# Patient Record
Sex: FEMALE | Race: WHITE | NOT HISPANIC OR LATINO | Employment: OTHER | URBAN - METROPOLITAN AREA
[De-identification: names, ages, dates, MRNs, and addresses within clinical notes are randomized per-mention and may not be internally consistent; named-entity substitution may affect disease eponyms.]

---

## 2017-03-04 ENCOUNTER — HOSPITAL ENCOUNTER (EMERGENCY)
Facility: HOSPITAL | Age: 72
Discharge: HOME | End: 2017-03-04
Attending: EMERGENCY MEDICINE | Admitting: EMERGENCY MEDICINE

## 2017-03-04 VITALS
OXYGEN SATURATION: 100 % | HEART RATE: 69 BPM | BODY MASS INDEX: 27.97 KG/M2 | RESPIRATION RATE: 18 BRPM | WEIGHT: 152 LBS | TEMPERATURE: 97.8 F | SYSTOLIC BLOOD PRESSURE: 152 MMHG | DIASTOLIC BLOOD PRESSURE: 105 MMHG | HEIGHT: 62 IN

## 2017-03-04 DIAGNOSIS — R04.0 EPISTAXIS: Primary | ICD-10-CM

## 2017-03-04 PROCEDURE — 30901 CONTROL OF NOSEBLEED: CPT

## 2017-03-04 PROCEDURE — 99283 EMERGENCY DEPT VISIT LOW MDM: CPT

## 2017-03-04 RX ADMIN — SILVER NITRATE APPLICATORS 1 APPLICATION: 25; 75 STICK TOPICAL at 17:42

## 2017-03-04 NOTE — ED PROVIDER NOTES
I supervised care provided by the midlevel provider.    We have discussed this patient's history, physical exam, and treatment plan.   I have reviewed the note and personally saw and examined the patient and agree with the plan of care.    Pt presents to the ED with left-sided epistaxis onset at about 15:00 today. Pt is not on any antiplatelets or anticoagulants. On physical exam, pt has an area on the left nare that has been cauterized. Pt has no active bleeding from the left nare at this time. Pt advised to apply pressure with clamp provided in the ER if epistaxis recurs. Pt will be provided f/u referral for ENT.        Documentation assistance provided by Malcom Flores. Information recorded by the scribe was done at my direction and has been verified and validated by me.     Entered by Malcom Flores, acting as scribe for Dr. Guzman MD.        Malcom Flores  03/04/17 1800       Ted Hinds MD  03/04/17 6947
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
## 2017-03-07 ENCOUNTER — TELEPHONE (OUTPATIENT)
Dept: SOCIAL WORK | Facility: HOSPITAL | Age: 72
End: 2017-03-07

## 2017-09-10 ENCOUNTER — HOSPITAL ENCOUNTER (EMERGENCY)
Facility: HOSPITAL | Age: 72
Discharge: HOME | End: 2017-09-10
Attending: EMERGENCY MEDICINE | Admitting: EMERGENCY MEDICINE

## 2017-09-10 VITALS
RESPIRATION RATE: 16 BRPM | HEIGHT: 62 IN | TEMPERATURE: 98 F | HEART RATE: 55 BPM | WEIGHT: 149 LBS | SYSTOLIC BLOOD PRESSURE: 137 MMHG | BODY MASS INDEX: 27.42 KG/M2 | DIASTOLIC BLOOD PRESSURE: 92 MMHG | OXYGEN SATURATION: 97 %

## 2017-09-10 DIAGNOSIS — S61.219A LACERATION OF FINGER, INITIAL ENCOUNTER: Primary | ICD-10-CM

## 2017-09-10 PROCEDURE — 90471 IMMUNIZATION ADMIN: CPT

## 2017-09-10 PROCEDURE — 25010000002 TETANUS-DIPHTHERIA TOXOIDS (ADULT) PER 0.5 ML

## 2017-09-10 PROCEDURE — 90714 TD VACC NO PRESV 7 YRS+ IM: CPT

## 2017-09-10 PROCEDURE — 99282 EMERGENCY DEPT VISIT SF MDM: CPT

## 2017-09-10 RX ORDER — CIPROFLOXACIN 250 MG/1
250 TABLET, FILM COATED ORAL 2 TIMES DAILY
Qty: 10 TABLET | Refills: 0 | Status: SHIPPED | OUTPATIENT
Start: 2017-09-10 | End: 2017-09-30

## 2017-09-10 RX ORDER — BUPIVACAINE HYDROCHLORIDE 5 MG/ML
10 INJECTION, SOLUTION EPIDURAL; INTRACAUDAL ONCE
Status: COMPLETED | OUTPATIENT
Start: 2017-09-10 | End: 2017-09-10

## 2017-09-10 RX ADMIN — BUPIVACAINE HYDROCHLORIDE 10 ML: 5 INJECTION, SOLUTION EPIDURAL; INTRACAUDAL at 09:38

## 2017-09-10 RX ADMIN — CLOSTRIDIUM TETANI TOXOID ANTIGEN (FORMALDEHYDE INACTIVATED) AND CORYNEBACTERIUM DIPHTHERIAE TOXOID ANTIGEN (FORMALDEHYDE INACTIVATED) 0.5 ML: 5; 2 INJECTION, SUSPENSION INTRAMUSCULAR at 11:33

## 2017-09-10 NOTE — ED NOTES
Applied tube antibiotic ointment, adaptic and tube gauze to left pointer finger.     Yumiko Alberto  09/10/17 2029

## 2017-09-10 NOTE — ED PROVIDER NOTES
EMERGENCY DEPARTMENT ENCOUNTER    CHIEF COMPLAINT  Chief Complaint: finger laceration  History given by: patient  History limited by: N/A  Room Number: 35/35  PMD: Raymundo Cardona MD      HPI:  Pt states that yesterday, at about 1800, she accidentally lacerated left 2nd finger on knife while she was slicing bread. She denies sensory loss, motor loss, fevers, and sustaining any other injury. After she applied pressure to the laceration site, the bleeding improved. She is not on any blood thinners. Tetanus status is unknown. Pt has no other complaints at this time.     Location: left 2nd finger  Radiation: N/A  Quality: none  Intensity/Severity: mild  Duration: occurred yesterday at about 1800  Onset quality: abrupt  Timing: constant  Progression: unchanged  Aggravating Factors: none  Alleviating Factors: applying pressure to control the bleeding  Previous Episodes: none mentioned  Treatment before arrival: After pt applied pressure to the laceration site, the bleeding improved.   Associated Symptoms: none       PAST MEDICAL HISTORY  Active Ambulatory Problems     Diagnosis Date Noted   • Acute bronchitis 03/18/2016   • Nonfamilial nocturnal leg cramps 04/18/2016   • Atypical migraine 09/26/2013   • Vitamin D deficiency 11/25/2014   • Cervicogenic headache 08/05/2014   • Essential hypertension 11/25/2014   • Gastroesophageal reflux disease 01/03/2014   • Cervico-occipital neuralgia 08/05/2014   • Congenital pes planus 01/15/2015     Resolved Ambulatory Problems     Diagnosis Date Noted   • Lumbar spinal stenosis 12/16/2016     Past Medical History:   Diagnosis Date   • Altered bowel elimination due to intestinal ostomy    • Anemia    • Asthma    • Extremity pain    • Heart murmur    • Herpes simplex    • History of transfusion    • Hypertension    • Low back pain    • Lumbosacral disc disease    • Migraine    • Migraine    • Spinal stenosis, lumbar    • Ulcerative colitis          PAST SURGICAL HISTORY  Past  Surgical History:   Procedure Laterality Date   • BUNIONECTOMY Right    • COLONOSCOPY     • CONTINENT CECOSTOMY  1980    LARGE INTESTINE REMOVED   • LUMBAR DISCECTOMY FUSION INSTRUMENTATION N/A 12/16/2016    Procedure: MICROLAMINECTOMY L3 4  L4 5 W/ METRIX;  Surgeon: Valdez Greene DO;  Location: [facility] MAIN OR;  Service:    • VASCULAR SURGERY Right     REPAIRED VEIN RIGHT LOWER EXTREMITY REPAIRED VALVE GROIN AREA   • WISDOM TOOTH EXTRACTION      FOUR         FAMILY HISTORY  Family History   Problem Relation Age of Onset   • Thyroid disease Mother    • Cancer Mother    • Hyperlipidemia Mother    • Alcohol abuse Father    • Cancer Sister    • Diabetes Sister    • Hypertension Brother    • Diabetes Brother    • Diabetes Brother    • Heart disease Paternal Grandmother    • Aortic aneurysm Other          SOCIAL HISTORY  Social History     Social History   • Marital status:      Spouse name: N/A   • Number of children: N/A   • Years of education: N/A     Occupational History   • Not on file.     Social History Main Topics   • Smoking status: Never Smoker   • Smokeless tobacco: Not on file   • Alcohol use 1.2 oz/week     2 Glasses of wine per week   • Drug use: No   • Sexual activity: Defer     Other Topics Concern   • Not on file     Social History Narrative   • No narrative on file         ALLERGIES  Codeine; Other; Latex; Nickel; and Penicillins        REVIEW OF SYSTEMS  Review of Systems   Constitutional: Negative for fever.   Respiratory: Negative for shortness of breath.    Cardiovascular: Negative for chest pain.   Skin: Positive for wound (left 2nd finger laceration).   Neurological: Negative for weakness and numbness.   All other systems reviewed and are negative.           PHYSICAL EXAM  ED Triage Vitals   Temp Heart Rate Resp BP SpO2   09/10/17 0904 09/10/17 0904 09/10/17 0904 09/10/17 0904 09/10/17 0904   98 °F (36.7 °C) 79 16 146/92 97 % WNL      Temp src Heart Rate Source Patient Position BP Location  FiO2 (%)   09/10/17 0904 09/10/17 0904 09/10/17 0904 09/10/17 0904 --   Tympanic Monitor Standing Left arm        Physical Exam   Constitutional: She is oriented to person, place, and time. No distress.   HENT:   Head: Normocephalic.   Neck: Normal range of motion. Neck supple.   Cardiovascular: Normal rate, regular rhythm and normal heart sounds.    Pulmonary/Chest: Effort normal and breath sounds normal. No respiratory distress.   Musculoskeletal:   flexion and extension intact to left 2nd finger, NV intact distally to LUE   Neurological: She is alert and oriented to person, place, and time. She has normal motor skills and normal sensation.   Sensation and motor function intact to left 2nd finger   Skin: Skin is warm and dry. Laceration (bevel laceration to the radial aspect of the tip of the left 2nd finger (no nail involvement)) noted.   Nursing note and vitals reviewed.          PROCEDURES  Laceration Repair  Date/Time: 9/10/2017 10:30 AM  Performed by: ESTHELA YOO  Authorized by: BREN MURPHY   Consent: Verbal consent obtained.  Risks and benefits: risks, benefits and alternatives were discussed  Consent given by: patient  Patient understanding: patient states understanding of the procedure being performed  Patient consent: the patient's understanding of the procedure matches consent given  Required items: required blood products, implants, devices, and special equipment available  Patient identity confirmed: verbally with patient and arm band  Body area: upper extremity  Location details: left index finger  Laceration length: 1 cm  Contaminated: none.  Foreign bodies: no foreign bodies  Tendon involvement: none  Nerve involvement: none  Vascular damage: no  Anesthesia: local infiltration    Anesthesia:  Local Anesthetic: bupivacaine 0.5% without epinephrine  Anesthetic total: 4 mL    Sedation:  Patient sedated: no  Preparation: Patient was prepped and draped in the usual sterile fashion.  Irrigation  solution: saline  Irrigation method: syringe  Amount of cleaning: extensive  Debridement: minimal  Degree of undermining: none  Skin closure: 6-0 nylon  Number of sutures: 5  Technique: simple (interrupted)  Approximation: close  Approximation difficulty: simple  Dressing: antibiotic ointment and tube gauze  Patient tolerance: Patient tolerated the procedure well with no immediate complications          PROGRESS AND CONSULTS  ED Course     9:38 AM- Although pt sustained laceration yesterday at about 1800, we will clean the wound extensively, perform delayed laceration repair, and prescribe abx to cover for potential infection.     10:30 AM- Performed laceration repair. See procedure note for further details.     10:46 AM- Discussed with pt about dx and plan for discharge. Informed pt of plan to update tetanus status and to prescribe abx to cover for infection. Instructed pt to keep laceration dry for 24 hours, then to clean site daily with soap and water, to apply antibiotic ointment, to monitor for signs of infection (including redness, drainage, swelling, and fevers), and to have sutures removed in 10 days. Instructed to f/u with PMD for recheck and for suture removal. RTER warnings given. Pt understands and agrees with plan. Addressed all questions.    10:48 AM- Tetanus status is unknown. Ordered tetanus diphtheria toxoids.    11:03 AM- Discussed case with Dr Vazquez who agrees with the plan of care.       MEDICAL DECISION MAKING    MDM  Number of Diagnoses or Management Options  Patient Progress  Patient progress: stable       DIAGNOSIS  Final diagnoses:   Laceration of finger, initial encounter         DISPOSITION  Discharged    DISCHARGE    Patient discharged in stable condition.    Reviewed implications of diagnosis, meds, responsibility to follow up, warning signs and symptoms of possible worsening, potential complications and reasons to return to ER.    Patient voiced understanding of above  instructions.    Discussed plan for discharge, as there is no emergent indication for admission.  Pt is agreeable and understands need for follow up and repeat testing.  Pt is aware that discharge does not mean that nothing is wrong but it indicates no emergency is present and they must continue care with follow-up as given below or physician of their choice.     FOLLOW-UP  Raymundo Cardona MD  [address] [facility] [address]  [phone]            DISCHARGE MEDICATIONS     Medication List      New Prescriptions          ciprofloxacin 250 MG tablet   Commonly known as:  CIPRO   Take 1 tablet by mouth 2 (Two) Times a Day.         Stop          sulfamethoxazole-trimethoprim 800-160 MG per tablet   Commonly known as:  BACTRIM DS,SEPTRA DS             Latest Documented Vital Signs:  As of 10:53 AM  BP- 146/92 HR- 79 Temp- 98 °F (36.7 °C) (Tympanic) O2 sat- 97%        --  Documentation assistance provided by edi Flores for BRANDON Johnson.  Information recorded by the scribe was done at my direction and has been verified and validated by me.     Leta Flores  09/10/17 1100       FAHEEM Cadet  09/10/17 1206

## 2017-09-10 NOTE — DISCHARGE INSTRUCTIONS
Leave tube gauze on 24 hours  Then clean with mild soap/water or 1/2 strength peroxide  Sutures out 10 days PMD

## 2017-09-10 NOTE — ED PROVIDER NOTES
Pt is a 72 y.o. female who presents c/o a laceration to her L index finger sustained yesterday while she was slicing bread. Pt denies sensory or motor changes and has no other complaints at this time.     PE:  Resting comfortably, no distress  Vitals stable  Sutured laceration to the distal L index finger    Plan to discharge.     I supervised care provided by the midlevel provider FAHEEM Chirinos.    We have discussed this patient's history, physical exam, and treatment plan.   I have reviewed the note and personally saw and examined the patient and agree with the plan of care.    Documentation assistance provided by edi Gray for Dr. Vazquez.  Information recorded by the scribe was done at my direction and has been verified and validated by me.       Unique Gray  09/10/17 8095       Tex Vazquez MD  09/10/17 7841

## 2017-09-13 ENCOUNTER — TELEPHONE (OUTPATIENT)
Dept: SOCIAL WORK | Facility: HOSPITAL | Age: 72
End: 2017-09-13

## 2017-09-13 ENCOUNTER — PATIENT OUTREACH (OUTPATIENT)
Dept: CASE MANAGEMENT | Facility: OTHER | Age: 72
End: 2017-09-13

## 2017-10-05 ENCOUNTER — OFFICE VISIT (OUTPATIENT)
Dept: INTERNAL MEDICINE | Facility: CLINIC | Age: 72
End: 2017-10-05

## 2017-10-05 VITALS
BODY MASS INDEX: 27.6 KG/M2 | DIASTOLIC BLOOD PRESSURE: 82 MMHG | OXYGEN SATURATION: 97 % | HEART RATE: 67 BPM | TEMPERATURE: 97.4 F | SYSTOLIC BLOOD PRESSURE: 128 MMHG | HEIGHT: 62 IN | WEIGHT: 150 LBS

## 2017-10-05 DIAGNOSIS — Z23 FLU VACCINE NEED: Primary | ICD-10-CM

## 2017-10-05 DIAGNOSIS — Q66.50 CONGENITAL PES PLANUS, UNSPECIFIED LATERALITY: ICD-10-CM

## 2017-10-05 DIAGNOSIS — K90.89 OTHER SPECIFIED INTESTINAL MALABSORPTION: ICD-10-CM

## 2017-10-05 DIAGNOSIS — E55.9 VITAMIN D DEFICIENCY: ICD-10-CM

## 2017-10-05 DIAGNOSIS — I10 ESSENTIAL HYPERTENSION: ICD-10-CM

## 2017-10-05 DIAGNOSIS — Z00.00 MEDICARE ANNUAL WELLNESS VISIT, INITIAL: ICD-10-CM

## 2017-10-05 DIAGNOSIS — Z23 NEED FOR PNEUMOCOCCAL VACCINE: ICD-10-CM

## 2017-10-05 DIAGNOSIS — K21.9 GASTROESOPHAGEAL REFLUX DISEASE WITHOUT ESOPHAGITIS: ICD-10-CM

## 2017-10-05 PROBLEM — K90.9 INTESTINAL MALABSORPTION: Status: ACTIVE | Noted: 2017-10-05

## 2017-10-05 PROCEDURE — 90662 IIV NO PRSV INCREASED AG IM: CPT | Performed by: INTERNAL MEDICINE

## 2017-10-05 PROCEDURE — 99214 OFFICE O/P EST MOD 30 MIN: CPT | Performed by: INTERNAL MEDICINE

## 2017-10-05 PROCEDURE — G0438 PPPS, INITIAL VISIT: HCPCS | Performed by: INTERNAL MEDICINE

## 2017-10-05 PROCEDURE — G0008 ADMIN INFLUENZA VIRUS VAC: HCPCS | Performed by: INTERNAL MEDICINE

## 2017-10-05 NOTE — PATIENT INSTRUCTIONS
Medicare Wellness  Personal Prevention Plan of Service     Date of Office Visit:  10/05/2017  Encounter Provider:  Christiano Narvaez MD  Place of Service:  [facility] INTERNAL MEDICINE  Patient Name: Sigrid Chen  :  1945    As part of the Medicare Wellness portion of your visit today, we are providing you with this personalized preventive plan of services (PPPS). This plan is based upon recommendations of the [place] Preventive Services Task Force (USPSTF) and the Advisory Committee on Immunization Practices (ACIP).    This lists the preventive care services that should be considered, and provides dates of when you are due. Items listed as completed are up-to-date and do not require any further intervention.    Health Maintenance   Topic Date Due   • PNEUMOCOCCAL VACCINES (65+ LOW/MEDIUM RISK) (1 of 2 - PCV13) 2010   • ZOSTER VACCINE  03/15/2016   • INFLUENZA VACCINE  2017   • TDAP/TD VACCINES (1 - Tdap) 2017   • LIPID PANEL  10/20/2017   • MEDICARE ANNUAL WELLNESS  10/05/2018   • MAMMOGRAM  2019   • HEPATITIS C SCREENING  Addressed   • COLONOSCOPY  Excluded       Orders Placed This Encounter   Procedures   • Flu Vaccine High Dose PF 65YR+       Return in about 1 year (around 10/5/2018) for Annual physical, Medicare Wellness.

## 2017-10-05 NOTE — PROGRESS NOTES
QUICK REFERENCE INFORMATION:  The ABCs of the Annual Wellness Visit    Initial Medicare Wellness Visit    HEALTH RISK ASSESSMENT    1945    Recent Hospitalizations:  No hospitalization(s) within the last year..        Current Medical Providers:  Patient Care Team:  Christiano Narvaez MD as PCP - General (Internal Medicine)  Flor Dale MD as PCP - Claims Attributed  Emilie Kramer RN as Care Coordinator (Population Health)        Smoking Status:  History   Smoking Status   • Never Smoker   Smokeless Tobacco   • Never Used       Alcohol Consumption:  History   Alcohol Use   • 1.2 oz/week   • 2 Glasses of wine per week       Depression Screen:   PHQ-2/PHQ-9 Depression Screening 10/5/2017   Little interest or pleasure in doing things 0   Feeling down, depressed, or hopeless 0   Total Score 0       Health Habits and Functional and Cognitive Screening:  Functional & Cognitive Status 10/5/2017   Do you have difficulty preparing food and eating? No   Do you have difficulty bathing yourself? No   Do you have difficulty getting dressed? No   Do you have difficulty using the toilet? No   Do you have difficulty moving around from place to place? No   In the past year have you fallen or experienced a near fall? No   Do you need help using the phone?  No   Are you deaf or do you have serious difficulty hearing?  No   Do you need help with transportation? No   Do you need help shopping? No   Do you need help preparing meals?  No   Do you need help with housework?  No   Do you need help with laundry? No   Do you need help taking your medications? No   Do you need help managing money? No   Do you have difficulty concentrating, remembering or making decisions? No       Health Habits  Current Diet: Well Balanced Diet  Dental Exam: Up to date  Eye Exam: Up to date  Exercise (times per week): 5 times per week  Current Exercise Activities Include: Cardiovasular Workout on Exercise Equipment          Does the patient have  evidence of cognitive impairment? No    Asiprin use counseling: Contraindicated from taking ASA      Recent Lab Results:    Visual Acuity:  No exam data present    Age-appropriate Screening Schedule:  Refer to the list below for future screening recommendations based on patient's age, sex and/or medical conditions. Orders for these recommended tests are listed in the plan section. The patient has been provided with a written plan.    Health Maintenance   Topic Date Due   • PNEUMOCOCCAL VACCINES (65+ LOW/MEDIUM RISK) (1 of 2 - PCV13) 03/11/2010   • ZOSTER VACCINE  03/15/2016   • INFLUENZA VACCINE  08/01/2017   • TDAP/TD VACCINES (1 - Tdap) 09/11/2017   • LIPID PANEL  10/20/2017   • MAMMOGRAM  05/25/2019   • COLONOSCOPY  Excluded        Subjective   History of Present Illness    Sigrid Chen is a 72 y.o. female who presents for an Annual Wellness Visit.    The following portions of the patient's history were reviewed and updated as appropriate: allergies, current medications, past family history, past medical history, past social history, past surgical history and problem list.    Outpatient Medications Prior to Visit   Medication Sig Dispense Refill   • diphenhydrAMINE (BENADRYL) 25 mg capsule Take 25 mg by mouth At Night As Needed.     • Ginkgo Biloba (GNP GINGKO BILOBA EXTRACT PO) Take 1 tablet by mouth Daily.     • ibuprofen (ADVIL,MOTRIN) 400 MG tablet Take 400 mg by mouth Every 6 (Six) Hours As Needed for mild pain (1-3). HOLD PRIOR TO SURGERY     • metoprolol tartrate (LOPRESSOR) 25 MG tablet TAKE 1 TABLET BY MOUTH 2 (TWO) TIMES A DAY. 180 tablet 1   • Multiple Vitamin (MULTI-VITAMIN PO) Take  by mouth. HOLD PRIOR TO SURGERY     • Potassium Gluconate 550 MG tablet Take 550 mg by mouth 2 (Two) Times a Day As Needed.     • SUMAtriptan (IMITREX) 100 MG tablet Take 100 mg by mouth 1 (One) Time As Needed.     • tiZANidine (ZANAFLEX) 2 MG tablet Take 2 mg by mouth at night as needed for muscle spasms.     •  "valACYclovir (VALTREX) 1000 MG tablet Take 1,000 mg by mouth 2 (Two) Times a Day.     • sulfamethoxazole-trimethoprim (BACTRIM DS,SEPTRA DS) 800-160 MG per tablet Take 1 tablet by mouth 2 (Two) Times a Day. 10 tablet 0   • venlafaxine (EFFEXOR) 37.5 MG tablet Take 18.75 mg by mouth 2 (Two) Times a Day. prn        No facility-administered medications prior to visit.        Patient Active Problem List   Diagnosis   • Acute bronchitis   • Nonfamilial nocturnal leg cramps   • Atypical migraine   • Vitamin D deficiency   • Cervicogenic headache   • Essential hypertension   • Gastroesophageal reflux disease   • Cervico-occipital neuralgia   • Congenital pes planus   • Medicare annual wellness visit, initial       Advance Care Planning:  has an advance directive - a copy has been provided and is in file    Identification of Risk Factors:  Risk factors include: no new recs.    Review of Systems    Compared to one year ago, the patient feels her physical health is better.  Compared to one year ago, the patient feels her mental health is better.    Objective     Physical Exam    Vitals:    10/05/17 1322   BP: 128/82   BP Location: Left arm   Patient Position: Sitting   Cuff Size: Adult   Pulse: 67   Temp: 97.4 °F (36.3 °C)   TempSrc: Oral   SpO2: 97%   Weight: 150 lb (68 kg)   Height: 62\" (157.5 cm)   PainSc: 0-No pain       Body mass index is 27.44 kg/(m^2).  Discussed the patient's BMI with her. The BMI is in the acceptable range.    Assessment/Plan   Patient Self-Management and Personalized Health Advice  The patient has been provided with information about: no new recs and preventive services including:   · no new recs.    Visit Diagnoses:    ICD-10-CM ICD-9-CM   1. Flu vaccine need Z23 V04.81   2. Medicare annual wellness visit, initial Z00.00 V70.0   3. Essential hypertension I10 401.9   4. Gastroesophageal reflux disease without esophagitis K21.9 530.81   5. Congenital pes planus, unspecified laterality Q66.50 754.61 "   6. Vitamin D deficiency E55.9 268.9       Orders Placed This Encounter   Procedures   • Flu Vaccine High Dose PF 65YR+       Outpatient Encounter Prescriptions as of 10/5/2017   Medication Sig Dispense Refill   • diphenhydrAMINE (BENADRYL) 25 mg capsule Take 25 mg by mouth At Night As Needed.     • Ginkgo Biloba (GNP GINGKO BILOBA EXTRACT PO) Take 1 tablet by mouth Daily.     • ibuprofen (ADVIL,MOTRIN) 400 MG tablet Take 400 mg by mouth Every 6 (Six) Hours As Needed for mild pain (1-3). HOLD PRIOR TO SURGERY     • metoprolol tartrate (LOPRESSOR) 25 MG tablet TAKE 1 TABLET BY MOUTH 2 (TWO) TIMES A DAY. 180 tablet 1   • Multiple Vitamin (MULTI-VITAMIN PO) Take  by mouth. HOLD PRIOR TO SURGERY     • Potassium Gluconate 550 MG tablet Take 550 mg by mouth 2 (Two) Times a Day As Needed.     • SUMAtriptan (IMITREX) 100 MG tablet Take 100 mg by mouth 1 (One) Time As Needed.     • tiZANidine (ZANAFLEX) 2 MG tablet Take 2 mg by mouth at night as needed for muscle spasms.     • valACYclovir (VALTREX) 1000 MG tablet Take 1,000 mg by mouth 2 (Two) Times a Day.     • [DISCONTINUED] sulfamethoxazole-trimethoprim (BACTRIM DS,SEPTRA DS) 800-160 MG per tablet Take 1 tablet by mouth 2 (Two) Times a Day. 10 tablet 0   • [DISCONTINUED] venlafaxine (EFFEXOR) 37.5 MG tablet Take 18.75 mg by mouth 2 (Two) Times a Day. prn        No facility-administered encounter medications on file as of 10/5/2017.        Reviewed use of high risk medication in the elderly: yes  Reviewed for potential of harmful drug interactions in the elderly: yes    Follow Up:  No Follow-up on file.     An After Visit Summary and PPPS with all of these plans were given to the patient.

## 2017-10-06 LAB
25(OH)D3+25(OH)D2 SERPL-MCNC: 28.6 NG/ML (ref 30–100)
APPEARANCE UR: CLEAR
BACTERIA #/AREA URNS HPF: NORMAL /HPF
BILIRUB UR QL STRIP: NEGATIVE
CASTS URNS MICRO: NORMAL
COLOR UR: YELLOW
EPI CELLS #/AREA URNS HPF: NORMAL /HPF
GLUCOSE UR QL: NEGATIVE
HGB UR QL STRIP: NEGATIVE
KETONES UR QL STRIP: NEGATIVE
LEUKOCYTE ESTERASE UR QL STRIP: NEGATIVE
NITRITE UR QL STRIP: NEGATIVE
PH UR STRIP: 6 [PH] (ref 5–8)
PROT UR QL STRIP: NEGATIVE
RBC #/AREA URNS HPF: NORMAL /HPF
SP GR UR: 1.02 (ref 1–1.03)
T4 FREE SERPL-MCNC: 1.01 NG/DL (ref 0.93–1.7)
TSH SERPL DL<=0.005 MIU/L-ACNC: 2.16 MIU/ML (ref 0.27–4.2)
UROBILINOGEN UR STRIP-MCNC: (no result) MG/DL
VIT B12 SERPL-MCNC: 295 PG/ML (ref 211–946)
WBC #/AREA URNS HPF: NORMAL /HPF

## 2017-10-27 NOTE — PROGRESS NOTES
Subjective   Sigrid Chen is a 72 y.o. female.   She is here today for Medicare annual wellness visit initial along with hypertension GERD congenital pes planus vitamin D deficiency and intestinal malabsorption  History of Present Illness   She is here today for Medicare annual wellness visit initial along with hypertension GERD congenital pes planus vitamin D deficiency and intestinal malabsorption and she has no new complaints and she is on medication for and vitamin D deficiency as well as hypertension GERD and joint aches  The following portions of the patient's history were reviewed and updated as appropriate: allergies, current medications, past family history, past medical history, past social history, past surgical history and problem list.    Review of Systems   All other systems reviewed and are negative.      Objective   Physical Exam   Constitutional: She is oriented to person, place, and time. Vital signs are normal. She appears well-developed and well-nourished. She is active and cooperative.   HENT:   Head: Normocephalic and atraumatic.   Right Ear: Hearing, tympanic membrane, external ear and ear canal normal.   Left Ear: Hearing, tympanic membrane, external ear and ear canal normal.   Nose: Nose normal.   Mouth/Throat: Uvula is midline, oropharynx is clear and moist and mucous membranes are normal.   Eyes: Conjunctivae, EOM and lids are normal. Pupils are equal, round, and reactive to light. Right eye exhibits no discharge. Left eye exhibits no discharge.   Neck: Trachea normal, normal range of motion, full passive range of motion without pain and phonation normal. Neck supple. Carotid bruit is not present. No edema present. No thyroid mass and no thyromegaly present.   Cardiovascular: Normal rate, regular rhythm, normal heart sounds, intact distal pulses and normal pulses.  Exam reveals no gallop and no friction rub.    No murmur heard.  Pulmonary/Chest: Effort normal and breath sounds normal.  No respiratory distress. She has no wheezes. She has no rales.   Abdominal: Soft. Normal appearance, normal aorta and bowel sounds are normal. She exhibits no distension, no abdominal bruit and no mass. There is no hepatosplenomegaly. There is no tenderness. There is no rebound, no guarding and no CVA tenderness. No hernia. Hernia confirmed negative in the right inguinal area and confirmed negative in the left inguinal area.   Musculoskeletal: Normal range of motion. She exhibits no edema or tenderness.       Vascular Status -  Her exam exhibits right foot vasculature normal. Her exam exhibits no right foot edema. Her exam exhibits left foot vasculature normal. Her exam exhibits no left foot edema.   Skin Integrity  -  Her right foot skin is intact.     Sigrid 's left foot skin is intact. .     Lymphadenopathy:     She has no cervical adenopathy.     She has no axillary adenopathy.        Right: No inguinal and no supraclavicular adenopathy present.        Left: No inguinal and no supraclavicular adenopathy present.   Neurological: She is alert and oriented to person, place, and time. She has normal strength. No cranial nerve deficit or sensory deficit. She exhibits normal muscle tone. She displays a negative Romberg sign. Coordination and gait normal.   Skin: Skin is warm, dry and intact. No cyanosis. Nails show no clubbing.   Psychiatric: She has a normal mood and affect. Her speech is normal and behavior is normal. Judgment and thought content normal. Cognition and memory are normal.   Nursing note and vitals reviewed.      Assessment/Plan   Diagnoses and all orders for this visit:    Flu vaccine need  -     Flu Vaccine High Dose PF 65YR+  -     T4, Free  -     TSH  -     Urinalysis With Microscopic - Urine, Clean Catch  -     Vitamin B12  -     Vitamin D 25 Hydroxy    Medicare annual wellness visit, initial  -     T4, Free  -     TSH  -     Urinalysis With Microscopic - Urine, Clean Catch  -     Vitamin B12  -      Vitamin D 25 Hydroxy    Essential hypertension  -     T4, Free  -     TSH  -     Urinalysis With Microscopic - Urine, Clean Catch  -     Vitamin B12  -     Vitamin D 25 Hydroxy    Gastroesophageal reflux disease without esophagitis  -     T4, Free  -     TSH  -     Urinalysis With Microscopic - Urine, Clean Catch  -     Vitamin B12  -     Vitamin D 25 Hydroxy    Congenital pes planus, unspecified laterality  -     T4, Free  -     TSH  -     Urinalysis With Microscopic - Urine, Clean Catch  -     Vitamin B12  -     Vitamin D 25 Hydroxy    Vitamin D deficiency  -     T4, Free  -     TSH  -     Urinalysis With Microscopic - Urine, Clean Catch  -     Vitamin B12  -     Vitamin D 25 Hydroxy    Need for pneumococcal vaccine  -     T4, Free  -     TSH  -     Urinalysis With Microscopic - Urine, Clean Catch  -     Vitamin B12  -     Vitamin D 25 Hydroxy    Other specified intestinal malabsorption  -     T4, Free  -     TSH  -     Urinalysis With Microscopic - Urine, Clean Catch  -     Vitamin B12  -     Vitamin D 25 Hydroxy    Other orders  -     Microscopic Examination      Medicare annual wellness visit initial following recommendation  Vitamin D deficiency continue current vitamin D and check levels  Intestinal malabsorption check vitamin B12 and vitamin D levels and provide probiotics which she will be taken  GERD without esophagitis supportive meds including proton pump inhibitors  Flu vaccine and pneumococcal vaccine needed given today  Congenital pes planus noted and she will try arch supports for now

## 2017-12-06 ENCOUNTER — HOSPITAL ENCOUNTER (OUTPATIENT)
Dept: CARDIOLOGY | Facility: HOSPITAL | Age: 72
Discharge: HOME | End: 2017-12-06
Admitting: INTERNAL MEDICINE

## 2017-12-06 DIAGNOSIS — M79.89 PAIN AND SWELLING OF RIGHT LOWER LEG: Primary | ICD-10-CM

## 2017-12-06 DIAGNOSIS — M79.661 PAIN AND SWELLING OF RIGHT LOWER LEG: Primary | ICD-10-CM

## 2017-12-06 LAB
BH CV LOW VAS RIGHT GREATER SAPH AK VESSEL: 1
BH CV LOW VAS RIGHT GREATER SAPH BK VESSEL: 1
BH CV LOW VAS RIGHT SAPHENOFEMORAL JUNCTION SPONT: 1
BH CV LOW VAS RIGHT VARICOSITY BK VESSEL: 1
BH CV LOWER VASCULAR LEFT COMMON FEMORAL AUGMENT: NORMAL
BH CV LOWER VASCULAR LEFT COMMON FEMORAL COMPETENT: NORMAL
BH CV LOWER VASCULAR LEFT COMMON FEMORAL COMPRESS: NORMAL
BH CV LOWER VASCULAR LEFT COMMON FEMORAL PHASIC: NORMAL
BH CV LOWER VASCULAR LEFT COMMON FEMORAL SPONT: NORMAL
BH CV LOWER VASCULAR RIGHT COMMON FEMORAL AUGMENT: NORMAL
BH CV LOWER VASCULAR RIGHT COMMON FEMORAL COMPETENT: NORMAL
BH CV LOWER VASCULAR RIGHT COMMON FEMORAL COMPRESS: NORMAL
BH CV LOWER VASCULAR RIGHT COMMON FEMORAL PHASIC: NORMAL
BH CV LOWER VASCULAR RIGHT COMMON FEMORAL SPONT: NORMAL
BH CV LOWER VASCULAR RIGHT DISTAL FEMORAL COMPRESS: NORMAL
BH CV LOWER VASCULAR RIGHT GASTRONEMIUS COMPRESS: NORMAL
BH CV LOWER VASCULAR RIGHT GREATER SAPH AK COMPRESS: NORMAL
BH CV LOWER VASCULAR RIGHT GREATER SAPH AK THROMBUS: NORMAL
BH CV LOWER VASCULAR RIGHT GREATER SAPH BK COMPRESS: NORMAL
BH CV LOWER VASCULAR RIGHT GREATER SAPH BK THROMBUS: NORMAL
BH CV LOWER VASCULAR RIGHT LESSER SAPH COMPRESS: NORMAL
BH CV LOWER VASCULAR RIGHT MID FEMORAL AUGMENT: NORMAL
BH CV LOWER VASCULAR RIGHT MID FEMORAL COMPETENT: NORMAL
BH CV LOWER VASCULAR RIGHT MID FEMORAL COMPRESS: NORMAL
BH CV LOWER VASCULAR RIGHT MID FEMORAL PHASIC: NORMAL
BH CV LOWER VASCULAR RIGHT MID FEMORAL SPONT: NORMAL
BH CV LOWER VASCULAR RIGHT PERONEAL COMPRESS: NORMAL
BH CV LOWER VASCULAR RIGHT POPLITEAL AUGMENT: NORMAL
BH CV LOWER VASCULAR RIGHT POPLITEAL COMPETENT: NORMAL
BH CV LOWER VASCULAR RIGHT POPLITEAL COMPRESS: NORMAL
BH CV LOWER VASCULAR RIGHT POPLITEAL PHASIC: NORMAL
BH CV LOWER VASCULAR RIGHT POPLITEAL SPONT: NORMAL
BH CV LOWER VASCULAR RIGHT POSTERIOR TIBIAL COMPRESS: NORMAL
BH CV LOWER VASCULAR RIGHT PROXIMAL FEMORAL COMPRESS: NORMAL
BH CV LOWER VASCULAR RIGHT SAPHENOFEMORAL JUNCTION AUGMENT: NORMAL
BH CV LOWER VASCULAR RIGHT SAPHENOFEMORAL JUNCTION COMPETENT: NORMAL
BH CV LOWER VASCULAR RIGHT SAPHENOFEMORAL JUNCTION COMPRESS: NORMAL
BH CV LOWER VASCULAR RIGHT SAPHENOFEMORAL JUNCTION PHASIC: NORMAL
BH CV LOWER VASCULAR RIGHT SAPHENOFEMORAL JUNCTION SPONT: NORMAL
BH CV LOWER VASCULAR RIGHT VARICOSITY BK COMPRESS: NORMAL
BH CV LOWER VASCULAR RIGHT VARICOSITY BK THROMBUS: NORMAL

## 2017-12-06 PROCEDURE — 93971 EXTREMITY STUDY: CPT

## 2017-12-06 NOTE — PROGRESS NOTES
RLE Venous doppler study complete. Preliminary positive for acute superficial thrombophlebitis called to Dr. Narvaez  Instructions for patient to take 325 ASA once a day. Patient voiced understanding

## 2018-01-05 ENCOUNTER — EPISODE CHANGES (OUTPATIENT)
Dept: CASE MANAGEMENT | Facility: OTHER | Age: 73
End: 2018-01-05

## 2018-01-21 RX ORDER — NITROFURANTOIN 25; 75 MG/1; MG/1
100 CAPSULE ORAL EVERY 12 HOURS SCHEDULED
Qty: 20 CAPSULE | Refills: 0 | Status: SHIPPED | OUTPATIENT
Start: 2018-01-21 | End: 2018-02-05

## 2018-02-05 ENCOUNTER — APPOINTMENT (OUTPATIENT)
Dept: PREADMISSION TESTING | Facility: HOSPITAL | Age: 73
End: 2018-02-05

## 2018-02-05 VITALS
TEMPERATURE: 97.1 F | HEIGHT: 62 IN | WEIGHT: 148.3 LBS | SYSTOLIC BLOOD PRESSURE: 139 MMHG | DIASTOLIC BLOOD PRESSURE: 84 MMHG | RESPIRATION RATE: 16 BRPM | OXYGEN SATURATION: 98 % | BODY MASS INDEX: 27.29 KG/M2 | HEART RATE: 62 BPM

## 2018-02-05 LAB
ALBUMIN SERPL-MCNC: 4.1 G/DL (ref 3.5–5.2)
ALBUMIN/GLOB SERPL: 1.5 G/DL
ALP SERPL-CCNC: 69 U/L (ref 39–117)
ALT SERPL W P-5'-P-CCNC: 17 U/L (ref 1–33)
ANION GAP SERPL CALCULATED.3IONS-SCNC: 11.1 MMOL/L
AST SERPL-CCNC: 19 U/L (ref 1–32)
BASOPHILS # BLD AUTO: 0.08 10*3/MM3 (ref 0–0.2)
BASOPHILS NFR BLD AUTO: 1 % (ref 0–1.5)
BILIRUB SERPL-MCNC: 0.3 MG/DL (ref 0.1–1.2)
BUN BLD-MCNC: 10 MG/DL (ref 8–23)
BUN/CREAT SERPL: 12 (ref 7–25)
CALCIUM SPEC-SCNC: 9.5 MG/DL (ref 8.6–10.5)
CHLORIDE SERPL-SCNC: 97 MMOL/L (ref 98–107)
CO2 SERPL-SCNC: 24.9 MMOL/L (ref 22–29)
CREAT BLD-MCNC: 0.83 MG/DL (ref 0.57–1)
DEPRECATED RDW RBC AUTO: 45.4 FL (ref 37–54)
EOSINOPHIL # BLD AUTO: 0.21 10*3/MM3 (ref 0–0.7)
EOSINOPHIL NFR BLD AUTO: 2.5 % (ref 0.3–6.2)
ERYTHROCYTE [DISTWIDTH] IN BLOOD BY AUTOMATED COUNT: 13.2 % (ref 11.7–13)
GFR SERPL CREATININE-BSD FRML MDRD: 68 ML/MIN/1.73
GLOBULIN UR ELPH-MCNC: 2.8 GM/DL
GLUCOSE BLD-MCNC: 100 MG/DL (ref 65–99)
HCT VFR BLD AUTO: 39.4 % (ref 35.6–45.5)
HGB BLD-MCNC: 12.9 G/DL (ref 11.9–15.5)
IMM GRANULOCYTES # BLD: 0 10*3/MM3 (ref 0–0.03)
IMM GRANULOCYTES NFR BLD: 0 % (ref 0–0.5)
LYMPHOCYTES # BLD AUTO: 2.07 10*3/MM3 (ref 0.9–4.8)
LYMPHOCYTES NFR BLD AUTO: 24.8 % (ref 19.6–45.3)
MCH RBC QN AUTO: 31.1 PG (ref 26.9–32)
MCHC RBC AUTO-ENTMCNC: 32.7 G/DL (ref 32.4–36.3)
MCV RBC AUTO: 94.9 FL (ref 80.5–98.2)
MONOCYTES # BLD AUTO: 0.83 10*3/MM3 (ref 0.2–1.2)
MONOCYTES NFR BLD AUTO: 9.9 % (ref 5–12)
NEUTROPHILS # BLD AUTO: 5.17 10*3/MM3 (ref 1.9–8.1)
NEUTROPHILS NFR BLD AUTO: 61.8 % (ref 42.7–76)
PLATELET # BLD AUTO: 293 10*3/MM3 (ref 140–500)
PMV BLD AUTO: 11.3 FL (ref 6–12)
POTASSIUM BLD-SCNC: 4.1 MMOL/L (ref 3.5–5.2)
PROT SERPL-MCNC: 6.9 G/DL (ref 6–8.5)
RBC # BLD AUTO: 4.15 10*6/MM3 (ref 3.9–5.2)
SODIUM BLD-SCNC: 133 MMOL/L (ref 136–145)
WBC NRBC COR # BLD: 8.36 10*3/MM3 (ref 4.5–10.7)

## 2018-02-05 PROCEDURE — 93005 ELECTROCARDIOGRAM TRACING: CPT

## 2018-02-05 PROCEDURE — 80053 COMPREHEN METABOLIC PANEL: CPT | Performed by: ORTHOPAEDIC SURGERY

## 2018-02-05 PROCEDURE — 93010 ELECTROCARDIOGRAM REPORT: CPT | Performed by: INTERNAL MEDICINE

## 2018-02-05 PROCEDURE — 36415 COLL VENOUS BLD VENIPUNCTURE: CPT

## 2018-02-05 PROCEDURE — 85027 COMPLETE CBC AUTOMATED: CPT | Performed by: ORTHOPAEDIC SURGERY

## 2018-02-05 RX ORDER — LORATADINE 10 MG/1
10 TABLET ORAL DAILY
COMMUNITY

## 2018-02-05 RX ORDER — TIZANIDINE HYDROCHLORIDE 2 MG/1
2 CAPSULE, GELATIN COATED ORAL AS NEEDED
COMMUNITY
End: 2022-04-06

## 2018-02-05 NOTE — DISCHARGE INSTRUCTIONS
Take the following medications the morning of surgery with a small sip of water:  METOPROLOL    PLEASE ARRIVE AT HOSPITAL AT 08:30 AM ON February 19, 2018    General Instructions:  • Do not eat solid food after midnight the night before surgery.  • You may drink clear liquids day of surgery but must stop at least one hour before your hospital arrival time.  • It is beneficial for you to have a clear drink that contains carbohydrates the day of surgery.  We suggest a 12 to 20 ounce bottle of Gatorade or Powerade for non-diabetic patients or a 12 to 20 ounce bottle of G2 or Powerade Zero for diabetic patients. (Pediatric patients, are not advised to drink a 12 to 20 ounce carbohydrate drink)    Clear liquids are liquids you can see through.  Nothing red in color.     Plain water                               Sports drinks  Sodas                                   Gelatin (Jell-O)  Fruit juices without pulp such as white grape juice and apple juice  Popsicles that contain no fruit or yogurt  Tea or coffee (no cream or milk added)  Gatorade / Powerade  G2 / Powerade Zero    • Infants may have breast milk up to four hours before surgery.  • Infants drinking formula may drink formula up to six hours before surgery.   • Patients who avoid smoking, chewing tobacco and alcohol for 4 weeks prior to surgery have a reduced risk of post-operative complications.  Quit smoking as many days before surgery as you can.  • Do not smoke, use chewing tobacco or drink alcohol the day of surgery.   • If applicable bring your C-PAP/ BI-PAP machine.  • Bring any papers given to you in the doctor’s office.  • Wear clean comfortable clothes and socks.  • Do not wear contact lenses or make-up.  Bring a case for your glasses.   • Bring crutches or walker if applicable.  • Remove all piercings.  Leave jewelry and any other valuables at home.  • Hair extensions with metal clips must be removed prior to surgery.  • The Pre-Admission Testing nurse  will instruct you to bring medications if unable to obtain an accurate list in Pre-Admission Testing.      Preventing a Surgical Site Infection:  • For 2 to 3 days before surgery, avoid shaving with a razor because the razor can irritate skin and make it easier to develop an infection.  • The night prior to surgery sleep in a clean bed with clean clothing.  Do not allow pets to sleep with you.  • Shower on the morning of surgery using a fresh bar of anti-bacterial soap (such as Dial) and clean washcloth.  Dry with a clean towel and dress in clean clothing.  • Ask your surgeon if you will be receiving antibiotics prior to surgery.  • Make sure you, your family, and all healthcare providers clean their hands with soap and water or an alcohol based hand  before caring for you or your wound.    Day of surgery:  Upon arrival, a Pre-op nurse and Anesthesiologist will review your health history, obtain vital signs, and answer questions you may have.  The only belongings needed at this time will be your home medications and if applicable your C-PAP/BI-PAP machine.  If you are staying overnight your family can leave the rest of your belongings in the car and bring them to your room later.  A Pre-op nurse will start an IV and you may receive medication in preparation for surgery, including something to help you relax.  Your family will be able to see you in the Pre-op area.  While you are in surgery your family should notify the waiting room  if they leave the waiting room area and provide a contact phone number.    Please be aware that surgery does come with discomfort.  We want to make every effort to control your discomfort so please discuss any uncontrolled symptoms with your nurse.   Your doctor will most likely have prescribed pain medications.      If you are going home after surgery you will receive individualized written care instructions before being discharged.  A responsible adult must drive  you to and from the hospital on the day of your surgery and stay with you for 24 hours.    If you are staying overnight following surgery, you will be transported to your hospital room following the recovery period.  [facility] has all private rooms.    If you have any questions please call Pre-Admission Testing at [phone].  Deductibles and co-payments are collected on the day of service. Please be prepared to pay the required co-pay, deductible or deposit on the day of service as defined by your plan.

## 2018-02-19 ENCOUNTER — ANESTHESIA EVENT (OUTPATIENT)
Dept: PERIOP | Facility: HOSPITAL | Age: 73
End: 2018-02-19

## 2018-02-19 ENCOUNTER — ANESTHESIA (OUTPATIENT)
Dept: PERIOP | Facility: HOSPITAL | Age: 73
End: 2018-02-19

## 2018-02-19 ENCOUNTER — HOSPITAL ENCOUNTER (OUTPATIENT)
Facility: HOSPITAL | Age: 73
Setting detail: HOSPITAL OUTPATIENT SURGERY
Discharge: HOME | End: 2018-02-19
Attending: ORTHOPAEDIC SURGERY | Admitting: ORTHOPAEDIC SURGERY

## 2018-02-19 VITALS
DIASTOLIC BLOOD PRESSURE: 86 MMHG | TEMPERATURE: 97.9 F | RESPIRATION RATE: 18 BRPM | SYSTOLIC BLOOD PRESSURE: 135 MMHG | OXYGEN SATURATION: 100 % | HEART RATE: 63 BPM

## 2018-02-19 PROCEDURE — C1713 ANCHOR/SCREW BN/BN,TIS/BN: HCPCS | Performed by: ORTHOPAEDIC SURGERY

## 2018-02-19 PROCEDURE — 25010000002 MIDAZOLAM PER 1 MG: Performed by: ANESTHESIOLOGY

## 2018-02-19 PROCEDURE — 25010000002 FENTANYL CITRATE (PF) 100 MCG/2ML SOLUTION: Performed by: NURSE ANESTHETIST, CERTIFIED REGISTERED

## 2018-02-19 PROCEDURE — 25010000002 PROPOFOL 10 MG/ML EMULSION: Performed by: NURSE ANESTHETIST, CERTIFIED REGISTERED

## 2018-02-19 DEVICE — SCRW COMPR NOHD FUL/THRD TI MICRO 2.5X18MM: Type: IMPLANTABLE DEVICE | Site: FOOT | Status: FUNCTIONAL

## 2018-02-19 DEVICE — SCRW LPS SS 2.3X12MM: Type: IMPLANTABLE DEVICE | Site: FOOT | Status: FUNCTIONAL

## 2018-02-19 DEVICE — PLT PLAPLE 12MM: Type: IMPLANTABLE DEVICE | Site: FOOT | Status: FUNCTIONAL

## 2018-02-19 RX ORDER — NALBUPHINE HCL 10 MG/ML
10 AMPUL (ML) INJECTION EVERY 4 HOURS PRN
Status: DISCONTINUED | OUTPATIENT
Start: 2018-02-19 | End: 2018-02-19 | Stop reason: HOSPADM

## 2018-02-19 RX ORDER — BUPIVACAINE HYDROCHLORIDE 5 MG/ML
INJECTION, SOLUTION EPIDURAL; INTRACAUDAL AS NEEDED
Status: DISCONTINUED | OUTPATIENT
Start: 2018-02-19 | End: 2018-02-19 | Stop reason: HOSPADM

## 2018-02-19 RX ORDER — ASPIRIN 325 MG
325 TABLET, DELAYED RELEASE (ENTERIC COATED) ORAL DAILY
Qty: 30 TABLET | Refills: 0 | Status: SHIPPED | OUTPATIENT
Start: 2018-02-19 | End: 2022-04-06

## 2018-02-19 RX ORDER — SODIUM CHLORIDE, SODIUM LACTATE, POTASSIUM CHLORIDE, CALCIUM CHLORIDE 600; 310; 30; 20 MG/100ML; MG/100ML; MG/100ML; MG/100ML
9 INJECTION, SOLUTION INTRAVENOUS CONTINUOUS
Status: DISCONTINUED | OUTPATIENT
Start: 2018-02-19 | End: 2018-02-19 | Stop reason: HOSPADM

## 2018-02-19 RX ORDER — FENTANYL CITRATE 50 UG/ML
50 INJECTION, SOLUTION INTRAMUSCULAR; INTRAVENOUS
Status: DISCONTINUED | OUTPATIENT
Start: 2018-02-19 | End: 2018-02-19 | Stop reason: HOSPADM

## 2018-02-19 RX ORDER — NALOXONE HCL 0.4 MG/ML
0.4 VIAL (ML) INJECTION AS NEEDED
Status: DISCONTINUED | OUTPATIENT
Start: 2018-02-19 | End: 2018-02-19 | Stop reason: HOSPADM

## 2018-02-19 RX ORDER — LIDOCAINE HYDROCHLORIDE 20 MG/ML
INJECTION, SOLUTION INFILTRATION; PERINEURAL AS NEEDED
Status: DISCONTINUED | OUTPATIENT
Start: 2018-02-19 | End: 2018-02-19 | Stop reason: SURG

## 2018-02-19 RX ORDER — HYDRALAZINE HYDROCHLORIDE 20 MG/ML
5 INJECTION INTRAMUSCULAR; INTRAVENOUS
Status: DISCONTINUED | OUTPATIENT
Start: 2018-02-19 | End: 2018-02-19 | Stop reason: HOSPADM

## 2018-02-19 RX ORDER — DIPHENHYDRAMINE HYDROCHLORIDE 50 MG/ML
12.5 INJECTION INTRAMUSCULAR; INTRAVENOUS
Status: DISCONTINUED | OUTPATIENT
Start: 2018-02-19 | End: 2018-02-19 | Stop reason: HOSPADM

## 2018-02-19 RX ORDER — CLINDAMYCIN PHOSPHATE 600 MG/50ML
600 INJECTION INTRAVENOUS EVERY 8 HOURS
Status: COMPLETED | OUTPATIENT
Start: 2018-02-19 | End: 2018-02-19

## 2018-02-19 RX ORDER — LIDOCAINE HYDROCHLORIDE 10 MG/ML
0.5 INJECTION, SOLUTION EPIDURAL; INFILTRATION; INTRACAUDAL; PERINEURAL ONCE AS NEEDED
Status: DISCONTINUED | OUTPATIENT
Start: 2018-02-19 | End: 2018-02-19 | Stop reason: HOSPADM

## 2018-02-19 RX ORDER — PROMETHAZINE HYDROCHLORIDE 25 MG/1
25 TABLET ORAL ONCE AS NEEDED
Status: DISCONTINUED | OUTPATIENT
Start: 2018-02-19 | End: 2018-02-19 | Stop reason: HOSPADM

## 2018-02-19 RX ORDER — PROMETHAZINE HYDROCHLORIDE 25 MG/1
25 SUPPOSITORY RECTAL ONCE AS NEEDED
Status: DISCONTINUED | OUTPATIENT
Start: 2018-02-19 | End: 2018-02-19 | Stop reason: HOSPADM

## 2018-02-19 RX ORDER — PROMETHAZINE HYDROCHLORIDE 25 MG/ML
6.25 INJECTION, SOLUTION INTRAMUSCULAR; INTRAVENOUS ONCE AS NEEDED
Status: DISCONTINUED | OUTPATIENT
Start: 2018-02-19 | End: 2018-02-19 | Stop reason: HOSPADM

## 2018-02-19 RX ORDER — FAMOTIDINE 10 MG/ML
20 INJECTION, SOLUTION INTRAVENOUS ONCE
Status: COMPLETED | OUTPATIENT
Start: 2018-02-19 | End: 2018-02-19

## 2018-02-19 RX ORDER — NALBUPHINE HCL 10 MG/ML
2 AMPUL (ML) INJECTION EVERY 4 HOURS PRN
Status: DISCONTINUED | OUTPATIENT
Start: 2018-02-19 | End: 2018-02-19 | Stop reason: HOSPADM

## 2018-02-19 RX ORDER — MIDAZOLAM HYDROCHLORIDE 1 MG/ML
1 INJECTION INTRAMUSCULAR; INTRAVENOUS
Status: DISCONTINUED | OUTPATIENT
Start: 2018-02-19 | End: 2018-02-19 | Stop reason: HOSPADM

## 2018-02-19 RX ORDER — SODIUM CHLORIDE 0.9 % (FLUSH) 0.9 %
1-10 SYRINGE (ML) INJECTION AS NEEDED
Status: DISCONTINUED | OUTPATIENT
Start: 2018-02-19 | End: 2018-02-19 | Stop reason: HOSPADM

## 2018-02-19 RX ORDER — PROPOFOL 10 MG/ML
VIAL (ML) INTRAVENOUS CONTINUOUS PRN
Status: DISCONTINUED | OUTPATIENT
Start: 2018-02-19 | End: 2018-02-19 | Stop reason: SURG

## 2018-02-19 RX ORDER — MIDAZOLAM HYDROCHLORIDE 1 MG/ML
2 INJECTION INTRAMUSCULAR; INTRAVENOUS
Status: DISCONTINUED | OUTPATIENT
Start: 2018-02-19 | End: 2018-02-19 | Stop reason: HOSPADM

## 2018-02-19 RX ORDER — FENTANYL CITRATE 50 UG/ML
INJECTION, SOLUTION INTRAMUSCULAR; INTRAVENOUS AS NEEDED
Status: DISCONTINUED | OUTPATIENT
Start: 2018-02-19 | End: 2018-02-19 | Stop reason: SURG

## 2018-02-19 RX ADMIN — PROPOFOL 100 MCG/KG/MIN: 10 INJECTION, EMULSION INTRAVENOUS at 11:05

## 2018-02-19 RX ADMIN — SODIUM CHLORIDE, POTASSIUM CHLORIDE, SODIUM LACTATE AND CALCIUM CHLORIDE: 600; 310; 30; 20 INJECTION, SOLUTION INTRAVENOUS at 11:04

## 2018-02-19 RX ADMIN — FENTANYL CITRATE 25 MCG: 50 INJECTION INTRAMUSCULAR; INTRAVENOUS at 11:05

## 2018-02-19 RX ADMIN — CLINDAMYCIN PHOSPHATE 600 MG: 12 INJECTION, SOLUTION INTRAVENOUS at 11:05

## 2018-02-19 RX ADMIN — LIDOCAINE HYDROCHLORIDE 60 MG: 20 INJECTION, SOLUTION INFILTRATION; PERINEURAL at 11:05

## 2018-02-19 RX ADMIN — MIDAZOLAM 2 MG: 1 INJECTION INTRAMUSCULAR; INTRAVENOUS at 09:32

## 2018-02-19 RX ADMIN — SODIUM CHLORIDE, POTASSIUM CHLORIDE, SODIUM LACTATE AND CALCIUM CHLORIDE 9 ML/HR: 600; 310; 30; 20 INJECTION, SOLUTION INTRAVENOUS at 09:19

## 2018-02-19 RX ADMIN — FAMOTIDINE 20 MG: 10 INJECTION, SOLUTION INTRAVENOUS at 09:32

## 2018-02-19 NOTE — PLAN OF CARE
Problem: Perioperative Period (Adult)  Goal: Signs and Symptoms of Listed Potential Problems Will be Absent or Manageable (Perioperative Period)  Outcome: Ongoing (interventions implemented as appropriate)   02/19/18 0806   Perioperative Period   Problems Assessed (Perioperative Period) all

## 2018-02-19 NOTE — PLAN OF CARE
Problem: Patient Care Overview (Adult)  Goal: Plan of Care Review  Outcome: Ongoing (interventions implemented as appropriate)   02/19/18 0826   Coping/Psychosocial Response Interventions   Plan Of Care Reviewed With patient   Patient Care Overview   Progress improving

## 2018-02-19 NOTE — H&P
"   History & Physical       Patient: Sigrid Chen    Date of Admission: Feb 19, 2018    YOB: 1945    Medical Record Number: [id number]    Attending Physician: Karel Thomas MD        Chief Complaints: right foot pain      History of Present Illness: 72 y.o. female presents with right hallux valgus Onset of symptoms was gradual starting unknown years ago.  Symptoms are associated with progressive deformity.  Symptoms are aggravated by shoes.   Symptoms improve with nothing. Patient is now being admitted to the services of Karel Thomas MD for further evaluation and treatment.     Allergies   Allergen Reactions   • Codeine Nausea And Vomiting     Sick to stomach   • Other      PT SAID SHE CAN'T TAKE ANY TIME RELEASE MEDICINE OR ANYTHING WITH A \"COATING\" ON IT, PT DOES NOT HAVE LARGE INTESTINES SO MEDICATION WILL NOT DISSOLVE   • Latex Rash   • Nickel Rash   • Penicillins Rash       Medications:     Home Medications:  No current facility-administered medications on file prior to encounter.      Current Outpatient Prescriptions on File Prior to Encounter   Medication Sig   • BIOTIN PO Take  by mouth Daily. HOLDING FOR SURGERY   • diphenhydrAMINE (BENADRYL) 25 mg capsule Take 25 mg by mouth At Night As Needed.   • Ginkgo Biloba (GNP GINGKO BILOBA EXTRACT PO) Take 1 tablet by mouth Daily. HOLDING FOR SURGERY   • ibuprofen (ADVIL,MOTRIN) 400 MG tablet Take 400 mg by mouth Every 6 (Six) Hours As Needed for mild pain (1-3). HOLD PRIOR TO SURGERY   • metoprolol tartrate (LOPRESSOR) 25 MG tablet Take 1 tablet by mouth 2 (Two) Times a Day.   • SUMAtriptan (IMITREX) 100 MG tablet Take 100 mg by mouth 1 (One) Time As Needed.   • valACYclovir (VALTREX) 1000 MG tablet Take 1,000 mg by mouth Daily.     Current Medications:  Scheduled Meds:  clindamycin 600 mg Intravenous Q8H     Continuous Infusions:   PRN Meds:.       Past Medical History:   Diagnosis Date   • Altered bowel elimination due to intestinal " ostomy     MARTINEZ CONTINENT INTESTINAL RESERVIOR-INTUBED W/CATH RLQ (LARGE INTESTINE REMOVED)   • Anemia    • Asthma     NO CURRENT INHALERS   • Cataracts, bilateral    • Extremity pain    • Frequent UTI    • GERD (gastroesophageal reflux disease)    • Herpes simplex    • History of DVT (deep vein thrombosis)     SUPERFICIAL; DID NOT REQUIRE ANTICOAGULATION.  SAW DR. VLADIMIR MELGOZA   • History of MRSA infection 1980   • History of transfusion    • Hypertension    • Low back pain    • Lumbosacral disc disease    • Migraine    • Spinal stenosis, lumbar    • Ulcerative colitis     COLECTOMY LARGE INTESTINE        Past Surgical History:   Procedure Laterality Date   • BUNIONECTOMY Right    • COLONOSCOPY     • CONTINENT CECOSTOMY  1980    LARGE INTESTINE REMOVED   • LUMBAR DISCECTOMY FUSION INSTRUMENTATION N/A 12/16/2016    Procedure: MICROLAMINECTOMY L3 4  L4 5 W/ METRIX;  Surgeon: Valdez Greene DO;  Location: [facility];  Service:    • VASCULAR SURGERY Right     REPAIRED VEIN RIGHT LOWER EXTREMITY REPAIRED VALVE GROIN AREA   • WISDOM TOOTH EXTRACTION      FOUR        Social History     Occupational History   • Not on file.     Social History Main Topics   • Smoking status: Never Smoker   • Smokeless tobacco: Never Used   • Alcohol use 1.2 oz/week     2 Glasses of wine per week   • Drug use: No   • Sexual activity: Defer    Social History     Social History Narrative        Family History   Problem Relation Age of Onset   • Thyroid disease Mother    • Cancer Mother    • Hyperlipidemia Mother    • Alcohol abuse Father    • Cancer Sister    • Diabetes Sister    • Hypertension Brother    • Diabetes Brother    • Diabetes Brother    • Heart disease Paternal Grandmother    • Aortic aneurysm Other    • Malig Hyperthermia Neg Hx          Review of Systems:   HEENT: Patient denies any headaches, vision changes, change in hearing, or tinnitus, Patient denies any rhinorrhea,epistaxis, sinus pain, mouth or dental problems, sore  throat or hoarseness, or dysphagia  Pulmonary: Patient denies any cough, congestion, SOA, or wheezing  Cardiovascular: Patient denies any chest pain, dyspnea, palpitations, weakness, intolerance of exercise, varicosities, swelling of extremities, known murmur  Gastrointestinal:  Patient denies nausea, vomiting, diarrhea, constipation, loss  of appetite, change in appetite, dysphagia, gas, heartburn, melena, change in bowel habits, use of laxatives or other drugs to alter the function of the gastrointestinal tract.  Genital/Urinary: Patient denies dysuria, change in color of urine, change in frequency of urination, pain with urgency, incontinence, retention, or nocturia.  Musculoskeletal: With the exception of the involved extremity, the patient denies increased warmth; redness; or swelling of joints; limitation of function; deformity; crepitation: pain in a joint or an extremity, the neck, or the back, especially with movement.  Neurological: Patient denies dizziness, tremor, ataxia, difficulty in speaking, change in speech, paresthesia, loss of sensation, seizures, syncope, changes in memory.  Endocrine system: Patient denies tremors, palpitations, intolerance of heat or cold, polyuria, polydipsia, polyphagia, diaphoresis, exophthalmos, or goiter.  Psychological: Patient denies thoughts/plans or harming self or other; depression,  insomnia, night terrors, melody, memory loss, disorientation.  Skin: Patient denies any bruising, rashes, discoloration, pruritus, wounds, or changes in the hair or nails.  Hematopoietic: Patient denies history of spontaneous or excessive bleeding, epistaxis, hematuria, melena, fatigue, enlarged or tender lymph nodes, pallor, history of anemia.    Physical Exam: 72 y.o. female  General Appearance:    Alert, cooperative, in no acute distress                    There were no vitals filed for this visit.     Head:    Normocephalic, without obvious abnormality, atraumatic   Eyes:             Lids and lashes normal, conjunctivae and sclerae normal, no   icterus, no pallor, corneas clear, PERRLA   Ears:    Ears appear intact with no abnormalities noted   Throat:   No oral lesions, no thrush, oral mucosa moist   Neck:   No adenopathy, supple, trachea midline, no thyromegaly, no   carotid bruit, no JVD   Back:     No kyphosis present, no scoliosis present, no skin lesions,      erythema or scars, no tenderness to percussion or                   palpation,   range of motion normal   Lungs:     Clear to auscultation,respirations regular, even and                  unlabored    Heart:    Regular rhythm and normal rate, normal S1 and S2, no            murmur, no gallop, no rub, no click   Chest Wall:    No abnormalities observed   Abdomen:     Normal bowel sounds, no masses, no organomegaly, soft        non-tender, non-distended, no guarding, no rebound                tenderness   Rectal:     Deferred   Extremities:   Tenderness over right foot  . Moves all extremities well, no edema,   no cyanosis, no redness   Pulses:   Pulses palpable and equal bilaterally   Skin:   No bleeding, bruising or rash   Lymph nodes:   No palpable adenopathy   Neurologic:   Cranial nerves 2 - 12 grossly intact, sensation intact, DTR       present and equal bilaterally           Diagnostic Tests:              No results found for: CRYSTAL]  No results found for: CULTURE]  No results found for: URICACID]    No results found.    Assessment:  Patient Active Problem List   Diagnosis   • Nonfamilial nocturnal leg cramps   • Atypical migraine   • Vitamin D deficiency   • Cervicogenic headache   • Essential hypertension   • Gastroesophageal reflux disease   • Cervico-occipital neuralgia   • Congenital pes planus   • Medicare annual wellness visit, initial   • Intestinal malabsorption           Plan:  The patient voiced understanding of the risks, benefits, and alternative forms of treatment that were discussed. All questions were answered  and the patient consents to proceed with the procedure as planned.        Discharge Plan: today to home      Date: 2/19/2018    Karel Thomas MD    EMR Dragon/Transcription disclaimer:   Much of this encounter note is an electronic transcription/translation of spoken language to printed text. The electronic translation of spoken language may permit erroneous, or at times, nonsensical words or phrases to be inadvertently transcribed; Although I have reviewed the note for such errors, some may still exist.

## 2018-02-19 NOTE — ANESTHESIA PREPROCEDURE EVALUATION
Anesthesia Evaluation     no history of anesthetic complications:  NPO Solid Status: > 8 hours             Airway   Mallampati: II  TM distance: >3 FB  Neck ROM: full  no difficulty expected  Dental - normal exam     Pulmonary - normal exam   (+) asthma,   Cardiovascular     ECG reviewed  Rhythm: regular    (+) hypertension well controlled,   (-) murmur, carotid bruits      Neuro/Psych  (+) headaches,     GI/Hepatic/Renal/Endo    (+)  GERD,     Musculoskeletal     (+) neck pain,   Abdominal    Substance History      OB/GYN          Other                        Anesthesia Plan    ASA 2     MAC   (D/W pt. MAC and possible awareness intra op.  Pt understands MAC and GA are not the same and the possibility of GA being required for failed MAC)  intravenous induction   Anesthetic plan and risks discussed with patient.

## 2018-02-19 NOTE — ANESTHESIA POSTPROCEDURE EVALUATION
Patient: Sigrid Chen    Procedure Summary     Date Anesthesia Start Anesthesia Stop Room / Location    02/19/18 1101 1222  [facility]  /  [facility]       Procedure Diagnosis Surgeon Provider    RT BUNIONECTOMY WITH DOUBLE OSTEOTOMY 2ND INTERPHALANGEAL ARTHROPLASTY  (Right Foot) No diagnosis on file. MD Ramos Soriano MD          Anesthesia Type: MAC  Last vitals  BP   135/86 (02/19/18 1230)   Temp   36.6 °C (97.9 °F) (02/19/18 1220)   Pulse   63 (02/19/18 1230)   Resp   18 (02/19/18 1230)     SpO2   100 % (02/19/18 1230)     Post Anesthesia Care and Evaluation    Patient location during evaluation: bedside  Patient participation: complete - patient participated  Level of consciousness: awake and alert  Pain management: adequate  Airway patency: patent  Anesthetic complications: No anesthetic complications    Cardiovascular status: acceptable  Respiratory status: acceptable  Hydration status: acceptable    Comments: /86  Pulse 63  Temp 36.6 °C (97.9 °F) (Tympanic)   Resp 18  SpO2 100%

## 2018-02-19 NOTE — OP NOTE
Orthopedic Operative Report      Patient: Sigrid Chen  YOB: 1945  Medical Record Number: [id number]  Date: 2/19/2018    Attending Provider: Karel Thomas MD  Assistant: none     Pre-op Diagnosis:   Right Hallux Valgus and Hallux Valgus Interphalangeus, second hammertoe with interdigital corn    Post Procedure Diagnosis: Same    Anesthesia: Monitor Anesthesia Care   Complications: None  Estimated Blood Loss: minimal  Specimen: None  Drain: none      Surgical Procedure: Right Bunionectomy with Double Osteotomy (Chevron plus Akin), right second toe interphalangeal arthrtoplasty                             After appropriate preoperative consultation, the patient was made comfortable in the supine position on the operating table and parenteral antibiotics administered.  A surgical pause was undertaken to identify the appropriate patient, surgical site, surgeon, and the surgeon’s initials on the operative limb.  After satisfactory IV sedation, 28 cc of 0.5% Marcaine plain were used for foot and ankle regional anesthesia by surgeon.  A well-padded supramalleolar ankle tourniquet was applied, foot was exsanguinated after antibiotics are instilled and the tourniquet inflated to 250 mmHg.   Prep and drape were done in the usual sterile fashion.     A medial midline longitudinal incision was made over the medial eminence and carried onto the base of the toe in the midline.  Sharp dissection was taken through dermis and blunt dissection used deep to that protecting the neurovascular structures and using electrocautery as needed for hemostasis.  Thick flaps were raised protective of these neurovascular structures after making a Y-shaped longitudinally oriented capsulotomy over the first MTP.  Medial eminence was resected with a microsagittal saw parallel to the medial border of the foot and dorsomedial and plantar medial prominences made smooth with rongeur and rasp.  Irrigation was done.  No lateral  stripping was done whatsoever and plantar and dorsal soft tissue insertions were preserved.  Dorsal cheilectomy was done where necessary.  I next designed and completed a distal first metatarsal Chevron osteotomy taking care to have the plantar limb of osteotomy exit the bone proximal to the sesamoid complex.  Capital fragment was translated laterally, again without lateral stripping or incision, and axially impacted.  Redundant bone was removed from the proximal segment with a microsagittal saw parallel to medial border of the foot and bone.  The osteotomy was fixed with well countersunk fixation device. The joint was irrigated and hemostasis was checked. Range of motion was very good and the osteotomy stable.      We next dissected in the medial midline on the proximal metaphysis of the great toe proximal phalanx.  Very minimal periosteal stripping was done and no lateral stripping was done.  Flaps were raised protective of the long flexor and extensor, as well as the short flexor and extensor and the neurovascular structures.  Retractors were placed.      I next designed and completed with a microsagittal saw an extra-articular, closing wedge, varus producing, medially based osteotomy of the proximal phalanx to the great toe proximal metaphysis.  The wedge of bone was removed.  A saw blade was brought up to, but not through the lateral cortex.   The osteotomy was then completed and green-sticked in the appropriate neutral position in all three planes closing down the gap made by the wedge resection.  This was carefully positioned in all planes including rotation in neutral posture.  Fixation with a 12mm Plaple and screw was excellent.  The wounds were irrigated and hemostasis obtained.     Range of motion remained good.  Redundant capsule was excised and a 2-0 Vicryl capsulorrhaphy done.  3-0 and 4-0 Vicryl were used in inverted simple suture technique to approximate the remainder of the soft tissues.  Skin edges  were apposed without tension using staples and horizontal mattress sutures where needed.     An elliptical resection of clavus,  skin, dermis was done over the medial side of the second toe PIP. PIP arthroplasty was then done with sharp technique to remove the prominence without destablizing the toe.  Correction was excellent Hemiphalangectomy was done judiciously.  This was irrigated and closed with prolene sutures.      A soft dressing was applied.  Circulation returned promptly upon releasing the tourniquet.   Irrigation had been done and hemostasis had been checked prior to closure.  The patient left the operating room in good condition after having tolerated the procedure well.      Karel Thomas MD   2/19/2018   12:13 PM      EMR Dragon/Transcription disclaimer:    Much of this encounter note is an electronic transcription/translation of spoken language to printed text. The electronic translation of spoken language may permit erroneous, or at times, nonsensical words or phrases to be inadvertently transcribed; although I have reviewed the note for such errors, some may still exist.

## 2018-02-19 NOTE — DISCHARGE INSTRUCTIONS
WHAT YOU CAN EXPECT  1. Swelling:  This is to be expected. It is difficult to specify what constitutes as an abnormal amount of swelling. You can minimize this by staying off your feet, keeping the foot elevated and applying ice.  2. Pain:  Everyone experiences pain, unfortunately, some worse than others.  You will be given a prescription for pain medication before you leave the hospital, either by Dr. Thomas or one of his assistants.  Please feel free to check with us if you are allergic to any pain medication. If you have had local anesthesia, start taking the medication when the anesthesia begins to wear off.  3. Bleeding:  This always occurs.  You will notice slight oozing occasionally through the bandages.  If bleeding continues and soaks through the dressing please call us.    WHAT TO DO AFTER SURGERY  1. When you return home you must rest.  You may either sit in a chair or in bed, with the foot elevated.  The position of the foot should be above the level of the heart.  Propping the foot up on a few pillows always helps.  2. Do not do any excessive or unnecessary walking during the first few days after surgery.  May get up during the first 48 hours only to eat and use the restroom.  Each operation is slightly different, and you may be specifically told that no walking is allowed at all for a period of time.  Under these circumstances, you will be best off using a crutch or walker.  3. If you are given a special shoe to use after surgery, you may not walk without it.  You do not, however, have to wear the shoe at night.  You will find it easier to commence walking on your heel and put more weight on the flat foot over the course of the next few days.  4. Use ice over the foot for about 24 hours after surgery.  The small commercially available ice packs are useless.  Fill a large garbage bag with ice and prop the bag over the foot, not on the ankle.  Alternatively, place the ice bag on the bed and turn on your  side with the foot lying directly on the ice pack.  5. If you are taking pain medication, there are common side effects such as dizziness, drowsiness, and nausea.  If these or an allergic reaction occur, stop taking the medication and call us.  To prevent nausea, eat prior to taking the medication.    DO NOT DO ANY OF THE FOLLOWING  1. Do not get the bandages wet.  When bathing, either sponge bathe or hang the foot over the side of the bath.  The safest is to get into the empty tub with the shoe on, elevate the foot, and then fill the tub.  Preferably, empty the tub prior to getting out.  Showering is possible with commercial plastic protectors.  2. Don't remove your dressing unless specifically instructed to do so. You may loosen the elastic wrap if needed for tightness.    *Please understand that the postoperative course varies from patient to patient, and these are only meant as guidelines to a smoother recovery.  These instructions do not cover all aspects of your post-operative care and recovery and if you have any questions which you feel are unanswered by this instruction sheet, please call us.    Please call us if you develop a fever associated with redness or drainage around the wound.  We are interested in your prompt and healthy recovery.  Please cooperate with the above instructions. Please call the office for a follow-up appt at [phone].

## 2018-03-28 ENCOUNTER — TELEPHONE (OUTPATIENT)
Dept: INTERNAL MEDICINE | Facility: CLINIC | Age: 73
End: 2018-03-28

## 2021-03-09 DIAGNOSIS — Z23 IMMUNIZATION DUE: ICD-10-CM

## 2022-04-01 ENCOUNTER — TRANSCRIBE ORDERS (OUTPATIENT)
Dept: GENERAL RADIOLOGY | Facility: HOSPITAL | Age: 77
End: 2022-04-01

## 2022-04-01 DIAGNOSIS — N39.0 URINARY TRACT INFECTION WITHOUT HEMATURIA, SITE UNSPECIFIED: Primary | ICD-10-CM

## 2022-04-05 ENCOUNTER — HOSPITAL ENCOUNTER (OUTPATIENT)
Dept: GENERAL RADIOLOGY | Facility: HOSPITAL | Age: 77
Discharge: HOME | End: 2022-04-05
Admitting: RADIOLOGY

## 2022-04-05 VITALS
HEART RATE: 71 BPM | HEIGHT: 65 IN | BODY MASS INDEX: 24.32 KG/M2 | RESPIRATION RATE: 18 BRPM | WEIGHT: 146 LBS | DIASTOLIC BLOOD PRESSURE: 90 MMHG | OXYGEN SATURATION: 97 % | SYSTOLIC BLOOD PRESSURE: 156 MMHG | TEMPERATURE: 98.7 F

## 2022-04-05 DIAGNOSIS — N39.0 URINARY TRACT INFECTION WITHOUT HEMATURIA, SITE UNSPECIFIED: ICD-10-CM

## 2022-04-05 PROCEDURE — 96365 THER/PROPH/DIAG IV INF INIT: CPT

## 2022-04-05 PROCEDURE — 25010000002 ERTAPENEM PER 500 MG: Performed by: UROLOGY

## 2022-04-05 PROCEDURE — C1751 CATH, INF, PER/CENT/MIDLINE: HCPCS

## 2022-04-05 PROCEDURE — 0 LIDOCAINE 1 % SOLUTION: Performed by: UROLOGY

## 2022-04-05 RX ORDER — MULTIPLE VITAMINS W/ MINERALS TAB 9MG-400MCG
TAB ORAL
COMMUNITY

## 2022-04-05 RX ORDER — CALCIUM CARBONATE 260MG(650)
TABLET,CHEWABLE ORAL
COMMUNITY
End: 2022-11-14

## 2022-04-05 RX ORDER — LIDOCAINE HYDROCHLORIDE 10 MG/ML
10 INJECTION, SOLUTION INFILTRATION; PERINEURAL ONCE
Status: COMPLETED | OUTPATIENT
Start: 2022-04-05 | End: 2022-04-05

## 2022-04-05 RX ORDER — POTASSIUM CHLORIDE 750 MG/1
10 TABLET, FILM COATED, EXTENDED RELEASE ORAL DAILY
COMMUNITY

## 2022-04-05 RX ORDER — IBUPROFEN 400 MG/1
400 TABLET ORAL
COMMUNITY
End: 2022-04-06

## 2022-04-05 RX ORDER — LIDOCAINE HYDROCHLORIDE 10 MG/ML
10 INJECTION, SOLUTION INFILTRATION; PERINEURAL ONCE
Status: DISCONTINUED | OUTPATIENT
Start: 2022-04-05 | End: 2022-04-05

## 2022-04-05 RX ORDER — ESTRADIOL 0.1 MG/G
CREAM VAGINAL
COMMUNITY
Start: 2022-03-21 | End: 2022-07-15

## 2022-04-05 RX ADMIN — ERTAPENEM 1 G: 1 INJECTION INTRAMUSCULAR; INTRAVENOUS at 14:44

## 2022-04-05 RX ADMIN — LIDOCAINE HYDROCHLORIDE 6 ML: 10 INJECTION, SOLUTION INFILTRATION; PERINEURAL at 14:16

## 2022-04-05 NOTE — DISCHARGE INSTRUCTIONS
EDUCATION INSTRUCTIONS PICC LINE  INSERTION   A peripherally inserted central catheter (PICC) line is a silicone or polyurethane soft tube that a specially trained nurse/physician inserts into the appropriate vein in your arm.  It is then threaded into a large vein.  This provides a safe, reliable access for drugs, IV fluids and blood sampling.  You will lie with your arm extended from  Your body.  A tourniquet will be placed on your upper arm to distend the vessels and the appropriate site will be chosen. (An ultra sound machine may be used to locate the blood vessel)   The  tourniquet will be loosened while measurements are made to determine the distance from the insertion site to the location of the desired tip placement.  Your arm will be cleansed with antiseptic swabs.  The tourniquet will be reapplied and sterile drapes will be placed around the insertion site.  A local anesthetic will be used to numb the insertion site.  The catheter will be inserted into the selected vessel, the tourniquet will be removed and the catheter will be threaded to the location determined by the previous measurements.  You may be asked to turn your head (chin on shoulder) toward the puncture site to aid in the prevention of improper placement. A chest xray will be performed to ensure proper placement.  An adhesive dressing will be applied leaving a short portion of the catheter visible.   RISKS OF THE PROCEDURE INCLUDE, BUT ARE NOT LIMITED TO:  Infection, air clotting, catheter tip moving, catheter blockage and phlebitis.  The patient must be careful of the tube that extends outside the dressing.  BENEFITS OF THE PROCEDURE:  It can be used repeatedly for medications, blood or blood withdrawal for several months.  It also reduces the number of needle sticks a patient must endure.  ALTERNATIVES:  A central line catheter placed in the subclavian vein or jugular vein or implanted vascular access device.  RISK OF ALTERNATIVES:   Infection, clot formation, tubing that lays outside of the dressing that require routine flushing and collapsed lung.  BENEFIT TO ALTERNATIVES:  You can administer large amounts of IV fluids.  Blood samples can be drawn repeatedly and it reduces the number of sticks a patient must endure.    I HAVE READ OR THIS FORM WAS READ TO ME AND I FULLY UNDERSTAND THE PROCEDURE BEING PERFORMED.     PATIENT INITIALS_____________________TIME___1320_______________      PICC LINE CARE INSTRUCTIONS WERE GIVEN TO ME PRIOR TO DISCHARGE AND I FULLY UNDERSTAND THE INSTRUCTIONS.

## 2022-04-05 NOTE — NURSING NOTE
Patient arrived in xray triage for a PICC line placement. Protective goggles and mask in place with all patient interactions today.

## 2022-04-05 NOTE — NURSING NOTE
Monitored patient 30 minutes after IV infusion. No problems or concerns noted. Patient ambulated to main entrance for discharge.

## 2022-04-05 NOTE — NURSING NOTE
Discharge instructions provided and reviewed with patient. Patient verbalized understanding and was able to teach back instructions. PICC line ID card provided to patient.

## 2022-04-06 ENCOUNTER — HOSPITAL ENCOUNTER (OUTPATIENT)
Dept: INFUSION THERAPY | Facility: HOSPITAL | Age: 77
Discharge: HOME | End: 2022-04-06
Admitting: UROLOGY

## 2022-04-06 VITALS
RESPIRATION RATE: 20 BRPM | DIASTOLIC BLOOD PRESSURE: 80 MMHG | TEMPERATURE: 95.7 F | SYSTOLIC BLOOD PRESSURE: 146 MMHG | HEART RATE: 70 BPM | OXYGEN SATURATION: 99 %

## 2022-04-06 DIAGNOSIS — N39.0 URINARY TRACT INFECTION WITHOUT HEMATURIA, SITE UNSPECIFIED: ICD-10-CM

## 2022-04-06 DIAGNOSIS — Z87.440 HISTORY OF UTI: Primary | ICD-10-CM

## 2022-04-06 PROCEDURE — 96365 THER/PROPH/DIAG IV INF INIT: CPT

## 2022-04-06 PROCEDURE — 25010000002 ERTAPENEM PER 500 MG: Performed by: UROLOGY

## 2022-04-06 RX ADMIN — ERTAPENEM 1 G: 1 INJECTION INTRAMUSCULAR; INTRAVENOUS at 09:04

## 2022-04-06 NOTE — PROGRESS NOTES
Patient tolerated infusion without complaints. No S/S reaction noted. Patient D/C'd ambulatory from [facility] at 0943 post infusion.   After visit summary offered, patient stated she did not need as she already has all of her appointments in her calendar and received first dose of Invanz yesterday in radiology triage.

## 2022-04-07 ENCOUNTER — HOSPITAL ENCOUNTER (OUTPATIENT)
Dept: INFUSION THERAPY | Facility: HOSPITAL | Age: 77
Discharge: HOME | End: 2022-04-07
Admitting: UROLOGY

## 2022-04-07 VITALS
DIASTOLIC BLOOD PRESSURE: 87 MMHG | SYSTOLIC BLOOD PRESSURE: 149 MMHG | HEART RATE: 67 BPM | OXYGEN SATURATION: 100 % | TEMPERATURE: 96.4 F | RESPIRATION RATE: 20 BRPM

## 2022-04-07 DIAGNOSIS — N39.0 URINARY TRACT INFECTION WITHOUT HEMATURIA, SITE UNSPECIFIED: ICD-10-CM

## 2022-04-07 DIAGNOSIS — Z87.440 HISTORY OF UTI: Primary | ICD-10-CM

## 2022-04-07 PROCEDURE — G0463 HOSPITAL OUTPT CLINIC VISIT: HCPCS

## 2022-04-07 PROCEDURE — 25010000002 ERTAPENEM PER 500 MG: Performed by: UROLOGY

## 2022-04-07 PROCEDURE — 96365 THER/PROPH/DIAG IV INF INIT: CPT

## 2022-04-07 RX ADMIN — ERTAPENEM 1 G: 1 INJECTION INTRAMUSCULAR; INTRAVENOUS at 09:22

## 2022-04-07 NOTE — PROGRESS NOTES
RN called office, spoke to Dr. Carlos Mackenzie staff who state Dr. Carlos Mackenzie wants patient to come into the office on Wednesday 4/13 at 1:45pm to give urine sample. Patient is to keep PICC line in until MD follows up with urine results and will notify patient of results.

## 2022-04-08 ENCOUNTER — HOSPITAL ENCOUNTER (OUTPATIENT)
Dept: INFUSION THERAPY | Facility: HOSPITAL | Age: 77
Discharge: HOME | End: 2022-04-08
Admitting: UROLOGY

## 2022-04-08 VITALS
RESPIRATION RATE: 18 BRPM | DIASTOLIC BLOOD PRESSURE: 79 MMHG | TEMPERATURE: 96.9 F | SYSTOLIC BLOOD PRESSURE: 138 MMHG | OXYGEN SATURATION: 98 % | HEART RATE: 70 BPM

## 2022-04-08 DIAGNOSIS — Z87.440 HISTORY OF UTI: Primary | ICD-10-CM

## 2022-04-08 DIAGNOSIS — N39.0 URINARY TRACT INFECTION WITHOUT HEMATURIA, SITE UNSPECIFIED: ICD-10-CM

## 2022-04-08 PROCEDURE — 96365 THER/PROPH/DIAG IV INF INIT: CPT

## 2022-04-08 PROCEDURE — 25010000002 ERTAPENEM PER 500 MG: Performed by: UROLOGY

## 2022-04-08 RX ADMIN — ERTAPENEM 1 G: 1 INJECTION INTRAMUSCULAR; INTRAVENOUS at 09:45

## 2022-04-09 ENCOUNTER — HOSPITAL ENCOUNTER (OUTPATIENT)
Dept: INFUSION THERAPY | Facility: HOSPITAL | Age: 77
Discharge: HOME | End: 2022-04-09
Admitting: UROLOGY

## 2022-04-09 VITALS
DIASTOLIC BLOOD PRESSURE: 89 MMHG | OXYGEN SATURATION: 97 % | HEART RATE: 67 BPM | SYSTOLIC BLOOD PRESSURE: 135 MMHG | TEMPERATURE: 97.8 F | RESPIRATION RATE: 18 BRPM

## 2022-04-09 DIAGNOSIS — Z87.440 HISTORY OF UTI: Primary | ICD-10-CM

## 2022-04-09 DIAGNOSIS — N39.0 URINARY TRACT INFECTION WITHOUT HEMATURIA, SITE UNSPECIFIED: ICD-10-CM

## 2022-04-09 PROCEDURE — 96365 THER/PROPH/DIAG IV INF INIT: CPT

## 2022-04-09 PROCEDURE — 25010000002 ERTAPENEM PER 500 MG: Performed by: UROLOGY

## 2022-04-09 RX ADMIN — ERTAPENEM SODIUM 1 G: 1 INJECTION, POWDER, LYOPHILIZED, FOR SOLUTION INTRAMUSCULAR; INTRAVENOUS at 08:34

## 2022-04-10 ENCOUNTER — HOSPITAL ENCOUNTER (OUTPATIENT)
Dept: INFUSION THERAPY | Facility: HOSPITAL | Age: 77
Discharge: HOME | End: 2022-04-10
Admitting: UROLOGY

## 2022-04-10 VITALS
SYSTOLIC BLOOD PRESSURE: 144 MMHG | OXYGEN SATURATION: 97 % | DIASTOLIC BLOOD PRESSURE: 82 MMHG | HEART RATE: 66 BPM | RESPIRATION RATE: 20 BRPM | TEMPERATURE: 96.9 F

## 2022-04-10 DIAGNOSIS — N39.0 URINARY TRACT INFECTION WITHOUT HEMATURIA, SITE UNSPECIFIED: ICD-10-CM

## 2022-04-10 DIAGNOSIS — Z87.440 HISTORY OF UTI: Primary | ICD-10-CM

## 2022-04-10 PROCEDURE — 25010000002 ERTAPENEM PER 500 MG: Performed by: UROLOGY

## 2022-04-10 PROCEDURE — 96365 THER/PROPH/DIAG IV INF INIT: CPT

## 2022-04-10 RX ADMIN — ERTAPENEM SODIUM 1 G: 1 INJECTION, POWDER, LYOPHILIZED, FOR SOLUTION INTRAMUSCULAR; INTRAVENOUS at 08:33

## 2022-04-11 ENCOUNTER — HOSPITAL ENCOUNTER (OUTPATIENT)
Dept: INFUSION THERAPY | Facility: HOSPITAL | Age: 77
Discharge: HOME | End: 2022-04-11
Admitting: UROLOGY

## 2022-04-11 VITALS
HEART RATE: 70 BPM | SYSTOLIC BLOOD PRESSURE: 147 MMHG | RESPIRATION RATE: 10 BRPM | TEMPERATURE: 96.4 F | DIASTOLIC BLOOD PRESSURE: 84 MMHG | OXYGEN SATURATION: 100 %

## 2022-04-11 DIAGNOSIS — N39.0 URINARY TRACT INFECTION WITHOUT HEMATURIA, SITE UNSPECIFIED: ICD-10-CM

## 2022-04-11 DIAGNOSIS — Z87.440 HISTORY OF UTI: Primary | ICD-10-CM

## 2022-04-11 PROCEDURE — 25010000002 ERTAPENEM PER 500 MG: Performed by: UROLOGY

## 2022-04-11 PROCEDURE — 96365 THER/PROPH/DIAG IV INF INIT: CPT

## 2022-04-11 PROCEDURE — G0463 HOSPITAL OUTPT CLINIC VISIT: HCPCS

## 2022-04-11 RX ADMIN — ERTAPENEM SODIUM 1 G: 1 INJECTION, POWDER, LYOPHILIZED, FOR SOLUTION INTRAMUSCULAR; INTRAVENOUS at 08:57

## 2022-04-16 ENCOUNTER — HOSPITAL ENCOUNTER (EMERGENCY)
Facility: HOSPITAL | Age: 77
Discharge: HOME | End: 2022-04-16
Attending: EMERGENCY MEDICINE | Admitting: EMERGENCY MEDICINE

## 2022-04-16 ENCOUNTER — APPOINTMENT (OUTPATIENT)
Dept: CARDIOLOGY | Facility: HOSPITAL | Age: 77
End: 2022-04-16

## 2022-04-16 ENCOUNTER — NURSE TRIAGE (OUTPATIENT)
Dept: CALL CENTER | Facility: HOSPITAL | Age: 77
End: 2022-04-16

## 2022-04-16 VITALS
RESPIRATION RATE: 18 BRPM | SYSTOLIC BLOOD PRESSURE: 165 MMHG | DIASTOLIC BLOOD PRESSURE: 97 MMHG | HEART RATE: 76 BPM | OXYGEN SATURATION: 96 % | TEMPERATURE: 97.9 F

## 2022-04-16 DIAGNOSIS — L24.5 IRRITANT CONTACT DERMATITIS DUE TO OTHER CHEMICAL PRODUCTS: Primary | ICD-10-CM

## 2022-04-16 LAB
BH CV UPPER VENOUS LEFT INTERNAL JUGULAR AUGMENT: NORMAL
BH CV UPPER VENOUS LEFT INTERNAL JUGULAR COMPETENT: NORMAL
BH CV UPPER VENOUS LEFT INTERNAL JUGULAR COMPRESS: NORMAL
BH CV UPPER VENOUS LEFT INTERNAL JUGULAR PHASIC: NORMAL
BH CV UPPER VENOUS LEFT INTERNAL JUGULAR SPONT: NORMAL
BH CV UPPER VENOUS LEFT SUBCLAVIAN AUGMENT: NORMAL
BH CV UPPER VENOUS LEFT SUBCLAVIAN COMPETENT: NORMAL
BH CV UPPER VENOUS LEFT SUBCLAVIAN COMPRESS: NORMAL
BH CV UPPER VENOUS LEFT SUBCLAVIAN PHASIC: NORMAL
BH CV UPPER VENOUS LEFT SUBCLAVIAN SPONT: NORMAL
BH CV UPPER VENOUS RIGHT AXILLARY AUGMENT: NORMAL
BH CV UPPER VENOUS RIGHT AXILLARY COMPETENT: NORMAL
BH CV UPPER VENOUS RIGHT AXILLARY COMPRESS: NORMAL
BH CV UPPER VENOUS RIGHT AXILLARY PHASIC: NORMAL
BH CV UPPER VENOUS RIGHT AXILLARY SPONT: NORMAL
BH CV UPPER VENOUS RIGHT BASILIC FOREARM COMPRESS: NORMAL
BH CV UPPER VENOUS RIGHT BASILIC UPPER COMPRESS: NORMAL
BH CV UPPER VENOUS RIGHT BRACHIAL COMPRESS: NORMAL
BH CV UPPER VENOUS RIGHT CEPHALIC FOREARM COMPRESS: NORMAL
BH CV UPPER VENOUS RIGHT CEPHALIC UPPER COMPRESS: NORMAL
BH CV UPPER VENOUS RIGHT INTERNAL JUGULAR AUGMENT: NORMAL
BH CV UPPER VENOUS RIGHT INTERNAL JUGULAR COMPETENT: NORMAL
BH CV UPPER VENOUS RIGHT INTERNAL JUGULAR COMPRESS: NORMAL
BH CV UPPER VENOUS RIGHT INTERNAL JUGULAR PHASIC: NORMAL
BH CV UPPER VENOUS RIGHT INTERNAL JUGULAR SPONT: NORMAL
BH CV UPPER VENOUS RIGHT RADIAL COMPRESS: NORMAL
BH CV UPPER VENOUS RIGHT SUBCLAVIAN AUGMENT: NORMAL
BH CV UPPER VENOUS RIGHT SUBCLAVIAN COMPETENT: NORMAL
BH CV UPPER VENOUS RIGHT SUBCLAVIAN COMPRESS: NORMAL
BH CV UPPER VENOUS RIGHT SUBCLAVIAN PHASIC: NORMAL
BH CV UPPER VENOUS RIGHT SUBCLAVIAN SPONT: NORMAL
BH CV UPPER VENOUS RIGHT ULNAR COMPRESS: NORMAL
MAXIMAL PREDICTED HEART RATE: 143 BPM
STRESS TARGET HR: 122 BPM

## 2022-04-16 PROCEDURE — 99283 EMERGENCY DEPT VISIT LOW MDM: CPT

## 2022-04-16 PROCEDURE — 93971 EXTREMITY STUDY: CPT

## 2022-04-16 NOTE — TELEPHONE ENCOUNTER
"  Has been having IV infusions since 04/16 now has order for it to be removed next week 04/18, but is noticing a lot more redness at site and above site, and arm is really itchy, told her to be seen. Port may need taking out now  Reason for Disposition  • [1] Skin redness AND [2] extends > 1 inch (2.5 cm) from IV site    Additional Information  • Negative: SEVERE difficulty breathing (e.g., struggling for each breath, speaks in single words)  • Negative: Shock suspected (e.g., cold/pale/clammy skin, too weak to stand, low BP, rapid pulse)  • Negative: Sounds like a life-threatening emergency to the triager  • Negative: IV not running or running slowly  • Negative: [1] Difficulty breathing AND [2] not severe  • Negative: Arm is swollen, new-onset (or leg swelling if IV in lower extremity)  • Negative: Fever > 100.4 F (38.0 C)  • Negative: Patient sounds very sick or weak to the triager  • Negative: Pus or cloudy fluid from IV site  • Negative: [1] Red streak at IV site AND [2] palpable cord  • Negative: [1] Red streak at IV site AND [2] longer than 1 inch (2.5 cm)  • Negative: Skin swelling at IV site (Exception: IV recently removed and small area of skin swelling)  • Negative: [1] Raised bruise at IV site AND [2] size > 2 inches (5 cm) AND [3] expanding    Answer Assessment - Initial Assessment Questions  1. SYMPTOM:  \"What's the main symptom you're concerned about?\" (e.g., pain, redness, swelling, pus)      Redness and tichy  2. ONSET: \"When did the redness  start?\"      24 hours  3. IV WHAT: \"What kind of IV line do you have?\" (e.g., central line, PICC, peripheral IV)     PICC  4. IV WHERE: \"Where does the IV enter your body?\"      Right arm upper  5. IV WHEN: \"When was this IV put in?\"      04/06/2022  6. IV WHY: \"Why do you have this IV line?\"      infusions  7. IV RUNNING OK: \"Is the IV running OK?\" (e.g., running OK, running slowly, not running, unable to flush)       It was now to be d/c 2 days  8. PAIN: \"Is " "there any pain?\" If Yes, ask: \"How bad is the pain?\"   (e.g., scale 1-10; or mild, moderate, severe)      Uncomfortable   9. OTHER SYMPTOMS: \"Do you have any other symptoms?\" (e.g., fever, shaking chills, new weakness, pus)      Redness maybe warm feeling  10. VISITING NURSE: \"Do you have a visiting nurse?\" (e.g., home health nurse, IV infusion nurse)        no    Protocols used: IV SITE (SKIN) SYMPTOMS-ADULT-AH      "

## 2022-04-16 NOTE — ED TRIAGE NOTES
Pt reports right upper arm redness and itching started 2 days ago. Pt has picc line in right upper arm, scheduled to have taken out on Monday. Called MD told to come to ER.

## 2022-04-16 NOTE — ED PROVIDER NOTES
EMERGENCY DEPARTMENT ENCOUNTER    Room Number:  12/12  Date of encounter:  4/16/2022  PCP: Florinda Weiss MD  Historian: Pt    Patient was placed in face mask during triage process. Patient was wearing facemask when I entered the room and throughout our encounter. I wore full protective equipment throughout this patient encounter including a face mask, eye protection, and gloves. Hand hygiene was performed before donning protective equipment and again following doffing of PPE after leaving the room.    HPI:  Chief Complaint: Irritation near PICC line  A complete HPI/ROS/PMH/PSH/SH/FH are unobtainable due to: N/A   Context: Sigrid Chen is a 77 y.o. female who presents to the ED c/o irritation that developed near her PICC line over the last 24 to 48 hours.  She notes she feels back the dressing a little bit as it was itching significantly.  She notes a small area of rash.  Has had no erythema, significant swelling of the hand or forearm distally.  She denies any proximal discomfort.  She had no fevers, chest pain, shortness of breath, cough.  Patient reports that the PICC line was placed on April 6 to treat resistant UTI.  Her primary urologist is Dr. Carlos Mackenzie.  She reports that her treatment regimen is complete and that she is scheduled for PICC line removal on Monday.      MEDICAL HISTORY REVIEW  EMR reviewed:      PAST MEDICAL HISTORY  Active Ambulatory Problems     Diagnosis Date Noted   • Nonfamilial nocturnal leg cramps 04/18/2016   • Atypical migraine 09/26/2013   • Vitamin D deficiency 11/25/2014   • Cervicogenic headache 08/05/2014   • Essential hypertension 11/25/2014   • Gastroesophageal reflux disease 01/03/2014   • Cervico-occipital neuralgia 08/05/2014   • Congenital pes planus 01/15/2015   • Medicare annual wellness visit, initial 10/05/2017   • Intestinal malabsorption 10/05/2017   • History of UTI 04/06/2022   • Urinary tract infectious disease 04/06/2022     Resolved Ambulatory  Problems     Diagnosis Date Noted   • Acute bronchitis 03/18/2016   • Lumbar spinal stenosis 12/16/2016     Past Medical History:   Diagnosis Date   • Altered bowel elimination due to intestinal ostomy (HCC)    • Anemia    • Asthma    • Cataracts, bilateral    • Extremity pain    • Frequent UTI    • GERD (gastroesophageal reflux disease)    • Herpes simplex    • History of DVT (deep vein thrombosis)    • History of MRSA infection 1980   • History of transfusion    • Hypertension    • Low back pain    • Lumbosacral disc disease    • Migraine    • Spinal stenosis, lumbar    • Ulcerative colitis (HCC)          PAST SURGICAL HISTORY  Past Surgical History:   Procedure Laterality Date   • BUNIONECTOMY Right    • BUNIONECTOMY Right 02/19/2018    Procedure: RT BUNIONECTOMY WITH DOUBLE OSTEOTOMY 2ND INTERPHALANGEAL ARTHROPLASTY ;  Surgeon: Karel Thomas MD;  Location: [facility];  Service:    • COLONOSCOPY     • CONTINENT CECOSTOMY  1980    LARGE INTESTINE REMOVED, Siobhan continent Ileostomy in 1980, pouch is internal, stoma on the outside   • LUMBAR DISCECTOMY FUSION INSTRUMENTATION N/A 12/16/2016    Procedure: MICROLAMINECTOMY L3 4  L4 5 W/ METRIX;  Surgeon: Valdez Greene DO;  Location: [facility];  Service:    • VASCULAR SURGERY Right     REPAIRED VEIN RIGHT LOWER EXTREMITY REPAIRED VALVE GROIN AREA   • WISDOM TOOTH EXTRACTION      FOUR         FAMILY HISTORY  Family History   Problem Relation Age of Onset   • Thyroid disease Mother    • Cancer Mother    • Hyperlipidemia Mother    • Alcohol abuse Father    • Cancer Sister    • Diabetes Sister    • Hypertension Brother    • Diabetes Brother    • Diabetes Brother    • Heart disease Paternal Grandmother    • Aortic aneurysm Other    • Malig Hyperthermia Neg Hx          SOCIAL HISTORY  Social History     Socioeconomic History   • Marital status:    Tobacco Use   • Smoking status: Never Smoker   • Smokeless tobacco: Never Used   Substance and Sexual Activity    • Alcohol use: Yes     Alcohol/week: 2.0 standard drinks     Types: 2 Glasses of wine per week   • Drug use: No   • Sexual activity: Defer         ALLERGIES  Codeine, Other, Latex, Nickel, and Penicillins        REVIEW OF SYSTEMS  Review of Systems     All systems reviewed and negative except for those discussed in HPI.       PHYSICAL EXAM    I have reviewed the triage vital signs and nursing notes.    ED Triage Vitals [04/16/22 1423]   Temp Heart Rate Resp BP SpO2   97.9 °F (36.6 °C) 85 18 -- 94 %      Temp src Heart Rate Source Patient Position BP Location FiO2 (%)   Tympanic -- -- -- --       Physical Exam    Physical Exam   Constitutional: No distress.  Nontoxic and very pleasant.  HENT:  Head: Normocephalic and atraumatic.   Oropharynx: Mucous membranes are moist.   Eyes: No scleral icterus. No conjunctival pallor.  Neck: Painless range of motion noted. Neck supple.   Cardiovascular: Normal rate, regular rhythm and intact distal pulses.  Pulmonary/Chest: No respiratory distress.  No tachypnea or increased work of breathing noted.  .   Musculoskeletal: Moves all extremities equally.  PICC line present right upper arm.  There is an ecchymosis around the site of entry.  There is a little local contact dermatitis underlying the proximal portion of dressing with no associated induration.  No other signs of cellulitic change.  No palpable cord proximally.    Neurological: Alert.  Baseline strength and sensation noted.   Skin: Skin is pink, warm, and dry. No pallor.   Psychiatric: Mood and affect normal.   Nursing note and vitals reviewed.    LAB RESULTS  Recent Results (from the past 24 hour(s))   Duplex venous upper extremity right    Collection Time: 04/16/22  4:56 PM   Result Value Ref Range    Target HR (85%) 122 bpm    Max. Pred. HR (100%) 143 bpm    Right Internal Jugular Augment Y     Right Internal Jugular Competent Y     Right Internal Jugular Compress C     Right Internal Jugular Phasic Y     Right  Internal Jugular Spont Y     Left Internal Jugular Augment Y     Left Internal Jugular Competent Y     Left Internal Jugular Compress C     Left Internal Jugular Phasic Y     Left Internal Jugular Spont Y     Right Subclavian Augment Y     Right Subclavian Competent Y     Right Subclavian Compress C     Right Subclavian Phasic Y     Right Subclavian Spont Y     Left Subclavian Augment Y     Left Subclavian Competent Y     Left Subclavian Compress C     Left Subclavian Phasic Y     Left Subclavian Spont Y     Right Axillary Augment Y     Right Axillary Competent Y     Right Axillary Compress C     Right Axillary Phasic Y     Right Axillary Spont Y     Right Brachial Compress C     Right Radial Compress C     Right Ulnar Compress C     Right Basilic Upper Compress C     Right Basilic Forearm Compress C     Right Cephalic Upper Compress C     Right Cephalic Forearm Compress C        Ordered the above labs and independently reviewed the results.        RADIOLOGY  No Radiology Exams Resulted Within Past 24 Hours    I ordered the above noted radiological studies. Reviewed by me and discussed with radiologist.  See dictation for official radiology interpretation.      PROCEDURES    Procedures    PICC line removal    Location: Right upper extremity  Type: PICC line  Removed by me.  Following removal, I have reexamined the extremity and noted no significant neurovascular change, excessive bleeding or other abnormal findings.  A lightly compressive dressing was applied.      MEDICATIONS GIVEN IN ER    Medications - No data to display      PROGRESS, DATA ANALYSIS, CONSULTS, AND MEDICAL DECISION MAKING    My differential diagnosis of the upper extremity pain or injury includes but is not limited to contusions of the shoulder, forearm, arm, wrist, elbow or hand, dislocations of shoulder, elbow, wrist, digits, nursemaid's elbow (age-appropriate), shoulder sprain, elbow sprain, wrist sprain, digit sprain, shoulder strain, arm  strain, forearm strain, elbow strain, wrist strain, hand sprain, digit strain, lacerations of the upper extremity, fractures both closed and open of clavicle, scapula, humerus, radius, ulna, bones of the wrist, hands and digits, cellulitis or abscess, cervical radiculopathy, radial nerve palsy, neurogenic upper extremity pain, angina equivalent, SVT, DVT, arterial occlusion, compartment syndrome.      All labs have been independently reviewed by me.  All radiology studies have been reviewed by me and discussed with radiologist dictating the report.   EKG's independently viewed and interpreted by me.  Discussion below represents my analysis of pertinent findings related to patient's condition, differential diagnosis, treatment plan and final disposition.      ED Course as of 04/16/22 1742   Sat Apr 16, 2022   1450 Patient on monitor reporting discomfort to the her right arm PICC site, resting in stretcher with no apparent distress, voice normal, nontachypneic, initial blood pressure high, will recycle, anticipate further evaluation and treatment by Dr Mackenzie [TO]   1729 Duplex right upper extremity reviewed with vascular techSusu.  No evidence of DVT. [RS]   1731 No evidence of infection or DVT.  I believe the patient is experiencing a contact dermatitis/irritation of the skin related to the adhesive dressing that is applied.  We will go ahead and discontinue the PICC line while I have her here in the emergency department and save her the trip on Monday back for the same procedure.  Patient is agreeable with this plan. [RS]      ED Course User Index  [RS] Kiko Mackenzie MD  [TO] Margarita Ferrell MD       AS OF 17:42 EDT VITALS:    BP -    HR - 85  TEMP - 97.9 °F (36.6 °C) (Tympanic)  O2 SATS - 94%        DIAGNOSIS  Final diagnoses:   Irritant contact dermatitis due to other chemical products         DISPOSITION  DISCHARGE    Patient discharged in stable condition.    Reviewed implications of results, diagnosis, meds,  responsibility to follow up, warning signs and symptoms of possible worsening, potential complications and reasons to return to ER.    Patient/Family voiced understanding of above instructions.    Discussed plan for discharge, as there is no emergent indication for admission. Patient referred to primary care provider for regular health maintenance. Pt/family is agreeable and understands need for follow up and possible repeat testing.  Pt is aware that discharge does not mean that nothing is wrong but it indicates no emergency is present that requires admission and they must continue care with follow-up as given below or physician of their choice.     FOLLOW-UP  Florinda Wesis MD  [address] [id number] [phone]    Schedule an appointment as soon as possible for a visit   As needed    Carlos Mackenzie MD  [address] [id number] [phone]    Go to   As scheduled         Medication List      No changes were made to your prescriptions during this visit.            Kiko Mackenzie MD  04/16/22 5730

## 2022-04-18 ENCOUNTER — HOSPITAL ENCOUNTER (OUTPATIENT)
Dept: INFUSION THERAPY | Facility: HOSPITAL | Age: 77
Discharge: HOME | End: 2022-04-18

## 2022-05-06 ENCOUNTER — OFFICE VISIT (OUTPATIENT)
Dept: INTERNAL MEDICINE | Facility: CLINIC | Age: 77
End: 2022-05-06

## 2022-05-06 VITALS
OXYGEN SATURATION: 98 % | TEMPERATURE: 97 F | HEART RATE: 66 BPM | HEIGHT: 62 IN | SYSTOLIC BLOOD PRESSURE: 142 MMHG | BODY MASS INDEX: 26.87 KG/M2 | WEIGHT: 146 LBS | DIASTOLIC BLOOD PRESSURE: 98 MMHG

## 2022-05-06 DIAGNOSIS — R73.03 PREDIABETES: Primary | ICD-10-CM

## 2022-05-06 DIAGNOSIS — I10 ESSENTIAL HYPERTENSION: ICD-10-CM

## 2022-05-06 DIAGNOSIS — Z91.89 AT RISK FOR HEART DISEASE: ICD-10-CM

## 2022-05-06 PROCEDURE — 99214 OFFICE O/P EST MOD 30 MIN: CPT | Performed by: STUDENT IN AN ORGANIZED HEALTH CARE EDUCATION/TRAINING PROGRAM

## 2022-05-06 NOTE — PROGRESS NOTES
"  Bobby Salinas D.O.  Internal Medicine  [facility]  [address]  [place] [address]  [phone]      Chief Complaint  Establish Care    SUBJECTIVE    History of Present Illness    Sigrid Chen is a 77 y.o. female who presents to the office today as a new patient to establish care.   Pt previously saw [facility].     Recurrent UTI: She has had several UTIs the past two years. She recently completed a course of IV antibiotic for Klebsiella that was resistant to oral medications.She has seen her ob/gyn whom has started her on Estradial cream to minimize recurrence. She also follows with [facility]. She received ertapenem infusion around 2 weeks ago.     Allergies: States she has them year round, but worse with season changes. Takes benadryl PRN (not often per her report) and takes loratadine daily.     Arthritis: mainly in her back, also getting epidurals with Dr Miranda pain management. States she has had a spinal stenosis surgery in 2016. She takes ibuprofen as needed, she uses it 2-3 times per week at most. She has started working in the pool.     HTN: takes metoprolol tartrate twice daily. She doesn't check her BP at home.     Migraine: \"can't remember the last time I even took it\" in regards to sumatriptan. States they are primarily related to weather. States she has had no migraines in at least 2 years.     Genital herpes: takes valtrex 500 mg if noticing a flare.     Ulcerative colitis: follows with Lizzette Castro M.D. with [facility] colorectal surgery. She has had continent cecostomy.     Allergies   Allergen Reactions   • Codeine Nausea And Vomiting     Sick to stomach   • Other      PT SAID SHE CAN'T TAKE ANY TIME RELEASE MEDICINE OR ANYTHING WITH A \"COATING\" ON IT, PT DOES NOT HAVE LARGE INTESTINES SO MEDICATION WILL NOT DISSOLVE   • Latex Rash   • Nickel Rash   • Penicillins Rash        Outpatient Medications Marked as Taking for the 5/6/22 " encounter (Office Visit) with Bobby Salinas DO   Medication Sig Dispense Refill   • Coenzyme Q10 (CoQ-10) 100 MG capsule Take  by mouth.     • Cranberry-Vitamin C-Vitamin E 4200-20-3 MG-MG-UNIT capsule Take 1 tablet by mouth Daily. HOLDING FOR SURGERY     • diphenhydrAMINE (BENADRYL) 25 mg capsule Take 25 mg by mouth At Night As Needed.     • estradiol (ESTRACE) 0.1 MG/GM vaginal cream      • ibuprofen (ADVIL,MOTRIN) 400 MG tablet Take 400 mg by mouth Every 6 (Six) Hours As Needed for Mild Pain . HOLD PRIOR TO SURGERY     • loratadine (CLARITIN) 10 MG tablet Take 10 mg by mouth Daily.     • metoprolol tartrate (LOPRESSOR) 25 MG tablet TAKE 1 TABLET BY MOUTH 2 (TWO) TIMES A DAY. 180 tablet 1   • multivitamin with minerals tablet tablet Take  by mouth.     • potassium chloride 10 MEQ CR tablet Take 10 mEq by mouth Daily.     • SUMAtriptan (IMITREX) 100 MG tablet Take 100 mg by mouth 1 (One) Time As Needed.     • valACYclovir (VALTREX) 1000 MG tablet Take 1,000 mg by mouth Daily.     • VITAMIN D PO Take  by mouth.     • Zinc 10 MG lozenge Dissolve  in the mouth.          Past Medical History:   Diagnosis Date   • Altered bowel elimination due to intestinal ostomy (HCC)     MARTINEZ CONTINENT INTESTINAL RESERVIOR-INTUBED W/CATH RLQ (LARGE INTESTINE REMOVED)   • Anemia    • Asthma     NO CURRENT INHALERS   • Cataracts, bilateral    • Extremity pain    • GERD (gastroesophageal reflux disease)    • Herpes simplex    • History of DVT (deep vein thrombosis)     SUPERFICIAL RLE; DID NOT REQUIRE ANTICOAGULATION.  SAW DR. VLADIMIR MELGOZA   • History of MRSA infection 1980   • History of transfusion    • History of UTI 04/06/2022    recurrent, ESBL   • Hypertension    • Low back pain    • Lumbosacral disc disease    • Migraine    • Prediabetes    • Spinal stenosis, lumbar    • Ulcerative colitis (HCC)     COLECTOMY LARGE INTESTINE     Past Surgical History:   Procedure Laterality Date   • APPENDECTOMY     • BUNIONECTOMY Bilateral   "  • BUNIONECTOMY Right 02/19/2018    Procedure: RT BUNIONECTOMY WITH DOUBLE OSTEOTOMY 2ND INTERPHALANGEAL ARTHROPLASTY ;  Surgeon: Karel Thomas MD;  [facility] [facility] [place] [facility]  Service:    • CATARACT EXTRACTION, BILATERAL     • CHOLECYSTECTOMY     • COLONOSCOPY     • CONTINENT CECOSTOMY  1980    LARGE INTESTINE REMOVED, Siobhan continent Ileostomy in 1980, pouch is internal, stoma on the outside   • LUMBAR DISCECTOMY FUSION INSTRUMENTATION N/A 12/16/2016    Procedure: MICROLAMINECTOMY L3 4  L4 5 W/ METRIX;  Surgeon: Valdez Greene DO;  Location: [facility] OR;  Service:    • ROTATOR CUFF REPAIR Right    • VASCULAR SURGERY Right     REPAIRED VEIN RIGHT LOWER EXTREMITY REPAIRED VALVE GROIN AREA   • WISDOM TOOTH EXTRACTION      FOUR     Family History   Problem Relation Age of Onset   • Thyroid disease Mother    • Lung cancer Mother         cigarette use   • Hyperlipidemia Mother    • Dementia Mother    • Alcohol abuse Father    • Suicide Attempts Father    • Diabetes Sister    • Breast cancer Sister    • Hypertension Brother    • Diabetes Brother    • Diabetes Brother    • Heart disease Paternal Grandmother    • Aortic aneurysm Other    • Malig Hyperthermia Neg Hx     reports that she has never smoked. She has never used smokeless tobacco. She reports current alcohol use of about 2.0 standard drinks of alcohol per week. She reports that she does not use drugs.    OBJECTIVE    Vital Signs:   /98   Pulse 66   Temp 97 °F (36.1 °C) (Temporal)   Ht 157.5 cm (62\")   Wt 66.2 kg (146 lb)   SpO2 98%   BMI 26.70 kg/m²     Physical Exam  Vitals reviewed.   Constitutional:       General: She is not in acute distress.     Appearance: Normal appearance. She is not ill-appearing.   HENT:      Head: Atraumatic.   Eyes:      General: No scleral icterus.  Cardiovascular:      Rate and Rhythm: Normal rate and regular rhythm.      Heart sounds: Normal heart sounds. No murmur heard.  Pulmonary:      Effort: No respiratory " distress.      Breath sounds: Normal breath sounds. No wheezing or rhonchi.   Abdominal:       Musculoskeletal:      Right lower leg: No edema.      Left lower leg: No edema.   Skin:     General: Skin is warm.      Coloration: Skin is not jaundiced.   Neurological:      Mental Status: She is alert.   Psychiatric:         Mood and Affect: Mood normal.         Behavior: Behavior normal.         Thought Content: Thought content normal.                             ASSESSMENT & PLAN     Diagnoses and all orders for this visit:    1. Prediabetes (Primary)  -A1c trends on file for this patient:   A1C Last 3 Results    HGBA1C Last 3 Results 10/25/21 4/20/22   Hemoglobin A1C 5.7 (A) 5.7 (A)   (A) Abnormal value       Comments are available for some flowsheets but are not being displayed.           -Advised pt that no single treatment exists for pre-diabetes. Having pre-diabetes and not making changes increases your risk of developing full diabetes. General measures including exercising as much as possible, keeping weight in normal range, losing weight if you are overweight/obese, decreasing fast/fatty/greasy/sugary foods as well as decreasing breads and carbohydrates is strongly encouraged. Continue to monitor with yearly A1c.    2. Essential hypertension  -currently taking metoprolol tartrate 25 mg twice daily   -BP uncontrolled in office at 142/98  -check renal function today  -Asked patient to check his BP 3-5 times weekly at random times after he has been seated for 5 minutes or longer. Goal blood pressure is less than 130 systolic and less than 80 diastolic. Bring log to next visit. May need to increase antihypertensive regimen based off home readings.     3. At risk for heart disease  The 10-year ASCVD risk score ([place] SUNNY Jr., et al., 2013) is: 30%    Values used to calculate the score:      Age: 77 years      Sex: Female      Is Non- : No      Diabetic: No      Tobacco smoker: No      Systolic  Blood Pressure: 142 mmHg      Is BP treated: Yes      HDL Cholesterol: 43 mg/dL      Total Cholesterol: 169 mg/dL  -Discussed with patient the significance of her elevated ASCVD risk and LDL cholesterol. Introduced statin based therapy. Discussed statin role in decreasing heart attack and stroke risk. Given her good functional status I do believe statin therapy is reasonable to add to lifestyle modifications but she is hesitant at this time and would like to consider. Continue to monitor.         The following social determinates of health impact the patient's medical decision making: No social determinates of health were factored in to today's visit.     Follow Up  Return in about 5 months (around 10/6/2022) for Medicare Wellness.    Patient/family had no further questions at this time and verbalized understanding of the plan discussed today.

## 2022-05-07 LAB
ALBUMIN SERPL-MCNC: 4.3 G/DL (ref 3.7–4.7)
ALBUMIN/GLOB SERPL: 1.6 {RATIO} (ref 1.2–2.2)
ALP SERPL-CCNC: 57 IU/L (ref 44–121)
ALT SERPL-CCNC: 17 IU/L (ref 0–32)
AST SERPL-CCNC: 19 IU/L (ref 0–40)
BASOPHILS # BLD AUTO: 0.1 X10E3/UL (ref 0–0.2)
BASOPHILS NFR BLD AUTO: 1 %
BILIRUB SERPL-MCNC: 0.3 MG/DL (ref 0–1.2)
BUN SERPL-MCNC: 13 MG/DL (ref 8–27)
BUN/CREAT SERPL: 15 (ref 12–28)
CALCIUM SERPL-MCNC: 10 MG/DL (ref 8.7–10.3)
CHLORIDE SERPL-SCNC: 98 MMOL/L (ref 96–106)
CO2 SERPL-SCNC: 22 MMOL/L (ref 20–29)
CREAT SERPL-MCNC: 0.85 MG/DL (ref 0.57–1)
EGFRCR SERPLBLD CKD-EPI 2021: 71 ML/MIN/1.73
EOSINOPHIL # BLD AUTO: 0.2 X10E3/UL (ref 0–0.4)
EOSINOPHIL NFR BLD AUTO: 2 %
ERYTHROCYTE [DISTWIDTH] IN BLOOD BY AUTOMATED COUNT: 12.3 % (ref 11.7–15.4)
GLOBULIN SER CALC-MCNC: 2.7 G/DL (ref 1.5–4.5)
GLUCOSE SERPL-MCNC: 96 MG/DL (ref 65–99)
HCT VFR BLD AUTO: 39.9 % (ref 34–46.6)
HGB BLD-MCNC: 13.4 G/DL (ref 11.1–15.9)
IMM GRANULOCYTES # BLD AUTO: 0 X10E3/UL (ref 0–0.1)
IMM GRANULOCYTES NFR BLD AUTO: 0 %
LYMPHOCYTES # BLD AUTO: 2.4 X10E3/UL (ref 0.7–3.1)
LYMPHOCYTES NFR BLD AUTO: 25 %
MCH RBC QN AUTO: 30.8 PG (ref 26.6–33)
MCHC RBC AUTO-ENTMCNC: 33.6 G/DL (ref 31.5–35.7)
MCV RBC AUTO: 92 FL (ref 79–97)
MONOCYTES # BLD AUTO: 0.8 X10E3/UL (ref 0.1–0.9)
MONOCYTES NFR BLD AUTO: 9 %
NEUTROPHILS # BLD AUTO: 6.1 X10E3/UL (ref 1.4–7)
NEUTROPHILS NFR BLD AUTO: 63 %
PLATELET # BLD AUTO: 284 X10E3/UL (ref 150–450)
POTASSIUM SERPL-SCNC: 4.4 MMOL/L (ref 3.5–5.2)
PROT SERPL-MCNC: 7 G/DL (ref 6–8.5)
RBC # BLD AUTO: 4.35 X10E6/UL (ref 3.77–5.28)
SODIUM SERPL-SCNC: 134 MMOL/L (ref 134–144)
WBC # BLD AUTO: 9.6 X10E3/UL (ref 3.4–10.8)

## 2022-07-18 ENCOUNTER — TELEMEDICINE (OUTPATIENT)
Dept: INTERNAL MEDICINE | Facility: CLINIC | Age: 77
End: 2022-07-18

## 2022-07-18 DIAGNOSIS — I10 ESSENTIAL HYPERTENSION: ICD-10-CM

## 2022-07-18 DIAGNOSIS — R54 ADVANCED AGE: ICD-10-CM

## 2022-07-18 DIAGNOSIS — U07.1 COVID-19 VIRUS INFECTION: Primary | ICD-10-CM

## 2022-07-18 PROCEDURE — 99214 OFFICE O/P EST MOD 30 MIN: CPT | Performed by: STUDENT IN AN ORGANIZED HEALTH CARE EDUCATION/TRAINING PROGRAM

## 2022-07-18 NOTE — PROGRESS NOTES
"  Bobby Salinas D.O.  Internal Medicine  [facility]  [address], [address]  [place] [address]  [phone]      Chief Complaint  Illness (COVID19)    SUBJECTIVE    History of Present Illness    Sigrid Chen is a 77 y.o. female who presents to the office today as an established patient that last saw me on 2022.     Patient seen via telehealth Zoom audio and video.  verified.     Pt reports that she started feeling sick on 22 with sinus drainage.  She took a home test that day and it was negative.   Was seen at urgent care on 7/15/2022 when sore throat developed and was prescribed doxycycline and prednisone and a COVID 19 test was conducted that came back positive.     Pt states \"I feel a little bit better today\", has sinus drainage and a dry cough.   Denies fever, chills, trouble breathing.     Allergies   Allergen Reactions   • Codeine Nausea And Vomiting     Sick to stomach   • Other      PT SAID SHE CAN'T TAKE ANY TIME RELEASE MEDICINE OR ANYTHING WITH A \"COATING\" ON IT, PT DOES NOT HAVE LARGE INTESTINES SO MEDICATION WILL NOT DISSOLVE   • Latex Rash   • Nickel Rash   • Penicillins Rash        Outpatient Medications Marked as Taking for the 22 encounter (Telemedicine) with Bobby Salinas, DO   Medication Sig Dispense Refill   • loratadine (CLARITIN) 10 MG tablet Take 10 mg by mouth Daily.     • metoprolol tartrate (LOPRESSOR) 25 MG tablet TAKE 1 TABLET BY MOUTH 2 (TWO) TIMES A DAY. 180 tablet 1   • multivitamin with minerals tablet tablet Take  by mouth.     • predniSONE (DELTASONE) 20 MG tablet Take 1 tablet by mouth 3 (Three) Times a Day for 5 days. 15 tablet 0   • [DISCONTINUED] doxycycline (MONODOX) 100 MG capsule Take 1 capsule by mouth 2 (Two) Times a Day for 10 days. 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  SAW DR. VLADIMIR MELGOZA   • History of MRSA infection 1980   • History of transfusion    • History of UTI 04/06/2022    recurrent, ESBL   • Hypertension    • Low back pain    • Lumbosacral disc disease    • Migraine    • Prediabetes    • Spinal stenosis, lumbar    • Ulcerative colitis (HCC)     COLECTOMY LARGE INTESTINE       OBJECTIVE    Vital Signs:   There were no vitals taken for this visit.    Physical Exam  Vitals reviewed: PHYSICAL EXAM LIMITED 2/2 TELEHEALTH.   Constitutional:       General: She is not in acute distress.     Appearance: She is not ill-appearing.   Eyes:      General: No scleral icterus.  Pulmonary:      Effort: Pulmonary effort is normal. No respiratory distress.   Skin:     Coloration: Skin is not jaundiced.   Neurological:      Mental Status: She is alert.   Psychiatric:         Mood and Affect: Mood normal.         Behavior: Behavior normal.         Thought Content: Thought content normal.                             ASSESSMENT & PLAN     Diagnoses and all orders for this visit:    1. COVID-19 virus infection (Primary)  2. Essential hypertension  3. Advanced age  -pt seen via telehealth visit for acute care visit, diagnosed with COVID 19 after testing at [facility] on 7/15/22, symptoms started the day prior  -she was prescribed doxycycline and prednisone before her COVID 19 test resulted and she has been taking these medications  -advised pt that COVID 19 in general is treated like other viral URI: cough/cold medications over the counter, tylenol and ibuprofen, ample fluid intake  -advised pt that there is an emergency approved medication (PAXLOVID) approved for COVID 19 in high risk patients to prevent  the risk of progression to severe illness requiring hospitalization. Discussed with her that the long term side effects are not known. Short term side effects that have been seen include muscle aches, diarrhea, bad taste in the mouth. She was agreeable to initiate treatment.   -her high risk factors are age, HTN  -I performed a drug interaction check and her current medications do not interact with paxlovid  -renal function is appropriate for full dose Paxlovid  -informed pt to report to ER for worsening symptoms, feeling of inability to catch breath, chest pain, worsening fever or chills  -recommended she STOP doxycycline and prednisone as these are not indicated for outpatient management of COVID 19  -she reports BP was in the 170s systolic while taking prednisone, recommended she check her BP daily and report any readings over 180 systolic or 100 diastolic to the office... BP should improve with prednisone discontinuation.         The following social determinates of health impact the patient's medical decision making: No social determinates of health were factored in to today's visit.     Follow Up  No follow-ups on file.    Patient/family had no further questions at this time and verbalized understanding of the plan discussed today.

## 2022-07-19 ENCOUNTER — TELEPHONE (OUTPATIENT)
Dept: INTERNAL MEDICINE | Facility: CLINIC | Age: 77
End: 2022-07-19

## 2022-07-19 NOTE — TELEPHONE ENCOUNTER
Spoke with patient she is having cough and nasal congestion. She is taking cough medication and antihistamine for her nasal congestion. She tested positive for COVID was given Paxlovid it caused her to have stomach cramps and diarrhea she stop medication. No shortness of breath she is not taking any antibiotic. Advise patient to go to the urgent care if she think she may have pneumonia.

## 2022-07-19 NOTE — TELEPHONE ENCOUNTER
Caller: Sigrid Chen    Relationship: Self    Best call back number: [phone]    What medications are you currently taking: PAXLOVID    When did you start taking these medications: 7/18/22    Who prescribed you this medication: DR CARIAS    What are your concerns: PATIENT STATES THAT THE MEDICATION HAS MAD HER SYMPTOMS WORSE.STATES SHE IS HAVING DRY COUGH EVREY FEW MINUTES NON STOP AND DIARRHEA.PATIENT IS CONCERNED THAT SHE MAY PNEUMONIA BECAUSE SHE HAS HAD IT BEFORE AND WOULD LIKE TO PREVENT IT FROM GETTING TO THAT AGAIN OR WORSE.PLEASE ADVISE.    How long have you had these concerns: 7/19/22    [id number] - [place], [place] - [address] [facility] - [phone] [facility] [phone]   [phone]

## 2022-10-05 ENCOUNTER — OFFICE VISIT (OUTPATIENT)
Dept: INTERNAL MEDICINE | Facility: CLINIC | Age: 77
End: 2022-10-05

## 2022-10-05 VITALS
WEIGHT: 148 LBS | TEMPERATURE: 97 F | SYSTOLIC BLOOD PRESSURE: 144 MMHG | HEIGHT: 62 IN | BODY MASS INDEX: 27.23 KG/M2 | OXYGEN SATURATION: 97 % | HEART RATE: 62 BPM | DIASTOLIC BLOOD PRESSURE: 76 MMHG

## 2022-10-05 DIAGNOSIS — I10 ESSENTIAL HYPERTENSION: ICD-10-CM

## 2022-10-05 DIAGNOSIS — R25.2 LEG CRAMPS: ICD-10-CM

## 2022-10-05 DIAGNOSIS — Z00.00 MEDICARE ANNUAL WELLNESS VISIT, SUBSEQUENT: Primary | ICD-10-CM

## 2022-10-05 DIAGNOSIS — L84 CORN OF FOOT: ICD-10-CM

## 2022-10-05 PROCEDURE — 1170F FXNL STATUS ASSESSED: CPT | Performed by: STUDENT IN AN ORGANIZED HEALTH CARE EDUCATION/TRAINING PROGRAM

## 2022-10-05 PROCEDURE — 99214 OFFICE O/P EST MOD 30 MIN: CPT | Performed by: STUDENT IN AN ORGANIZED HEALTH CARE EDUCATION/TRAINING PROGRAM

## 2022-10-05 PROCEDURE — G0439 PPPS, SUBSEQ VISIT: HCPCS | Performed by: STUDENT IN AN ORGANIZED HEALTH CARE EDUCATION/TRAINING PROGRAM

## 2022-10-05 PROCEDURE — 1159F MED LIST DOCD IN RCRD: CPT | Performed by: STUDENT IN AN ORGANIZED HEALTH CARE EDUCATION/TRAINING PROGRAM

## 2022-10-05 NOTE — PATIENT INSTRUCTIONS
Medicare Wellness  Personal Prevention Plan of Service     Date of Office Visit:    Encounter Provider:  Bobby Salinas DO  Place of Service:  [facility]  Patient Name: Sigrid Chen  :  1945    As part of the Medicare Wellness portion of your visit today, we are providing you with this personalized preventive plan of services (PPPS). This plan is based upon recommendations of the [place] Preventive Services Task Force (USPSTF) and the Advisory Committee on Immunization Practices (ACIP).    This lists the preventive care services that should be considered, and provides dates of when you are due. Items listed as completed are up-to-date and do not require any further intervention.    Health Maintenance   Topic Date Due    DXA SCAN  2016    LIPID PANEL  10/25/2022    MAMMOGRAM  2023    ANNUAL WELLNESS VISIT  10/05/2023    TDAP/TD VACCINES (2 - Tdap) 09/10/2027    HEPATITIS C SCREENING  Completed    COVID-19 Vaccine  Completed    INFLUENZA VACCINE  Completed    Pneumococcal Vaccine 65+  Completed    ZOSTER VACCINE  Completed    COLORECTAL CANCER SCREENING  Discontinued       Orders Placed This Encounter   Procedures    Basic metabolic panel     Order Specific Question:   Release to patient     Answer:   Routine Release    Magnesium     Order Specific Question:   Release to patient     Answer:   Routine Release    Phosphorus     Order Specific Question:   Release to patient     Answer:   Routine Release       Return in about 3 months (around 2023) for Recheck.

## 2022-10-05 NOTE — PROGRESS NOTES
The ABCs of the Annual Wellness Visit  Subsequent Medicare Wellness Visit    Chief Complaint   Patient presents with   • Medicare Wellness-subsequent   • leg cramps      Subjective    History of Present Illness:  Sigrid Chen is a 77 y.o. female who presents for a Subsequent Medicare Wellness Visit.    The following portions of the patient's history were reviewed and   updated as appropriate: allergies, current medications, past family history, past medical history, past social history, past surgical history and problem list.    Compared to one year ago, the patient  feels her physical   health is the same. Aside from chronic back pain.     *does not check her BP at home  *states she has leg cramps at times if she does not keep up with water intake, seems to get better after drinking a gatorade; she is worried about electrolytes like calcium being abnormal because of this   *she fears getting dementia because many people in her family had it; she still does all the things around the house that she normally would and does her own finances, drives, does not get lost.. She does have issues remembering names as quickly as she once did but she is able to remember them.     Compared to one year ago, the patient feels her mental   health is the same.    Recent Hospitalizations:  She was not admitted to the hospital during the last year.       Current Medical Providers:  Patient Care Team:  Bobby Salinas DO as PCP - General (Internal Medicine)    Outpatient Medications Prior to Visit   Medication Sig Dispense Refill   • Cranberry-Vitamin C-Vitamin E 4200-20-3 MG-MG-UNIT capsule Take 1 tablet by mouth Daily. HOLDING FOR SURGERY     • diphenhydrAMINE (BENADRYL) 25 mg capsule Take 25 mg by mouth At Night As Needed.     • ibuprofen (ADVIL,MOTRIN) 400 MG tablet Take 400 mg by mouth Every 6 (Six) Hours As Needed for Mild Pain . HOLD PRIOR TO SURGERY     • loratadine (CLARITIN) 10 MG tablet Take 10 mg by mouth Daily.     •  "metoprolol tartrate (LOPRESSOR) 25 MG tablet TAKE 1 TABLET BY MOUTH 2 (TWO) TIMES A DAY. 180 tablet 1   • multivitamin with minerals tablet tablet Take  by mouth.     • potassium chloride 10 MEQ CR tablet Take 10 mEq by mouth Daily.     • VITAMIN D PO Take  by mouth.     • Zinc 10 MG lozenge Dissolve  in the mouth.       No facility-administered medications prior to visit.       No opioid medication identified on active medication list. I have reviewed chart for other potential  high risk medication/s and harmful drug interactions in the elderly.          Aspirin is not on active medication list.  Aspirin use is not indicated based on review of current medical condition/s. Risk of harm outweighs potential benefits.  .    Patient Active Problem List   Diagnosis   • Nonfamilial nocturnal leg cramps   • Atypical migraine   • Vitamin D deficiency   • Cervicogenic headache   • Essential hypertension   • Gastroesophageal reflux disease   • Cervico-occipital neuralgia   • Congenital pes planus   • Medicare annual wellness visit, initial   • Intestinal malabsorption   • History of UTI   • Urinary tract infectious disease     Advance Care Planning  Advance Directive is on file.  ACP discussion was held with the patient during this visit. Patient has an advance directive in EMR which is still valid.           Objective    Vitals:    10/05/22 1325   BP: 144/76   Pulse: 62   Temp: 97 °F (36.1 °C)   TempSrc: Infrared   SpO2: 97%   Weight: 67.1 kg (148 lb)   Height: 157.5 cm (62\")     Estimated body mass index is 27.07 kg/m² as calculated from the following:    Height as of this encounter: 157.5 cm (62\").    Weight as of this encounter: 67.1 kg (148 lb).    BMI is >= 25 and <30. (Overweight) The following options were offered after discussion;: exercise counseling/recommendations      Does the patient have evidence of cognitive impairment? No    Physical Exam  Vitals reviewed.   Constitutional:       General: She is not in acute " distress.     Appearance: Normal appearance. She is not ill-appearing.   HENT:      Head: Atraumatic.      Right Ear: Tympanic membrane, ear canal and external ear normal. There is no impacted cerumen.      Left Ear: Tympanic membrane, ear canal and external ear normal. There is no impacted cerumen.   Eyes:      General: No scleral icterus.  Cardiovascular:      Rate and Rhythm: Normal rate and regular rhythm.      Heart sounds: Normal heart sounds. No murmur heard.  Pulmonary:      Effort: Pulmonary effort is normal. No respiratory distress.      Breath sounds: Normal breath sounds. No wheezing.   Abdominal:      General: Bowel sounds are normal. There is no distension.      Palpations: Abdomen is soft.      Tenderness: There is no abdominal tenderness.       Musculoskeletal:      Right lower leg: No edema.      Left lower leg: No edema.        Feet:    Skin:     Coloration: Skin is not jaundiced.   Neurological:      Mental Status: She is alert.   Psychiatric:         Mood and Affect: Mood normal.         Behavior: Behavior normal.         Thought Content: Thought content normal.                 HEALTH RISK ASSESSMENT    Smoking Status:  Social History     Tobacco Use   Smoking Status Never Smoker   Smokeless Tobacco Never Used     Alcohol Consumption:  Social History     Substance and Sexual Activity   Alcohol Use Yes   • Alcohol/week: 2.0 standard drinks   • Types: 2 Glasses of wine per week     Fall Risk Screen:    [facility], and patient is at LOW risk for falls.Assessment completed on:10/5/2022    Depression Screening:  PHQ-2/PHQ-9 Depression Screening 10/5/2022   Retired Total Score -   Little Interest or Pleasure in Doing Things 0-->not at all   Feeling Down, Depressed or Hopeless 0-->not at all   PHQ-9: Brief Depression Severity Measure Score 0       Health Habits and Functional and Cognitive Screening:  Functional & Cognitive Status 10/5/2022   Do you have difficulty  preparing food and eating? No   Do you have difficulty bathing yourself, getting dressed or grooming yourself? No   Do you have difficulty using the toilet? No   Do you have difficulty moving around from place to place? No   Do you have trouble with steps or getting out of a bed or a chair? No   Current Diet Well Balanced Diet   Dental Exam Up to date   Eye Exam Up to date   Exercise (times per week) 2 times per week   Current Exercises Include House Cleaning;Walking;Yard Work   Current Exercise Activities Include -   Do you need help using the phone?  No   Are you deaf or do you have serious difficulty hearing?  Yes   Do you need help with transportation? No   Do you need help shopping? No   Do you need help preparing meals?  No   Do you need help with housework?  No   Do you need help with laundry? No   Do you need help taking your medications? No   Do you need help managing money? No   Do you ever drive or ride in a car without wearing a seat belt? No   Have you felt unusual stress, anger or loneliness in the last month? No   Who do you live with? Spouse   If you need help, do you have trouble finding someone available to you? No   Do you have difficulty concentrating, remembering or making decisions? No       Age-appropriate Screening Schedule:  Refer to the list below for future screening recommendations based on patient's age, sex and/or medical conditions. Orders for these recommended tests are listed in the plan section. The patient has been provided with a written plan.    Health Maintenance   Topic Date Due   • DXA SCAN  04/02/2016   • LIPID PANEL  10/25/2022   • MAMMOGRAM  09/07/2023   • TDAP/TD VACCINES (2 - Tdap) 09/10/2027   • INFLUENZA VACCINE  Completed   • ZOSTER VACCINE  Completed              Assessment & Plan   CMS Preventative Services Quick Reference  Risk Factors Identified During Encounter  Chronic Pain : follows with pain management  Hearing Problem: wears hearing aids normally but forgot  them today    *will obtain DEXA and mammogram results from GYN provider    The above risks/problems have been discussed with the patient.  Follow up actions/plans if indicated are seen below in the Assessment/Plan Section.  Pertinent information has been shared with the patient in the After Visit Summary.    Diagnoses and all orders for this visit:    1. Essential hypertension   2. Leg cramps  -takes metoprolol tartrate 25 mg twice daily  -BP in office slightly uncontrolled at 144/76 but she has sent me home numbers before and they are mostly controlled there  -also supplements with potassium chloride 10 meq daily   -I would like for her to bring her BP machine In at next visit so that we can compare readings  -also reports leg cramps, seemingly worse when she is dehydrated... she has ostomy . Improved with gatorade.  -will check BMP, Mg, Phos. Recommend she increase gatorade from 1 daily to twice daily   -     Basic metabolic panel  -     Magnesium  -     Phosphorus    3. Corn of foot  -inside aspect of right great toe  -recommended podiatrist evaluation; provided her contact info for local podiatrist           Follow Up:   Return in about 3 months (around 1/5/2023) for Recheck.     An After Visit Summary and PPPS were made available to the patient.

## 2022-10-06 ENCOUNTER — APPOINTMENT (OUTPATIENT)
Dept: CT IMAGING | Facility: HOSPITAL | Age: 77
End: 2022-10-06

## 2022-10-06 ENCOUNTER — APPOINTMENT (OUTPATIENT)
Dept: GENERAL RADIOLOGY | Facility: HOSPITAL | Age: 77
End: 2022-10-06

## 2022-10-06 ENCOUNTER — HOSPITAL ENCOUNTER (OUTPATIENT)
Facility: HOSPITAL | Age: 77
Setting detail: OBSERVATION
Discharge: HOME | End: 2022-10-07
Attending: EMERGENCY MEDICINE | Admitting: EMERGENCY MEDICINE

## 2022-10-06 DIAGNOSIS — R07.9 CHEST PAIN, UNSPECIFIED TYPE: Primary | ICD-10-CM

## 2022-10-06 DIAGNOSIS — I10 ELEVATED BLOOD PRESSURE READING WITH DIAGNOSIS OF HYPERTENSION: ICD-10-CM

## 2022-10-06 LAB
ALBUMIN SERPL-MCNC: 4.7 G/DL (ref 3.5–5.2)
ALBUMIN/GLOB SERPL: 2.2 G/DL
ALP SERPL-CCNC: 59 U/L (ref 39–117)
ALT SERPL W P-5'-P-CCNC: 21 U/L (ref 1–33)
ANION GAP SERPL CALCULATED.3IONS-SCNC: 9.1 MMOL/L (ref 5–15)
AST SERPL-CCNC: 23 U/L (ref 1–32)
BASOPHILS # BLD AUTO: 0.09 10*3/MM3 (ref 0–0.2)
BASOPHILS NFR BLD AUTO: 1.3 % (ref 0–1.5)
BILIRUB SERPL-MCNC: 0.3 MG/DL (ref 0–1.2)
BUN SERPL-MCNC: 10 MG/DL (ref 8–23)
BUN SERPL-MCNC: 10 MG/DL (ref 8–23)
BUN/CREAT SERPL: 12 (ref 7–25)
BUN/CREAT SERPL: 12.2 (ref 7–25)
CALCIUM SERPL-MCNC: 10.1 MG/DL (ref 8.6–10.5)
CALCIUM SPEC-SCNC: 9.8 MG/DL (ref 8.6–10.5)
CHLORIDE SERPL-SCNC: 93 MMOL/L (ref 98–107)
CHLORIDE SERPL-SCNC: 98 MMOL/L (ref 98–107)
CHOLEST SERPL-MCNC: 187 MG/DL (ref 0–200)
CO2 SERPL-SCNC: 25.9 MMOL/L (ref 22–29)
CO2 SERPL-SCNC: 27.5 MMOL/L (ref 22–29)
CREAT SERPL-MCNC: 0.82 MG/DL (ref 0.57–1)
CREAT SERPL-MCNC: 0.83 MG/DL (ref 0.57–1)
D DIMER PPP FEU-MCNC: 0.6 MCGFEU/ML (ref 0–0.49)
DEPRECATED RDW RBC AUTO: 40.2 FL (ref 37–54)
EGFRCR SERPLBLD CKD-EPI 2021: 72.7 ML/MIN/1.73
EGFRCR SERPLBLD CKD-EPI 2021: 73.8 ML/MIN/1.73
EOSINOPHIL # BLD AUTO: 0.21 10*3/MM3 (ref 0–0.4)
EOSINOPHIL NFR BLD AUTO: 3.1 % (ref 0.3–6.2)
ERYTHROCYTE [DISTWIDTH] IN BLOOD BY AUTOMATED COUNT: 12.7 % (ref 12.3–15.4)
GLOBULIN UR ELPH-MCNC: 2.1 GM/DL
GLUCOSE SERPL-MCNC: 127 MG/DL (ref 65–99)
GLUCOSE SERPL-MCNC: 90 MG/DL (ref 65–99)
HCT VFR BLD AUTO: 38.4 % (ref 34–46.6)
HDLC SERPL-MCNC: 63 MG/DL (ref 40–60)
HGB BLD-MCNC: 13.3 G/DL (ref 12–15.9)
HOLD SPECIMEN: NORMAL
HOLD SPECIMEN: NORMAL
IMM GRANULOCYTES # BLD AUTO: 0.03 10*3/MM3 (ref 0–0.05)
IMM GRANULOCYTES NFR BLD AUTO: 0.4 % (ref 0–0.5)
LDLC SERPL CALC-MCNC: 105 MG/DL (ref 0–100)
LDLC/HDLC SERPL: 1.63 {RATIO}
LIPASE SERPL-CCNC: 58 U/L (ref 13–60)
LYMPHOCYTES # BLD AUTO: 2.5 10*3/MM3 (ref 0.7–3.1)
LYMPHOCYTES NFR BLD AUTO: 36.9 % (ref 19.6–45.3)
MAGNESIUM SERPL-MCNC: 2.2 MG/DL (ref 1.6–2.4)
MCH RBC QN AUTO: 30.2 PG (ref 26.6–33)
MCHC RBC AUTO-ENTMCNC: 34.6 G/DL (ref 31.5–35.7)
MCV RBC AUTO: 87.3 FL (ref 79–97)
MONOCYTES # BLD AUTO: 0.51 10*3/MM3 (ref 0.1–0.9)
MONOCYTES NFR BLD AUTO: 7.5 % (ref 5–12)
NEUTROPHILS NFR BLD AUTO: 3.43 10*3/MM3 (ref 1.7–7)
NEUTROPHILS NFR BLD AUTO: 50.8 % (ref 42.7–76)
NRBC BLD AUTO-RTO: 0 /100 WBC (ref 0–0.2)
PHOSPHATE SERPL-MCNC: 4.5 MG/DL (ref 2.5–4.5)
PLATELET # BLD AUTO: 253 10*3/MM3 (ref 140–450)
PMV BLD AUTO: 10.4 FL (ref 6–12)
POTASSIUM SERPL-SCNC: 4.2 MMOL/L (ref 3.5–5.2)
POTASSIUM SERPL-SCNC: 4.6 MMOL/L (ref 3.5–5.2)
PROT SERPL-MCNC: 6.8 G/DL (ref 6–8.5)
QT INTERVAL: 398 MS
RBC # BLD AUTO: 4.4 10*6/MM3 (ref 3.77–5.28)
SODIUM SERPL-SCNC: 128 MMOL/L (ref 136–145)
SODIUM SERPL-SCNC: 134 MMOL/L (ref 136–145)
TRIGL SERPL-MCNC: 106 MG/DL (ref 0–150)
TROPONIN T SERPL-MCNC: <0.01 NG/ML (ref 0–0.03)
TROPONIN T SERPL-MCNC: <0.01 NG/ML (ref 0–0.03)
VLDLC SERPL-MCNC: 19 MG/DL (ref 5–40)
WBC NRBC COR # BLD: 6.77 10*3/MM3 (ref 3.4–10.8)
WHOLE BLOOD HOLD COAG: NORMAL
WHOLE BLOOD HOLD SPECIMEN: NORMAL

## 2022-10-06 PROCEDURE — 80053 COMPREHEN METABOLIC PANEL: CPT

## 2022-10-06 PROCEDURE — 83690 ASSAY OF LIPASE: CPT | Performed by: EMERGENCY MEDICINE

## 2022-10-06 PROCEDURE — 99284 EMERGENCY DEPT VISIT MOD MDM: CPT

## 2022-10-06 PROCEDURE — 85025 COMPLETE CBC W/AUTO DIFF WBC: CPT

## 2022-10-06 PROCEDURE — 93010 ELECTROCARDIOGRAM REPORT: CPT | Performed by: INTERNAL MEDICINE

## 2022-10-06 PROCEDURE — 85379 FIBRIN DEGRADATION QUANT: CPT | Performed by: EMERGENCY MEDICINE

## 2022-10-06 PROCEDURE — G0378 HOSPITAL OBSERVATION PER HR: HCPCS

## 2022-10-06 PROCEDURE — 84484 ASSAY OF TROPONIN QUANT: CPT

## 2022-10-06 PROCEDURE — 71046 X-RAY EXAM CHEST 2 VIEWS: CPT

## 2022-10-06 PROCEDURE — 80061 LIPID PANEL: CPT | Performed by: EMERGENCY MEDICINE

## 2022-10-06 PROCEDURE — 84484 ASSAY OF TROPONIN QUANT: CPT | Performed by: EMERGENCY MEDICINE

## 2022-10-06 PROCEDURE — 93005 ELECTROCARDIOGRAM TRACING: CPT | Performed by: EMERGENCY MEDICINE

## 2022-10-06 PROCEDURE — 71275 CT ANGIOGRAPHY CHEST: CPT

## 2022-10-06 PROCEDURE — 36415 COLL VENOUS BLD VENIPUNCTURE: CPT

## 2022-10-06 PROCEDURE — 0 IOPAMIDOL PER 1 ML: Performed by: EMERGENCY MEDICINE

## 2022-10-06 PROCEDURE — 93005 ELECTROCARDIOGRAM TRACING: CPT

## 2022-10-06 RX ORDER — CHOLECALCIFEROL (VITAMIN D3) 125 MCG
5 CAPSULE ORAL NIGHTLY PRN
Status: DISCONTINUED | OUTPATIENT
Start: 2022-10-06 | End: 2022-10-07 | Stop reason: HOSPADM

## 2022-10-06 RX ORDER — POTASSIUM CHLORIDE 750 MG/1
10 TABLET, FILM COATED, EXTENDED RELEASE ORAL DAILY
Status: DISCONTINUED | OUTPATIENT
Start: 2022-10-06 | End: 2022-10-07 | Stop reason: HOSPADM

## 2022-10-06 RX ORDER — SODIUM CHLORIDE 9 MG/ML
75 INJECTION, SOLUTION INTRAVENOUS CONTINUOUS
Status: DISCONTINUED | OUTPATIENT
Start: 2022-10-06 | End: 2022-10-07 | Stop reason: HOSPADM

## 2022-10-06 RX ORDER — AMLODIPINE BESYLATE 5 MG/1
5 TABLET ORAL
Status: DISCONTINUED | OUTPATIENT
Start: 2022-10-06 | End: 2022-10-07

## 2022-10-06 RX ORDER — ASPIRIN 325 MG
325 TABLET ORAL ONCE
Status: COMPLETED | OUTPATIENT
Start: 2022-10-06 | End: 2022-10-06

## 2022-10-06 RX ORDER — DIPHENHYDRAMINE HCL 25 MG
25 CAPSULE ORAL NIGHTLY PRN
Status: DISCONTINUED | OUTPATIENT
Start: 2022-10-06 | End: 2022-10-07 | Stop reason: HOSPADM

## 2022-10-06 RX ORDER — SODIUM CHLORIDE 0.9 % (FLUSH) 0.9 %
10 SYRINGE (ML) INJECTION AS NEEDED
Status: DISCONTINUED | OUTPATIENT
Start: 2022-10-06 | End: 2022-10-07 | Stop reason: HOSPADM

## 2022-10-06 RX ADMIN — SODIUM CHLORIDE 75 ML/HR: 9 INJECTION, SOLUTION INTRAVENOUS at 21:04

## 2022-10-06 RX ADMIN — ASPIRIN 325 MG: 325 TABLET ORAL at 17:22

## 2022-10-06 RX ADMIN — Medication 10 ML: at 20:45

## 2022-10-06 RX ADMIN — Medication 5 MG: at 20:59

## 2022-10-06 RX ADMIN — POTASSIUM CHLORIDE 10 MEQ: 750 TABLET, EXTENDED RELEASE ORAL at 20:44

## 2022-10-06 RX ADMIN — AMLODIPINE BESYLATE 5 MG: 5 TABLET ORAL at 20:44

## 2022-10-06 RX ADMIN — IOPAMIDOL 100 ML: 755 INJECTION, SOLUTION INTRAVENOUS at 20:04

## 2022-10-06 NOTE — ED TRIAGE NOTES
Chief Complaint   Patient presents with   • Chest Pain   • Hypertension     Patient presents with chest pain and reports of HTN. Denies cardiac history. AOx4 and ambulatory in triage.     Patient was placed in face mask during first look triage.  Patient was wearing a face mask throughout encounter.  I wore personal protective equipment throughout the encounter.  Hand hygiene was performed before and after patient encounter.

## 2022-10-06 NOTE — PROGRESS NOTES
MD ATTESTATION NOTE    The SISI and I have discussed this patient's history, physical exam, and treatment plan.  I have reviewed the documentation and personally had a face to face interaction with the patient. I affirm the documentation and agree with the treatment and plan.  The attached note describes my personal findings.      I provided a substantive portion of the care of the patient.  I personally performed the physical exam in its entirety, and below are my findings.  For this patient encounter, the patient wore surgical mask, I wore full protective PPE including N95 and eye protection    Brief HPI: This is a 77-year-old female with history of hypertension who presents with an episode of chest pain that started today at about 1:30 PM.  She was sitting still described as a chest tightness and a pressure and a heaviness across the center of her chest.  Lasted about 5 to 10 minutes and then gradually resolved.  She did have some mild nausea and mild shortness of breath with it.  She is reported no exertional chest pain recently she reports no fevers or chills.  She states a few weeks ago she had COVID and is recovered from that.  She denies any headache, focal weakness to arms or legs.    General : Anxious elderly female patient is awake alert and oriented.  Patient is not septic or toxic appearing O2 sats 100% on room air.  Blood pressure is mildly elevated.  Normal heart rate  HEENT: NCAT  CV: Heart is regular with no murmurs.  Normal inspection of the chest.  Pain is not reproducible with palpation  Respiratory: CTA bilaterally.  No respiratory distress  Abd: Soft and nontender  Ext: No acute abnormalities.  Intact distal pulses to upper extremities bilaterally.  No motor or sensory changes.  Skin: No rash  Neuro: Cranial nerves II through XII grossly intact as tested.  No acute lateralizing deficits.  No motor or sensory changes  Psych: Normal mood and affect    I have reviewed the patient's vital signs, lab  work, EKG and imaging.  EKG that was done at 1503 has been reviewed.  Rate of 67 it is normal sinus rhythm there is mild intraventricular duction delay with normal axis  Not appreciate any acute injury pattern there are some nonspecific T wave changes in the inferior leads QT looks normal  When I compared to an EKG that was done on September 29, 2021 I do not see any changes.    I reviewed old records and the patient in March 13, 2019 had a negative treadmill stress test at [facility].  When I look at old records she had a positive COVID test on 7/15/2022.  Plan:  #1)  Chest pain: Pain lasted 5 to 10 minutes she is 77 years of age history of hypertension and her blood pressures been a little elevated here very well could be a contributing factor.  Her initial troponins are negative and we will continue to trend the troponins.  We will consult cardiology.  There is no change on her EKG.  Given her recent COVID infection I Carina do a CT angiogram of her chest to make sure there is no pulmonary embolism.  Her dimer is age-adjusted negative but is mildly elevated.    She is due for her evening dose of her hypertensive medicines.  We will continue to watch her blood pressure and treat accordingly.  We will try Norvasc as she will very likely get a stress test tomorrow and cannot be on a p.o. beta-blocker.  2.)  Hyponatremia: Patient states that if she does not drink enough fluid he does not have Gatorade because of her previous colon surgery and resection she will get a low sodium.  She usually drinks 80 ounces of fluid a day as well as Gatorade.  She is only had approximately 30 ounces of water today.  We are can give her some Gatorade p.o. as well as water per her request and we will continue to trend her electrolytes and sodium specifically.    I spoke with the patient about the treatment plan.  Currently she is asymptomatic without any pain, shortness of breath, fever, chills.  She is a little hypertensive.  Cardiology  will be consulted.  All questions answered at this time.        Note Disclaimer: At [facility], we believe that sharing information builds trust and better relationships. You are receiving this note because you recently visited [facility]. It is possible you will see health information before a provider has talked with you about it. This kind of information can be easy to misunderstand. To help you fully understand what it means for your health, we urge you to discuss this note with your provider.

## 2022-10-06 NOTE — PLAN OF CARE
Goal Outcome Evaluation:      Pt admitted for chest pain. Cards consulted. NPO at midnight. Pt denies any pain at this time. VSS

## 2022-10-06 NOTE — ED NOTES
"Nursing report ED to floor  Sigrid Chen  77 y.o.  female    HPI :   Chief Complaint   Patient presents with   • Chest Pain   • Hypertension       Admitting doctor:   Alexandro Tejeda MD    Admitting diagnosis:   The primary encounter diagnosis was Chest pain, unspecified type. A diagnosis of Elevated blood pressure reading with diagnosis of hypertension was also pertinent to this visit.    Code status:   Current Code Status     Date Active Code Status Order ID Comments User Context       Prior    Advance Care Planning Activity          Allergies:   Codeine, Other, Latex, Nickel, and Penicillins    Intake and Output  No intake or output data in the 24 hours ending 10/06/22 1724    Weight:       10/06/22  1723   Weight: 65 kg (143 lb 4.8 oz)       Most recent vitals:   Vitals:    10/06/22 1500 10/06/22 1723   BP: (!) 180/122    BP Location: Right arm    Patient Position: Standing    Pulse: 73    Resp: 18    Temp: 96.7 °F (35.9 °C)    TempSrc: Tympanic    SpO2: 97%    Weight:  65 kg (143 lb 4.8 oz)   Height:  157.5 cm (62\")       Active LDAs/IV Access:   Lines, Drains & Airways     Active LDAs     None                Labs (abnormal labs have a star):   Labs Reviewed   COMPREHENSIVE METABOLIC PANEL - Abnormal; Notable for the following components:       Result Value    Glucose 127 (*)     Sodium 128 (*)     Chloride 93 (*)     All other components within normal limits    Narrative:     GFR Normal >60  Chronic Kidney Disease <60  Kidney Failure <15     TROPONIN (IN-HOUSE) - Normal    Narrative:     Troponin T Reference Range:  <= 0.03 ng/mL-   Negative for AMI  >0.03 ng/mL-     Abnormal for myocardial necrosis.  Clinicians would have to utilize clinical acumen, EKG, Troponin and serial changes to determine if it is an Acute Myocardial Infarction or myocardial injury due to an underlying chronic condition.       Results may be falsely decreased if patient taking Biotin.     CBC WITH AUTO DIFFERENTIAL - Normal   RAINBOW " DRAW    Narrative:     The following orders were created for panel order [place] Draw.  Procedure                               Abnormality         Status                     ---------                               -----------         ------                     Green Top (Gel)[[id number]]                                  Final result               Lavender Top[[id number]]                                     Final result               Gold Top - SST[[id number]]                                   Final result               Light Blue Top[[id number]]                                   Final result                 Please view results for these tests on the individual orders.   TROPONIN (IN-HOUSE)   D-DIMER, QUANTITATIVE   LIPASE   LIPID PANEL   TROPONIN (IN-HOUSE)   CBC AND DIFFERENTIAL    Narrative:     The following orders were created for panel order CBC & Differential.  Procedure                               Abnormality         Status                     ---------                               -----------         ------                     CBC Auto Differential[[id number]]        Normal              Final result                 Please view results for these tests on the individual orders.   GREEN TOP   LAVENDER TOP   GOLD TOP - SST   LIGHT BLUE TOP       EKG:   ECG 12 Lead   Preliminary Result   HEART RATE= 67  bpm   RR Interval= 896  ms   WV Interval= 197  ms   P Horizontal Axis= -1  deg   P Front Axis= 51  deg   QRSD Interval= 104  ms   QT Interval= 398  ms   QRS Axis= 7  deg   T Wave Axis= -1  deg   - BORDERLINE ECG -   Sinus rhythm   Low voltage, precordial leads   Borderline T abnormalities, inferior leads   Electronically Signed By:    Date and Time of Study: 2022-10-06 15:03:22      ECG 12 Lead    (Results Pending)   ECG 12 Lead    (Results Pending)       Meds given in ED:   Medications   sodium chloride 0.9 % flush 10 mL (has no administration in time range)   aspirin tablet 325 mg (325 mg Oral Given 10/6/22 2406)        Imaging results:  XR Chest 2 View    Result Date: 10/6/2022  No focal pulmonary consolidation. Tortuous aorta. Follow-up as clinically indicated.  This report was finalized on 10/6/2022 3:21 PM by Dr. Karel Khan M.D.        Ambulatory status:   -Independent    Social issues:   Social History     Socioeconomic History   • Marital status:    Tobacco Use   • Smoking status: Never Smoker   • Smokeless tobacco: Never Used   Substance and Sexual Activity   • Alcohol use: Yes     Alcohol/week: 2.0 standard drinks     Types: 2 Glasses of wine per week   • Drug use: Never       NIH Stroke Scale:   0     Nursing report ED to floor:

## 2022-10-06 NOTE — ED PROVIDER NOTES
EMERGENCY DEPARTMENT ENCOUNTER    Room Number:  06/06  Date seen:  10/6/2022  Time seen: 16:45 EDT  PCP: Bobby Salinas DO  Historian: patient      HPI:  Chief Complaint: Chest pain    A complete HPI/ROS/PMH/PSH/SH/FH are unobtainable due to: None    Context: Sigrid Chen is a 77 y.o. female with a history of hypertension who presents to the ED for evaluation of an episode of chest pain that occurred acutely at 1:30 PM while sitting.  She states she suddenly felt a central chest tightness and pressure that was constant for 5 to 7 minutes and radiated to her right upper back.  It was associated with shortness of breath, nausea and lightheadedness, no vomiting or syncope.  After 5 to 7 minutes it gradually resolved.  No history of this.  No history of hyperlipidemia diabetes smoking or cardiac disease.  States she had a stress test in the last 1 year that was normal.  She is asymptomatic currently.  Her blood pressure has been elevated recently, PCP is monitoring it and when she saw them yesterday about it they plan to monitor for 3 more weeks and then make adjustments if needed.        PAST MEDICAL HISTORY  Active Ambulatory Problems     Diagnosis Date Noted   • Nonfamilial nocturnal leg cramps 04/18/2016   • Atypical migraine 09/26/2013   • Vitamin D deficiency 11/25/2014   • Cervicogenic headache 08/05/2014   • Essential hypertension 11/25/2014   • Gastroesophageal reflux disease 01/03/2014   • Cervico-occipital neuralgia 08/05/2014   • Congenital pes planus 01/15/2015   • Medicare annual wellness visit, initial 10/05/2017   • Intestinal malabsorption 10/05/2017   • History of UTI 04/06/2022   • Urinary tract infectious disease 04/06/2022     Resolved Ambulatory Problems     Diagnosis Date Noted   • Acute bronchitis 03/18/2016   • Lumbar spinal stenosis 12/16/2016     Past Medical History:   Diagnosis Date   • Altered bowel elimination due to intestinal ostomy (HCC)    • Anemia    • Asthma    • Cataracts,  bilateral    • Extremity pain    • GERD (gastroesophageal reflux disease)    • Herpes simplex    • History of DVT (deep vein thrombosis)    • History of MRSA infection 1980   • History of transfusion    • Hypertension    • Low back pain    • Lumbosacral disc disease    • Migraine    • Prediabetes    • Spinal stenosis, lumbar    • Ulcerative colitis (HCC)          PAST SURGICAL HISTORY  Past Surgical History:   Procedure Laterality Date   • APPENDECTOMY     • BUNIONECTOMY Bilateral    • BUNIONECTOMY Right 02/19/2018    Procedure: RT BUNIONECTOMY WITH DOUBLE OSTEOTOMY 2ND INTERPHALANGEAL ARTHROPLASTY ;  Surgeon: Karel Thomas MD;  Location: [facility];  Service:    • CATARACT EXTRACTION, BILATERAL     • CHOLECYSTECTOMY     • COLONOSCOPY     • CONTINENT CECOSTOMY  1980    LARGE INTESTINE REMOVED, Siobhan continent Ileostomy in 1980, pouch is internal, stoma on the outside   • LUMBAR DISCECTOMY FUSION INSTRUMENTATION N/A 12/16/2016    Procedure: MICROLAMINECTOMY L3 4  L4 5 W/ METRIX;  Surgeon: Valdez Greene DO;  Location: [facility];  Service:    • ROTATOR CUFF REPAIR Right    • VASCULAR SURGERY Right     REPAIRED VEIN RIGHT LOWER EXTREMITY REPAIRED VALVE GROIN AREA   • VENOUS CLOSURE Right     VenaSeal Procedure   • WISDOM TOOTH EXTRACTION      FOUR         FAMILY HISTORY  Family History   Problem Relation Age of Onset   • Thyroid disease Mother    • Lung cancer Mother         cigarette use   • Hyperlipidemia Mother    • Dementia Mother    • Alcohol abuse Father    • Suicide Attempts Father    • Diabetes Sister    • Breast cancer Sister    • Hypertension Brother    • Diabetes Brother    • Diabetes Brother    • Heart disease Paternal Grandmother    • Aortic aneurysm Other    • Malig Hyperthermia Neg Hx          SOCIAL HISTORY  Social History     Socioeconomic History   • Marital status:    Tobacco Use   • Smoking status: Never Smoker   • Smokeless tobacco: Never Used   Substance and Sexual Activity   •  Alcohol use: Yes     Alcohol/week: 2.0 standard drinks     Types: 2 Glasses of wine per week   • Drug use: Never         ALLERGIES  Codeine, Other, Latex, Nickel, and Penicillins        REVIEW OF SYSTEMS  Review of Systems     All systems reviewed and negative except for those discussed in HPI.       PHYSICAL EXAM  ED Triage Vitals [10/06/22 1500]   Temp Heart Rate Resp BP SpO2   96.7 °F (35.9 °C) 73 18 (!) 180/122 97 %      Temp src Heart Rate Source Patient Position BP Location FiO2 (%)   Tympanic Monitor Standing Right arm --         GENERAL: not distressed  HENT: atraumatic  EYES: no scleral icterus  CV: regular rhythm, regular rate  RESPIRATORY: normal effort CTA B  ABDOMEN: soft, nontender nondistended normal bowel sounds no guarding or rigidity  MUSCULOSKELETAL: no deformity  NEURO: alert, moves all extremities, follows commands  SKIN: warm, dry    Vital signs and nursing notes reviewed.          LAB RESULTS  Recent Results (from the past 24 hour(s))   ECG 12 Lead    Collection Time: 10/06/22  3:03 PM   Result Value Ref Range    QT Interval 398 ms   Comprehensive Metabolic Panel    Collection Time: 10/06/22  3:19 PM    Specimen: Blood   Result Value Ref Range    Glucose 127 (H) 65 - 99 mg/dL    BUN 10 8 - 23 mg/dL    Creatinine 0.83 0.57 - 1.00 mg/dL    Sodium 128 (L) 136 - 145 mmol/L    Potassium 4.2 3.5 - 5.2 mmol/L    Chloride 93 (L) 98 - 107 mmol/L    CO2 25.9 22.0 - 29.0 mmol/L    Calcium 9.8 8.6 - 10.5 mg/dL    Total Protein 6.8 6.0 - 8.5 g/dL    Albumin 4.70 3.50 - 5.20 g/dL    ALT (SGPT) 21 1 - 33 U/L    AST (SGOT) 23 1 - 32 U/L    Alkaline Phosphatase 59 39 - 117 U/L    Total Bilirubin 0.3 0.0 - 1.2 mg/dL    Globulin 2.1 gm/dL    A/G Ratio 2.2 g/dL    BUN/Creatinine Ratio 12.0 7.0 - 25.0    Anion Gap 9.1 5.0 - 15.0 mmol/L    eGFR 72.7 >60.0 mL/min/1.73   Troponin    Collection Time: 10/06/22  3:19 PM    Specimen: Blood   Result Value Ref Range    Troponin T <0.010 0.000 - 0.030 ng/mL   Green Top  (Gel)    Collection Time: 10/06/22  3:19 PM   Result Value Ref Range    Extra Tube Hold for add-ons.    Lavender Top    Collection Time: 10/06/22  3:19 PM   Result Value Ref Range    Extra Tube hold for add-on    Gold Top - SST    Collection Time: 10/06/22  3:19 PM   Result Value Ref Range    Extra Tube Hold for add-ons.    Light Blue Top    Collection Time: 10/06/22  3:19 PM   Result Value Ref Range    Extra Tube Hold for add-ons.    CBC Auto Differential    Collection Time: 10/06/22  3:19 PM    Specimen: Blood   Result Value Ref Range    WBC 6.77 3.40 - 10.80 10*3/mm3    RBC 4.40 3.77 - 5.28 10*6/mm3    Hemoglobin 13.3 12.0 - 15.9 g/dL    Hematocrit 38.4 34.0 - 46.6 %    MCV 87.3 79.0 - 97.0 fL    MCH 30.2 26.6 - 33.0 pg    MCHC 34.6 31.5 - 35.7 g/dL    RDW 12.7 12.3 - 15.4 %    RDW-SD 40.2 37.0 - 54.0 fl    MPV 10.4 6.0 - 12.0 fL    Platelets 253 140 - 450 10*3/mm3    Neutrophil % 50.8 42.7 - 76.0 %    Lymphocyte % 36.9 19.6 - 45.3 %    Monocyte % 7.5 5.0 - 12.0 %    Eosinophil % 3.1 0.3 - 6.2 %    Basophil % 1.3 0.0 - 1.5 %    Immature Grans % 0.4 0.0 - 0.5 %    Neutrophils, Absolute 3.43 1.70 - 7.00 10*3/mm3    Lymphocytes, Absolute 2.50 0.70 - 3.10 10*3/mm3    Monocytes, Absolute 0.51 0.10 - 0.90 10*3/mm3    Eosinophils, Absolute 0.21 0.00 - 0.40 10*3/mm3    Basophils, Absolute 0.09 0.00 - 0.20 10*3/mm3    Immature Grans, Absolute 0.03 0.00 - 0.05 10*3/mm3    nRBC 0.0 0.0 - 0.2 /100 WBC       Ordered the above labs and independently reviewed the results.        RADIOLOGY  XR Chest 2 View   Final Result   No focal pulmonary consolidation. Tortuous aorta. Follow-up   as clinically indicated.       This report was finalized on 10/6/2022 3:21 PM by Dr. Karel Khan M.D.              I ordered the above noted radiological studies. Reviewed by me and discussed with radiologist.  See dictation for official radiology interpretation.    PROCEDURES  Procedures        MEDICATIONS GIVEN IN ER  Medications   sodium  chloride 0.9 % flush 10 mL (has no administration in time range)   aspirin tablet 325 mg (has no administration in time range)             PROGRESS AND CONSULTS    DDx includes but is not limited to acute coronary syndrome, pulmonary embolism, thoracic aortic dissection, pneumonia, pneumothorax, musculoskeletal pain, GERD or esophageal spasm, anxiety, myocarditis/pericarditis, esophageal rupture, pancreatitis.     History: 2  EC  Age: 2  Risk factors: 1    HEART score: 5        ED Course as of 10/06/22 2312   Thu Oct 06, 2022   1643 WBC: 6.77 [KA]   1643 Hemoglobin: 13.3 [KA]   1643 Glucose(!): 127 [KA]   1643 Creatinine: 0.83 [KA]   1643 Sodium(!): 128 [KA]   1643 Troponin T: <0.010 [KA]   1643 My interpretation of the chest x-ray is no cardiomegaly or acute infiltrate [KA]   1716 I reviewed viewed all the patient's labs imaging and EKG with her.  I have recommended admission for further evaluation of her chest pain.  She is agreeable with the plan. [KA]   1717 I discussed the patient with FAHEEM Javier for ED observation unit who agrees to admit for further evaluation and treatment on behalf of Dr. Tejeda. [KA]      ED Course User Index  [KA] Jeniffer Keys PA             Patient was placed in face mask in first look. Patient was wearing facemask each time I entered the room and throughout our encounter. I wore protective equipment throughout this patient encounter including a face mask, eye shield and gloves. Hand hygiene was performed before donning protective equipment and after removal when leaving the room.        DIAGNOSIS  Final diagnoses:   Chest pain, unspecified type   Elevated blood pressure reading with diagnosis of hypertension             Latest Documented Vital Signs:  As of 16:45 EDT  BP- (!) 180/122 HR- 73 Temp- 96.7 °F (35.9 °C) (Tympanic) O2 sat- 97%       Jeniffer Keys PA  10/06/22 6867

## 2022-10-06 NOTE — H&P
[facility]   HISTORY AND PHYSICAL    Patient Name: Sigrid Chen  : 1945  MRN: [id number]  Primary Care Physician:  Bobby Salinas DO  Date of admission: 10/6/2022    Subjective   Subjective     Chief Complaint:   Chief Complaint   Patient presents with   • Chest Pain   • Hypertension         HPI:    Sigrid Chen is a pleasant afebrile ambulatory 77 y.o.  female with past medical history of hypertension, GERD, asthma, history of DVT not on anticoagulation, low back pain, prediabetes and ulcerative colitis.    She presents emergency department [facility] today with complaint of chest pain that started at 1 PM today and lasted 5 minutes while at rest.  She was notably hypertensive at home with a BP of 200/100.  Her blood pressure in the emergency department is 180/106, endorse that she took her blood pressure medications this morning.  She states that this caused her to feel nauseous and near syncopal.  She denies any diaphoresis or radiation of pain.  She is not a smoker and denies any alcohol use.    Review of Systems   All systems were reviewed and negative except for: Chest pain, nausea, near syncope    Personal History     Past Medical History:   Diagnosis Date   • Altered bowel elimination due to intestinal ostomy (HCC)     MICHELLE CONTINENT INTESTINAL RESERVIOR-INTUBED W/CATH RLQ (LARGE INTESTINE REMOVED)   • Anemia    • Asthma     NO CURRENT INHALERS   • Cataracts, bilateral    • Extremity pain    • GERD (gastroesophageal reflux disease)    • Herpes simplex    • History of DVT (deep vein thrombosis)     SUPERFICIAL RLE; DID NOT REQUIRE ANTICOAGULATION.  SAW DR. VLADIMIR MELGOZA   • History of MRSA infection    • History of transfusion    • History of UTI 2022    recurrent, ESBL   • Hypertension    • Low back pain    • Lumbosacral disc disease    • Migraine    • Prediabetes    • Spinal stenosis, lumbar    • Ulcerative colitis (HCC)     COLECTOMY LARGE INTESTINE        Past Surgical History:   Procedure Laterality Date   • APPENDECTOMY     • BUNIONECTOMY Bilateral    • BUNIONECTOMY Right 02/19/2018    Procedure: RT BUNIONECTOMY WITH DOUBLE OSTEOTOMY 2ND INTERPHALANGEAL ARTHROPLASTY ;  Surgeon: Karel Thomas MD;  Location: [facility] OR [facility];  Service:    • CATARACT EXTRACTION, BILATERAL     • CHOLECYSTECTOMY     • COLONOSCOPY     • CONTINENT CECOSTOMY  1980    LARGE INTESTINE REMOVED, Mccann continent Ileostomy in 1980, pouch is internal, stoma on the outside   • LUMBAR DISCECTOMY FUSION INSTRUMENTATION N/A 12/16/2016    Procedure: MICROLAMINECTOMY L3 4  L4 5 W/ METRIX;  Surgeon: Valdez Greene DO;  Location: [facility] OR;  Service:    • ROTATOR CUFF REPAIR Right    • VASCULAR SURGERY Right     REPAIRED VEIN RIGHT LOWER EXTREMITY REPAIRED VALVE GROIN AREA   • VENOUS CLOSURE Right     VenaSeal Procedure   • WISDOM TOOTH EXTRACTION      FOUR       Family History: family history includes Alcohol abuse in her father; Aortic aneurysm in an other family member; Breast cancer in her sister; Dementia in her mother; Diabetes in her brother, brother, and sister; Heart disease in her paternal grandmother; Hyperlipidemia in her mother; Hypertension in her brother; Lung cancer in her mother; Suicide Attempts in her father; Thyroid disease in her mother. Otherwise pertinent FHx was reviewed and not pertinent to current issue.    Social History:  reports that she has never smoked. She has never used smokeless tobacco. She reports current alcohol use of about 2.0 standard drinks of alcohol per week. She reports that she does not use drugs.    Home Medications:  Cranberry-Vitamin C-Vitamin E, Vitamin D, Zinc, diphenhydrAMINE, ibuprofen, loratadine, metoprolol tartrate, multivitamin with minerals, and potassium chloride    Allergies:  Allergies   Allergen Reactions   • Codeine Nausea And Vomiting     Sick to stomach   • Other      PT SAID SHE CAN'T TAKE ANY TIME RELEASE MEDICINE OR ANYTHING  "WITH A \"COATING\" ON IT, PT DOES NOT HAVE LARGE INTESTINES SO MEDICATION WILL NOT DISSOLVE   • Latex Rash   • Nickel Rash   • Penicillins Rash       Objective   Objective     Vitals:   Temp:  [96.7 °F (35.9 °C)] 96.7 °F (35.9 °C)  Heart Rate:  [73] 73  Resp:  [18] 18  BP: (180)/(122) 180/122  Physical Exam    Constitutional: Awake, alert   Eyes: PERRLA, sclerae anicteric, no conjunctival injection   HENT: NCAT, mucous membranes moist   Neck: Supple, no thyromegaly, no lymphadenopathy, trachea midline   Respiratory: Clear to auscultation bilaterally, nonlabored respirations    Cardiovascular: RRR, no murmurs, rubs, or gallops, palpable pedal pulses bilaterally   Gastrointestinal: Positive bowel sounds, soft, nontender, nondistended   Musculoskeletal: No bilateral ankle edema, no clubbing or cyanosis to extremities   Psychiatric: Appropriate affect, cooperative   Neurologic: Oriented x 3, strength symmetric in all extremities, Cranial Nerves grossly intact to confrontation, speech clear   Skin: No rashes     Result Review    Result Review:  I have personally reviewed the results from the time of this admission to 10/6/2022 17:55 EDT and agree with these findings:  [x]  Laboratory list / accordion  []  Microbiology  [x]  Radiology  [x]  EKG/Telemetry   []  Cardiology/Vascular   []  Pathology  []  Old records  []  Other:  Most notable findings include: Sodium 128, negative troponin, D-dimer 0.60, chest x-ray clear, EKG does not have any overt signs of ischemia      Assessment & Plan   Assessment / Plan     Brief Patient Summary:  Sigrid Chen is a 77 y.o. female who is being evaluated for chest pain.    Active Hospital Problems:  Active Hospital Problems    Diagnosis    • Chest pain      Plan:   Chest pain  Consult to cardiology  Lipid panel  Negative troponin x 1, trend  Age adjusted d dimer negative  Cardiac diet, n.p.o. after midnight  Hold beta blocker for possible stress test in " AM    Hypertension  Monitor  Continue home medications    DVT prophylaxis:  Mechanical DVT prophylaxis orders are present.    CODE STATUS:    Level Of Support Discussed With: Patient  Code Status (Patient has no pulse and is not breathing): CPR (Attempt to Resuscitate)  Medical Interventions (Patient has pulse or is breathing): Full Support    Admission Status:  I believe this patient meets observation status.    Electronically signed by FAHEEM Javier, 10/06/22, 5:55 PM EDT.         I have worn appropriate PPE during this patient encounter, sanitized my hands both with entering and exiting patient's room.

## 2022-10-07 ENCOUNTER — READMISSION MANAGEMENT (OUTPATIENT)
Dept: CALL CENTER | Facility: HOSPITAL | Age: 77
End: 2022-10-07

## 2022-10-07 VITALS
HEIGHT: 61 IN | WEIGHT: 147 LBS | OXYGEN SATURATION: 99 % | TEMPERATURE: 98.2 F | SYSTOLIC BLOOD PRESSURE: 129 MMHG | HEART RATE: 66 BPM | DIASTOLIC BLOOD PRESSURE: 94 MMHG | RESPIRATION RATE: 16 BRPM | BODY MASS INDEX: 27.75 KG/M2

## 2022-10-07 LAB
ANION GAP SERPL CALCULATED.3IONS-SCNC: 8.5 MMOL/L (ref 5–15)
BUN SERPL-MCNC: 7 MG/DL (ref 8–23)
BUN/CREAT SERPL: 12.1 (ref 7–25)
CALCIUM SPEC-SCNC: 8.6 MG/DL (ref 8.6–10.5)
CHLORIDE SERPL-SCNC: 105 MMOL/L (ref 98–107)
CO2 SERPL-SCNC: 22.5 MMOL/L (ref 22–29)
CREAT SERPL-MCNC: 0.58 MG/DL (ref 0.57–1)
DEPRECATED RDW RBC AUTO: 41.8 FL (ref 37–54)
EGFRCR SERPLBLD CKD-EPI 2021: 93.3 ML/MIN/1.73
ERYTHROCYTE [DISTWIDTH] IN BLOOD BY AUTOMATED COUNT: 12.9 % (ref 12.3–15.4)
GLUCOSE SERPL-MCNC: 91 MG/DL (ref 65–99)
HCT VFR BLD AUTO: 35 % (ref 34–46.6)
HGB BLD-MCNC: 11.7 G/DL (ref 12–15.9)
MAGNESIUM SERPL-MCNC: 2 MG/DL (ref 1.6–2.4)
MCH RBC QN AUTO: 29.6 PG (ref 26.6–33)
MCHC RBC AUTO-ENTMCNC: 33.4 G/DL (ref 31.5–35.7)
MCV RBC AUTO: 88.6 FL (ref 79–97)
PLATELET # BLD AUTO: 227 10*3/MM3 (ref 140–450)
PMV BLD AUTO: 10.8 FL (ref 6–12)
POTASSIUM SERPL-SCNC: 3.7 MMOL/L (ref 3.5–5.2)
QT INTERVAL: 416 MS
RBC # BLD AUTO: 3.95 10*6/MM3 (ref 3.77–5.28)
SODIUM SERPL-SCNC: 136 MMOL/L (ref 136–145)
WBC NRBC COR # BLD: 5.39 10*3/MM3 (ref 3.4–10.8)

## 2022-10-07 PROCEDURE — 83735 ASSAY OF MAGNESIUM: CPT | Performed by: EMERGENCY MEDICINE

## 2022-10-07 PROCEDURE — G0378 HOSPITAL OBSERVATION PER HR: HCPCS

## 2022-10-07 PROCEDURE — 99203 OFFICE O/P NEW LOW 30 MIN: CPT | Performed by: INTERNAL MEDICINE

## 2022-10-07 PROCEDURE — 93010 ELECTROCARDIOGRAM REPORT: CPT | Performed by: INTERNAL MEDICINE

## 2022-10-07 PROCEDURE — 85027 COMPLETE CBC AUTOMATED: CPT | Performed by: EMERGENCY MEDICINE

## 2022-10-07 PROCEDURE — 80048 BASIC METABOLIC PNL TOTAL CA: CPT | Performed by: EMERGENCY MEDICINE

## 2022-10-07 NOTE — PLAN OF CARE
Goal Outcome Evaluation:  Plan of Care Reviewed With: patient        Progress: improving   Patient did not have any CP or SOA. NS at 75cc/hr started. Purewick applied so patient could rest. Cardiology to see this morning.

## 2022-10-07 NOTE — OUTREACH NOTE
Prep Survey    Flowsheet Row Responses   [facility] patient discharged from? [place]   Is LACE score < 7 ? Yes   Emergency Room discharge w/ pulse ox? No   Eligibility [facility]   Date of Admission 10/06/22   Date of Discharge 10/07/22   Discharge Disposition Home or Self Care   Discharge diagnosis Chest pain   Does the patient have one of the following disease processes/diagnoses(primary or secondary)? Other   Does the patient have Home health ordered? No   Is there a DME ordered? No   Prep survey completed? Yes          IVA PRESTON - Registered Nurse

## 2022-10-07 NOTE — CONSULTS
Date of Hospital Visit: 10/07/22  Encounter Provider: Ashwin Padilla MD  Place of Service: [facility] CARDIOLOGY  Patient Name: Sigrid Chen  :1945  [id number]  Referral Provider: Alexandro Tejeda MD    Chief complaint: Chest Pain     Reason for Consult:  Chest pain     History of Present Illness:    MS Chen id a 77 year old patient with a history of colitis, hypertension, and chronic back pain.     She presented to the ED yesterday afternoon with complaints of pain that lasted bout 5 minutes.  She described this as tightness and radiated into her right scapula with mild SOA. By the time she arrived in the ED she was chest pain free    On arrival her EKG showed NSR.  Labs included  and negative troponins.  Her D-dimer was elevated at 0.60 so she underwent CT of chest which was negative for PE.     She was admitted to the observation unit and she has been chest pain free overnight.     She was seen by [facility] in 2021 for dull chest discomfort, left shoulder blade and radiation into her left arm.  This occurred while she was driving her car.  She underwent stress test which showed no ischemia with normal LV function    I reviewed the above HPI and agree with it she has had 5 minutes of substernal chest pain at rest nonradiating not associated with anything no shortness of breath no other resultant symptoms never has had a before she does walk some she feels like maybe she has been a little bit more short of breath walking no PND no orthopnea no edema.  She does not have diabetes does not smoke does not have hyperlipidemia.  Does have hypertension usually is very well controlled but it was high when she was having this pain      Previous Cardiac Testing   Stress Test 1/3/2022  Summary    Normal pharmacological stress SPECT Myocardial Perfusion Imaging.    No evidence of stress induced myocardial ischemia or infarction.      Summary of LV Function     Global LV systolic function is normal .    Gated wall motion shows normal left ventricular wall motion and systolic    wall thickening in all left ventricular segments.    Gated SPECT analysis demonstrates a post stress ejection fraction of 75%.      Risk Stratification    This is a low risk study.      ECHO 2/2013  Conclusions:   The estimated ejection fraction is between 55-60%.   Abnormal left ventricular diastolic function is observed.   There is trivial to mild tricuspid regurgitation observed        Past Medical History:   Diagnosis Date   • Altered bowel elimination due to intestinal ostomy (HCC)     MARTINEZ CONTINENT INTESTINAL RESERVIOR-INTUBED W/CATH RLQ (LARGE INTESTINE REMOVED)   • Anemia    • Asthma     NO CURRENT INHALERS   • Cataracts, bilateral    • Extremity pain    • GERD (gastroesophageal reflux disease)    • Herpes simplex    • History of DVT (deep vein thrombosis)     SUPERFICIAL RLE; DID NOT REQUIRE ANTICOAGULATION.  SAW DR. VLADIMIR MELGOZA   • History of MRSA infection 1980   • History of transfusion    • History of UTI 04/06/2022    recurrent, ESBL   • Hypertension    • Low back pain    • Lumbosacral disc disease    • Migraine    • Prediabetes    • Spinal stenosis, lumbar    • Ulcerative colitis (HCC)     COLECTOMY LARGE INTESTINE       Past Surgical History:   Procedure Laterality Date   • APPENDECTOMY     • BUNIONECTOMY Bilateral    • BUNIONECTOMY Right 02/19/2018    Procedure: RT BUNIONECTOMY WITH DOUBLE OSTEOTOMY 2ND INTERPHALANGEAL ARTHROPLASTY ;  Surgeon: Karel Thomas MD;  Location: [facility] OR [facility] [place];  Service:    • CATARACT EXTRACTION, BILATERAL     • CHOLECYSTECTOMY     • COLONOSCOPY     • CONTINENT CECOSTOMY  1980    LARGE INTESTINE REMOVED, Siobhan continent Ileostomy in 1980, pouch is internal, stoma on the outside   • LUMBAR DISCECTOMY FUSION INSTRUMENTATION N/A 12/16/2016    Procedure: MICROLAMINECTOMY L3 4  L4 5 W/ METRIX;  Surgeon: Valdez Greene DO;  Location: [facility] OR;   Service:    • ROTATOR CUFF REPAIR Right    • VASCULAR SURGERY Right     REPAIRED VEIN RIGHT LOWER EXTREMITY REPAIRED VALVE GROIN AREA   • VENOUS CLOSURE Right     VenaSeal Procedure   • WISDOM TOOTH EXTRACTION      FOUR       No medications prior to admission.       Current Meds  Scheduled Meds:  Continuous Infusions:No current facility-administered medications for this encounter.    PRN Meds:.    Allergies as of 10/06/2022 - Reviewed 10/06/2022   Allergen Reaction Noted   • Codeine Nausea And Vomiting 05/15/2015   • Other  04/08/2016   • Latex Rash 07/10/2012   • Nickel Rash 12/15/2016   • Penicillins Rash 07/06/2012       Social History     Socioeconomic History   • Marital status:    Tobacco Use   • Smoking status: Never Smoker   • Smokeless tobacco: Never Used   Substance and Sexual Activity   • Alcohol use: Yes     Alcohol/week: 2.0 standard drinks     Types: 2 Glasses of wine per week   • Drug use: Never       Family History   Problem Relation Age of Onset   • Thyroid disease Mother    • Lung cancer Mother         cigarette use   • Hyperlipidemia Mother    • Dementia Mother    • Alcohol abuse Father    • Suicide Attempts Father    • Diabetes Sister    • Breast cancer Sister    • Hypertension Brother    • Diabetes Brother    • Diabetes Brother    • Heart disease Paternal Grandmother    • Aortic aneurysm Other    • Malig Hyperthermia Neg Hx        REVIEW OF SYSTEMS:   ROS was performed and is negative except as outlined in HPI     REVIEW OF SYSTEMS:   CONSTITUTIONAL: No weight loss, fever, chills, weakness or fatigue.   HEENT: Eyes: No visual loss, blurred vision, double vision or yellow sclerae. Ears, Nose, Throat: No hearing loss, sneezing, congestion, runny nose or sore throat.   SKIN: No rash or itching.     RESPIRATORY: No shortness of breath, hemoptysis, cough or sputum.   GASTROINTESTINAL: No anorexia, nausea, vomiting or diarrhea. No abdominal pain, bright red blood per rectum or  "melena.  GENITOURINARY: No burning on urination, hematuria or increased frequency.  NEUROLOGICAL: No headache, dizziness, syncope, paralysis, ataxia, numbness or tingling in the extremities. No change in bowel or bladder control.   MUSCULOSKELETAL: No muscle, back pain, joint pain or stiffness.   HEMATOLOGIC: No anemia, bleeding or bruising.   LYMPHATICS: No enlarged nodes. No history of splenectomy.   PSYCHIATRIC: No history of depression, anxiety, hallucinations.   ENDOCRINOLOGIC: No reports of sweating, cold or heat intolerance. No polyuria or polydipsia.        Objective:   Temp:  [96.7 °F (35.9 °C)-98.2 °F (36.8 °C)] 98.2 °F (36.8 °C)  Heart Rate:  [61-98] 66  Resp:  [16-19] 16  BP: (129-180)/() 129/94  Body mass index is 27.78 kg/m².  Flowsheet Rows    Flowsheet Row First Filed Value   Admission Height 157.5 cm (62\") Documented at 10/06/2022 1723   Admission Weight 65 kg (143 lb 4.8 oz) Documented at 10/06/2022 1723        Vitals:    10/07/22 0730   BP: 129/94   Pulse: 66   Resp: 16   Temp: 98.2 °F (36.8 °C)   SpO2: 99%       Head:    Normocephalic, without obvious abnormality, atraumatic   Eyes:            Lids and lashes normal, conjunctivae and sclerae normal, no   icterus, no pallor   Ears:    Ears appear intact with no abnormalities noted   Throat:   No oral lesions, dentition good   Neck:   No adenopathy, supple, trachea midline, no thyromegaly, no   carotid bruit, no JVD   Lungs:     Breath sounds are equal and clear to auscultation    Heart:    Normal S1 and S2, RRR, No M/G/R   Abdomen:    Normal bowel sounds, no masses, no organomegaly, soft, nontender,       nondistended, no guarding   Extremities:   Moves all extremities well, no edema, no cyanosis, no redness   Pulses:   Pulses palpable and equal bilaterally.    Skin:  Psychiatric:   No bleeding, bruising or rash    Awake, alert and oriented x 3, normal mood and affect             CT of chest   FINDINGS:     No pulmonary embolism. No aortic " dissection. The caliber of the  ascending aorta is measured at 3.5 cm.     The heart size is normal without pericardial effusion. A few small  subcentimeter short axis mediastinal lymph nodes are seen that are not  significant by size criteria.     The airways appear clear.     No pleural effusion or pneumothorax.     The lungs show no focal pulmonary consolidation or mass. Old  granulomatous disease is seen. Small likely atelectasis or scarring in  the lower lungs.     Upper abdominal structures show no acute findings. The gallbladder is  surgically absent.     Degenerative changes are seen in the spine. No acute fracture is  identified.           IMPRESSION:     No pulmonary embolism.    EKG this AM    EKG on arrival         I personally viewed and interpreted the patient's EKG/Telemetry data    Assessment:  Active Hospital Problems    Diagnosis  POA   • Chest pain [R07.9]  Yes      Resolved Hospital Problems   No resolved problems to display.       Plan: I feel like she is probably okay I think these are atypical symptoms when you do her risk or she is actually high risk over the next 10 years however when I review her labs her troponins are normal ECG is normal exam is normal and I reviewed her CAT scan no PE of course but no calcium in her coronaries no calcium in her peripheral blood vessels.  I think she is a lady we can probably let go and just watch and see how this plays out I would like her to come back and see Carisa in the next week if we keep having symptoms we will have to get a Cardiolite on her she cannot really walk very well on a treadmill    Ashwin Padilla MD  10/07/22  12:59 EDT.

## 2022-10-07 NOTE — CASE MANAGEMENT/SOCIAL WORK
Discharge Planning Assessment  [facility]     Patient Name: Sigrid Chen  MRN: [id number]  Today's Date: 10/7/2022    Admit Date: 10/6/2022    Plan: INtroduced self and explained role of CCP. PPE used   Discharge Needs Assessment     Row Name 10/07/22 1000       Living Environment    People in Home spouse    Current Living Arrangements home    Primary Care Provided by self    Provides Primary Care For no one    Quality of Family Relationships supportive    Able to Return to Prior Arrangements yes       Resource/Environmental Concerns    Resource/Environmental Concerns none       Transition Planning    Patient/Family Anticipates Transition to home with family    Patient/Family Anticipated Services at Transition none    Transportation Anticipated family or friend will provide       Discharge Needs Assessment    Equipment Currently Used at Home none    Concerns to be Addressed no discharge needs identified    Anticipated Changes Related to Illness none    Equipment Needed After Discharge none    Provided Post Acute Provider List? N/A    Provided Post Acute Provider Quality & Resource List? N/A               Discharge Plan     Row Name 10/07/22 1001       Plan    Plan INtroduced self and explained role of CCP. PPE used    Provided Post Acute Provider List? N/A    Provided Post Acute Provider Quality & Resource List? N/A    Plan Comments Plans to return home at d/c and family will transport. Independent w/ ADLs. No assistive devices used. Denies any d/c needs    Final Discharge Disposition Code 01 - home or self-care              Continued Care and Services - Admitted Since 10/6/2022    Coordination has not been started for this encounter.       Expected Discharge Date and Time     Expected Discharge Date Expected Discharge Time    Oct 7, 2022          Demographic Summary    No documentation.                Functional Status    No documentation.                Psychosocial    No documentation.                 Abuse/Neglect    No documentation.                Legal    No documentation.                Substance Abuse    No documentation.                Patient Forms    No documentation.                   Eneida Jung RN

## 2022-10-07 NOTE — PROGRESS NOTES
ED OBSERVATION PROGRESS/DISCHARGE SUMMARY    Date of Admission: 10/6/2022   LOS: 0 days   PCP: Bobby Salinas DO      Patient seen at: Observation unit  Subjective   No acute events overnight.  No episodes of chest pain.  Blood pressure improved  Hospital Outcome:   77-year-old female admitted to the observation unit with a complaint of chest pain.  The emergency department she was found to be hypertensive.  Serial troponins have been negative.    This morning patient has been seen and evaluated by the cardiology service, Dr. Padilla states she is cleared for discharge home we will have her follow-up in the office with FAHEEM Robles in 1 week.  Patient is agreeable to plan.    ROS:  General: no fevers, chills  Respiratory: no cough, dyspnea  Cardiovascular: no chest pain, palpitations  Abdomen: No abdominal pain, nausea, vomiting, or diarrhea  Neurologic: No focal weakness    Objective   Physical Exam:  I have reviewed the vital signs.  Temp:  [96.7 °F (35.9 °C)-98.1 °F (36.7 °C)] 98.1 °F (36.7 °C)  Heart Rate:  [61-98] 68  Resp:  [16-19] 16  BP: (141-180)/() 144/86  General Appearance:    Alert, cooperative, no distress  Head:    Normocephalic, atraumatic  Eyes:    Sclerae anicteric  Neck:   Supple, no mass  Lungs: Clear to auscultation bilaterally, respirations unlabored  Heart: Regular rate and rhythm, S1 and S2 normal, no murmur, rub or gallop  Abdomen:  Soft, non-tender, bowel sounds active, nondistended  Extremities: No clubbing, cyanosis, or edema to lower extremities  Pulses:  2+ and symmetric in distal lower extremities  Skin: No rashes   Neurologic: Oriented x3, Normal strength to extremities    Results Review:    I have reviewed the labs, radiology results and diagnostic studies.    Results from last 7 days   Lab Units 10/06/22  1519   WBC 10*3/mm3 6.77   HEMOGLOBIN g/dL 13.3   HEMATOCRIT % 38.4   PLATELETS 10*3/mm3 253     Results from last 7 days   Lab Units 10/06/22  1519 10/05/22  1407    SODIUM mmol/L 128* 134*   POTASSIUM mmol/L 4.2 4.6   CHLORIDE mmol/L 93* 98   TOTAL CO2 mmol/L  --  27.5   CO2 mmol/L 25.9  --    BUN mg/dL 10 10   CREATININE mg/dL 0.83 0.82   CALCIUM mg/dL 9.8 10.1   BILIRUBIN mg/dL 0.3  --    ALK PHOS U/L 59  --    ALT (SGPT) U/L 21  --    AST (SGOT) U/L 23  --    GLUCOSE mg/dL 127* 90     Imaging Results (Last 24 Hours)     Procedure Component Value Units Date/Time    CT Angiogram Chest [[id number]] Collected: 10/06/22 2022     Updated: 10/06/22 2032    Narrative:      CT ANGIOGRAM CHEST-     INDICATIONS: Chest pain.  Radiation dose reduction techniques were  utilized, including automated exposure control and exposure modulation  based on body size.     TECHNIQUE: CT angiography of the chest. Three-dimensional  reconstructions.     COMPARISON: 02/28/2014     FINDINGS:     No pulmonary embolism. No aortic dissection. The caliber of the  ascending aorta is measured at 3.5 cm.     The heart size is normal without pericardial effusion. A few small  subcentimeter short axis mediastinal lymph nodes are seen that are not  significant by size criteria.     The airways appear clear.     No pleural effusion or pneumothorax.     The lungs show no focal pulmonary consolidation or mass. Old  granulomatous disease is seen. Small likely atelectasis or scarring in  the lower lungs.     Upper abdominal structures show no acute findings. The gallbladder is  surgically absent.     Degenerative changes are seen in the spine. No acute fracture is  identified.             Impression:         No pulmonary embolism.     This report was finalized on 10/6/2022 8:28 PM by Dr. Karel Khan M.D.       XR Chest 2 View [[id number]] Collected: 10/06/22 1520     Updated: 10/06/22 1524    Narrative:      XR CHEST 2 VW-     HISTORY: Female who is 77 years-old,  chest pain     TECHNIQUE: Frontal and lateral views of the chest     COMPARISON: 12/15/2016     FINDINGS: The heart size is normal. Aorta is  tortuous. Pulmonary  vasculature is unremarkable. Heart, mediastinum and pulmonary  vasculature are unremarkable. No focal pulmonary consolidation, pleural  effusion, or pneumothorax. Old granulomatous disease is seen. No acute  osseous process.       Impression:      No focal pulmonary consolidation. Tortuous aorta. Follow-up  as clinically indicated.     This report was finalized on 10/6/2022 3:21 PM by Dr. Karel Khan M.D.             I have reviewed the medications.  ---------------------------------------------------------------------------------------------  Assessment & Plan   Assessment/Problem List    Chest pain      Plan:  Chest pain  Consult to cardiology, cleared for discharge home  Lipid panel-HDL cholesterol 63, LDL cholesterol 105  Serial troponins negative  Age adjusted d dimer negative     Hypertension  Monitor  Continue home medications       Disposition: Home    Follow-up after Discharge: Cardiology in 1 week    This note will serve as a discharge summary    Maine Severino, APRN 10/07/22 04:56 EDT

## 2022-10-07 NOTE — PLAN OF CARE
Goal Outcome Evaluation:         Pt d/c via private vehicle. IV removed. Belongings returned. VSS follow up instructions given. No further questions complaints at this time

## 2022-10-07 NOTE — ED PROVIDER NOTES
MD ATTESTATION NOTE    The SISI and I have discussed this patient's history, physical exam, and treatment plan.    I provided a substantive portion of the care of this patient. I personally performed the physical exam, in its entirety. The attached note describes my personal findings.      Sigrid Chen is a 77 y.o. female who presents to the ED c/o chest pain.  She had 1 episode lasted about 5 minutes.  Is occurred at 1:30 PM.  It is heavy and tight in sensation.  It radiated to her right scapular region.  She reports having associated mild nausea as well as mild dyspnea.  She is now asymptomatic.      On exam:  GENERAL: not distressed  HENT: nares patent  EYES: no scleral icterus  CV: regular rhythm, regular rate, 2+ radial pulses bilaterally  RESPIRATORY: normal effort, clear to auscultation bilaterally  ABDOMEN: soft, nontender  MUSCULOSKELETAL: no deformity, no lower extremity edema or tenderness  NEURO: alert, moves all extremities, follows commands  SKIN: warm, dry    Labs  Recent Results (from the past 24 hour(s))   ECG 12 Lead    Collection Time: 10/06/22  3:03 PM   Result Value Ref Range    QT Interval 398 ms   Comprehensive Metabolic Panel    Collection Time: 10/06/22  3:19 PM    Specimen: Blood   Result Value Ref Range    Glucose 127 (H) 65 - 99 mg/dL    BUN 10 8 - 23 mg/dL    Creatinine 0.83 0.57 - 1.00 mg/dL    Sodium 128 (L) 136 - 145 mmol/L    Potassium 4.2 3.5 - 5.2 mmol/L    Chloride 93 (L) 98 - 107 mmol/L    CO2 25.9 22.0 - 29.0 mmol/L    Calcium 9.8 8.6 - 10.5 mg/dL    Total Protein 6.8 6.0 - 8.5 g/dL    Albumin 4.70 3.50 - 5.20 g/dL    ALT (SGPT) 21 1 - 33 U/L    AST (SGOT) 23 1 - 32 U/L    Alkaline Phosphatase 59 39 - 117 U/L    Total Bilirubin 0.3 0.0 - 1.2 mg/dL    Globulin 2.1 gm/dL    A/G Ratio 2.2 g/dL    BUN/Creatinine Ratio 12.0 7.0 - 25.0    Anion Gap 9.1 5.0 - 15.0 mmol/L    eGFR 72.7 >60.0 mL/min/1.73   Troponin    Collection Time: 10/06/22  3:19 PM    Specimen: Blood   Result Value  Ref Range    Troponin T <0.010 0.000 - 0.030 ng/mL   Green Top (Gel)    Collection Time: 10/06/22  3:19 PM   Result Value Ref Range    Extra Tube Hold for add-ons.    Lavender Top    Collection Time: 10/06/22  3:19 PM   Result Value Ref Range    Extra Tube hold for add-on    Gold Top - SST    Collection Time: 10/06/22  3:19 PM   Result Value Ref Range    Extra Tube Hold for add-ons.    Light Blue Top    Collection Time: 10/06/22  3:19 PM   Result Value Ref Range    Extra Tube Hold for add-ons.    CBC Auto Differential    Collection Time: 10/06/22  3:19 PM    Specimen: Blood   Result Value Ref Range    WBC 6.77 3.40 - 10.80 10*3/mm3    RBC 4.40 3.77 - 5.28 10*6/mm3    Hemoglobin 13.3 12.0 - 15.9 g/dL    Hematocrit 38.4 34.0 - 46.6 %    MCV 87.3 79.0 - 97.0 fL    MCH 30.2 26.6 - 33.0 pg    MCHC 34.6 31.5 - 35.7 g/dL    RDW 12.7 12.3 - 15.4 %    RDW-SD 40.2 37.0 - 54.0 fl    MPV 10.4 6.0 - 12.0 fL    Platelets 253 140 - 450 10*3/mm3    Neutrophil % 50.8 42.7 - 76.0 %    Lymphocyte % 36.9 19.6 - 45.3 %    Monocyte % 7.5 5.0 - 12.0 %    Eosinophil % 3.1 0.3 - 6.2 %    Basophil % 1.3 0.0 - 1.5 %    Immature Grans % 0.4 0.0 - 0.5 %    Neutrophils, Absolute 3.43 1.70 - 7.00 10*3/mm3    Lymphocytes, Absolute 2.50 0.70 - 3.10 10*3/mm3    Monocytes, Absolute 0.51 0.10 - 0.90 10*3/mm3    Eosinophils, Absolute 0.21 0.00 - 0.40 10*3/mm3    Basophils, Absolute 0.09 0.00 - 0.20 10*3/mm3    Immature Grans, Absolute 0.03 0.00 - 0.05 10*3/mm3    nRBC 0.0 0.0 - 0.2 /100 WBC   D-dimer, Quantitative    Collection Time: 10/06/22  3:19 PM    Specimen: Blood   Result Value Ref Range    D-Dimer, Quantitative 0.60 (H) 0.00 - 0.49 MCGFEU/mL   Troponin    Collection Time: 10/06/22  5:20 PM    Specimen: Blood   Result Value Ref Range    Troponin T <0.010 0.000 - 0.030 ng/mL   Lipase    Collection Time: 10/06/22  5:20 PM    Specimen: Blood   Result Value Ref Range    Lipase 58 13 - 60 U/L   Lipid Panel    Collection Time: 10/06/22  5:20 PM     Specimen: Blood   Result Value Ref Range    Total Cholesterol 187 0 - 200 mg/dL    Triglycerides 106 0 - 150 mg/dL    HDL Cholesterol 63 (H) 40 - 60 mg/dL    LDL Cholesterol  105 (H) 0 - 100 mg/dL    VLDL Cholesterol 19 5 - 40 mg/dL    LDL/HDL Ratio 1.63        Radiology  XR Chest 2 View    Result Date: 10/6/2022  XR CHEST 2 VW-  HISTORY: Female who is 77 years-old,  chest pain  TECHNIQUE: Frontal and lateral views of the chest  COMPARISON: 12/15/2016  FINDINGS: The heart size is normal. Aorta is tortuous. Pulmonary vasculature is unremarkable. Heart, mediastinum and pulmonary vasculature are unremarkable. No focal pulmonary consolidation, pleural effusion, or pneumothorax. Old granulomatous disease is seen. No acute osseous process.      No focal pulmonary consolidation. Tortuous aorta. Follow-up as clinically indicated.  This report was finalized on 10/6/2022 3:21 PM by Dr. Karel Khan M.D.      CT Angiogram Chest    Result Date: 10/6/2022  CT ANGIOGRAM CHEST-  INDICATIONS: Chest pain.  Radiation dose reduction techniques were utilized, including automated exposure control and exposure modulation based on body size.  TECHNIQUE: CT angiography of the chest. Three-dimensional reconstructions.  COMPARISON: 02/28/2014  FINDINGS:  No pulmonary embolism. No aortic dissection. The caliber of the ascending aorta is measured at 3.5 cm.  The heart size is normal without pericardial effusion. A few small subcentimeter short axis mediastinal lymph nodes are seen that are not significant by size criteria.  The airways appear clear.  No pleural effusion or pneumothorax.  The lungs show no focal pulmonary consolidation or mass. Old granulomatous disease is seen. Small likely atelectasis or scarring in the lower lungs.  Upper abdominal structures show no acute findings. The gallbladder is surgically absent.  Degenerative changes are seen in the spine. No acute fracture is identified.         No pulmonary embolism.  This  report was finalized on 10/6/2022 8:28 PM by Dr. Karel Khan M.D.        Medical Decision Making:  ED Course as of 10/07/22 0047   Thu Oct 06, 2022   1643 WBC: 6.77 [KA]   1643 Hemoglobin: 13.3 [KA]   1643 Glucose(!): 127 [KA]   1643 Creatinine: 0.83 [KA]   1643 Sodium(!): 128 [KA]   1643 Troponin T: <0.010 [KA]   1643 My interpretation of the chest x-ray is no cardiomegaly or acute infiltrate [KA]   1716 I reviewed viewed all the patient's labs imaging and EKG with her.  I have recommended admission for further evaluation of her chest pain.  She is agreeable with the plan. [KA]   1717 I discussed the patient with FAHEEM Javier for ED observation unit who agrees to admit for further evaluation and treatment on behalf of Dr. Tejeda. [KA]      ED Course User Index  [KA] Jeniffer Keys PA           PPE: Both the patient and I wore a surgical mask throughout the entire patient encounter. I wore protective goggles.     Diagnosis  Final diagnoses:   Chest pain, unspecified type   Elevated blood pressure reading with diagnosis of hypertension        Kirk Hernandez II, MD  10/07/22 0048       Kirk Hernandez II, MD  10/07/22 0048

## 2022-10-10 ENCOUNTER — TRANSITIONAL CARE MANAGEMENT TELEPHONE ENCOUNTER (OUTPATIENT)
Dept: CALL CENTER | Facility: HOSPITAL | Age: 77
End: 2022-10-10

## 2022-10-10 NOTE — OUTREACH NOTE
Call Center TCM Note    Flowsheet Row Responses   [facility] patient discharged from? [place]   Does the patient have one of the following disease processes/diagnoses(primary or secondary)? Other   TCM attempt successful? No   Unsuccessful attempts Attempt 1          Clotilde Kumari MA    10/10/2022, 15:52 EDT

## 2022-10-10 NOTE — OUTREACH NOTE
Call Center TCM Note    Flowsheet Row Responses   [facility] patient discharged from? [place]   Does the patient have one of the following disease processes/diagnoses(primary or secondary)? Other   TCM attempt successful? No   Unsuccessful attempts Attempt 2          Alannah Coyle RN    10/10/2022, 16:20 EDT

## 2022-10-11 ENCOUNTER — TRANSITIONAL CARE MANAGEMENT TELEPHONE ENCOUNTER (OUTPATIENT)
Dept: CALL CENTER | Facility: HOSPITAL | Age: 77
End: 2022-10-11

## 2022-10-11 NOTE — OUTREACH NOTE
Call Center TCM Note    Flowsheet Row Responses   [facility] patient discharged from? [place]   Does the patient have one of the following disease processes/diagnoses(primary or secondary)? Other   TCM attempt successful? No   Unsuccessful attempts Attempt 3   Wrap up additional comments D/C DX: chest pain ** Unable to reach pt x 3 attempts for TCM call. Pt is sched for TCM APPT with PCP Sameera Salinas on 10/17/2022. Pt also has CARDIO MD appt on 10/14/2022.          Clotilde Kumari MA    10/11/2022, 13:38 EDT

## 2022-10-14 ENCOUNTER — OFFICE VISIT (OUTPATIENT)
Dept: CARDIOLOGY | Facility: CLINIC | Age: 77
End: 2022-10-14

## 2022-10-14 VITALS
HEIGHT: 61 IN | WEIGHT: 150 LBS | SYSTOLIC BLOOD PRESSURE: 118 MMHG | HEART RATE: 68 BPM | BODY MASS INDEX: 28.32 KG/M2 | DIASTOLIC BLOOD PRESSURE: 74 MMHG

## 2022-10-14 DIAGNOSIS — R07.2 PRECORDIAL PAIN: Primary | ICD-10-CM

## 2022-10-14 DIAGNOSIS — I10 ESSENTIAL HYPERTENSION: ICD-10-CM

## 2022-10-14 PROCEDURE — 93000 ELECTROCARDIOGRAM COMPLETE: CPT | Performed by: NURSE PRACTITIONER

## 2022-10-14 PROCEDURE — 99214 OFFICE O/P EST MOD 30 MIN: CPT | Performed by: NURSE PRACTITIONER

## 2022-10-14 RX ORDER — ESTRADIOL 0.1 MG/G
CREAM VAGINAL AS NEEDED
COMMUNITY
Start: 2022-09-29 | End: 2022-11-14

## 2022-10-14 RX ORDER — VALACYCLOVIR HYDROCHLORIDE 1 G/1
TABLET, FILM COATED ORAL AS NEEDED
COMMUNITY
Start: 2022-09-29

## 2022-10-14 RX ORDER — ACETAMINOPHEN 325 MG/1
TABLET ORAL AS NEEDED
COMMUNITY
Start: 2022-07-14

## 2022-10-14 NOTE — PROGRESS NOTES
Date of Office Visit: 10/14/2022  Encounter Provider: FAHEEM Wallace  Place of Service: [facility] CARDIOLOGY  Patient Name: Sigrid Chen  :1945    Chief Complaint   Patient presents with   • Chest Pain   :     HPI: Sigrid Chen is a 77 y.o. female.  She is a patient with no prior cardiac history.  In 2021, she was evaluated by [place] cardiology for dull chest comfort.  Subsequently, she underwent a stress test demonstrating no ischemia.   On 10/7, she presented to the ED with chest pain.  Troponin and EKG were normal.  Her D-dimer was elevated and she underwent a CT of the chest which was negative for PE.  She was seen in consultation by Dr. Padilla.  He reviewed her CT demonstrated no calcium in her coronaries or peripheral blood vessels.  He did not recommend any further cardiac evaluation in the hospital.  She was advised to follow-up in 1 week.     Since leaving the hospital, she has had only 1 episode of a slight tightness in her chest while sitting in her chair at home.  Reportedly it was not nearly as severe as before and did not last as long either, only a minute or 2.  She denies any shortness of breath, palpitations, edema, dizziness, or syncope.  She has an appointment with her PCP on Monday.    Past Medical History:   Diagnosis Date   • Altered bowel elimination due to intestinal ostomy (HCC)     MICHELLE CONTINENT INTESTINAL RESERVIOR-INTUBED W/CATH RLQ (LARGE INTESTINE REMOVED)   • Anemia    • Asthma     NO CURRENT INHALERS   • Cataracts, bilateral    • Extremity pain    • GERD (gastroesophageal reflux disease)    • Herpes simplex    • History of DVT (deep vein thrombosis)     SUPERFICIAL RLE; DID NOT REQUIRE ANTICOAGULATION.  SAW DR. VLADIMIR MELGOZA   • History of MRSA infection    • History of transfusion    • History of UTI 2022    recurrent, ESBL   • Hypertension    • Low back pain    • Lumbosacral disc disease    • Migraine    •  Prediabetes    • Spinal stenosis, lumbar    • Ulcerative colitis (HCC)     COLECTOMY LARGE INTESTINE       Past Surgical History:   Procedure Laterality Date   • APPENDECTOMY     • BUNIONECTOMY Bilateral    • BUNIONECTOMY Right 02/19/2018    Procedure: RT BUNIONECTOMY WITH DOUBLE OSTEOTOMY 2ND INTERPHALANGEAL ARTHROPLASTY ;  Surgeon: Karel Thomas MD;  Location: [facility];  Service:    • CATARACT EXTRACTION, BILATERAL     • CHOLECYSTECTOMY     • COLONOSCOPY     • CONTINENT CECOSTOMY  1980    LARGE INTESTINE REMOVED, Siobhan continent Ileostomy in 1980, pouch is internal, stoma on the outside   • LUMBAR DISCECTOMY FUSION INSTRUMENTATION N/A 12/16/2016    Procedure: MICROLAMINECTOMY L3 4  L4 5 W/ METRIX;  Surgeon: Valdez Greene DO;  [facility];  Service:    • ROTATOR CUFF REPAIR Right    • VASCULAR SURGERY Right     REPAIRED VEIN RIGHT LOWER EXTREMITY REPAIRED VALVE GROIN AREA   • VENOUS CLOSURE Right     VenaSeal Procedure   • WISDOM TOOTH EXTRACTION      FOUR       Social History     Socioeconomic History   • Marital status:    Tobacco Use   • Smoking status: Never   • Smokeless tobacco: Never   Substance and Sexual Activity   • Alcohol use: Yes     Alcohol/week: 2.0 standard drinks     Types: 2 Glasses of wine per week   • Drug use: Never       Family History   Problem Relation Age of Onset   • Thyroid disease Mother    • Lung cancer Mother         cigarette use   • Hyperlipidemia Mother    • Dementia Mother    • Alcohol abuse Father    • Suicide Attempts Father    • Diabetes Sister    • Breast cancer Sister    • Hypertension Brother    • Diabetes Brother    • Diabetes Brother    • Heart disease Paternal Grandmother    • Aortic aneurysm Other    • Malig Hyperthermia Neg Hx        Review of Systems   Constitutional: Negative.   Cardiovascular: Negative.  Negative for chest pain, dyspnea on exertion, leg swelling, orthopnea, paroxysmal nocturnal dyspnea and syncope.   Respiratory: Negative.   "  Hematologic/Lymphatic: Negative for bleeding problem.   Musculoskeletal: Negative for falls.   Gastrointestinal: Negative for melena.   Neurological: Negative for dizziness and light-headedness.       Allergies   Allergen Reactions   • Codeine Nausea And Vomiting     Sick to stomach   • Other      PT SAID SHE CAN'T TAKE ANY TIME RELEASE MEDICINE OR ANYTHING WITH A \"COATING\" ON IT, PT DOES NOT HAVE LARGE INTESTINES SO MEDICATION WILL NOT DISSOLVE   • Latex Rash   • Nickel Rash   • Penicillins Rash         Current Outpatient Medications:   •  acetaminophen (TYLENOL) 325 MG tablet, As Needed., Disp: , Rfl:   •  Cranberry-Vitamin C-Vitamin E 4200-20-3 MG-MG-UNIT capsule, Take 1 tablet by mouth Daily. HOLDING FOR SURGERY, Disp: , Rfl:   •  diphenhydrAMINE (BENADRYL) 25 mg capsule, Take 25 mg by mouth At Night As Needed., Disp: , Rfl:   •  estradiol (ESTRACE) 0.1 MG/GM vaginal cream, As Needed., Disp: , Rfl:   •  GABAPENTIN PO, Take  by mouth As Needed., Disp: , Rfl:   •  hydrocortisone 2.5 % cream, As Needed., Disp: , Rfl:   •  ibuprofen (ADVIL,MOTRIN) 400 MG tablet, Take 400 mg by mouth Every 6 (Six) Hours As Needed for Mild Pain . HOLD PRIOR TO SURGERY, Disp: , Rfl:   •  loratadine (CLARITIN) 10 MG tablet, Take 10 mg by mouth Daily., Disp: , Rfl:   •  metoprolol tartrate (LOPRESSOR) 25 MG tablet, TAKE 1 TABLET BY MOUTH 2 (TWO) TIMES A DAY., Disp: 180 tablet, Rfl: 1  •  multivitamin with minerals tablet tablet, Take  by mouth., Disp: , Rfl:   •  potassium chloride 10 MEQ CR tablet, Take 10 mEq by mouth Daily., Disp: , Rfl:   •  valACYclovir (VALTREX) 1000 MG tablet, As Needed., Disp: , Rfl:   •  VITAMIN D PO, Take  by mouth., Disp: , Rfl:   •  Zinc 10 MG lozenge, Dissolve  in the mouth., Disp: , Rfl:       Objective:     Vitals:    10/14/22 1106   BP: 118/74   BP Location: Left arm   Pulse: 68   Weight: 68 kg (150 lb)   Height: 154.9 cm (61\")     Body mass index is 28.34 kg/m².    PHYSICAL EXAM:    Neck:      " Vascular: No JVD.   Pulmonary:      Effort: Pulmonary effort is normal.      Breath sounds: Normal breath sounds.   Cardiovascular:      Normal rate. Regular rhythm.      Murmurs: There is no murmur.      No gallop. No click. No rub.   Pulses:     Intact distal pulses.           ECG 12 Lead    Date/Time: 10/14/2022 11:16 AM  Performed by: Carisa Robles APRN  Authorized by: Carisa Robles APRN   Comparison: compared with previous ECG from 10/7/2022  Similar to previous ECG  Rhythm: sinus rhythm  Rate: normal  BPM: 68  T inversion: V1 and III  Other findings: non-specific ST-T wave changes              Assessment:       Diagnosis Plan   1. Precordial pain  ECG 12 Lead      2. Essential hypertension          Orders Placed This Encounter   Procedures   • ECG 12 Lead     This order was created via procedure documentation     Order Specific Question:   Release to patient     Answer:   Routine Release          Plan:       1.  Chest pain.  Her chest pain sounds atypical.  Not only did she have a normal stress test earlier this year but there was no evidence of coronary calcification on her recent CT.  I would not recommend any further cardiac evaluation.      2.  Hypertension.  Her blood pressure is stable.  Continue metoprolol.      I think she is doing well.  I am not recommending any changes.  She can follow-up with us as needed.      As always, it has been a pleasure to participate in your patient's care.      Sincerely,         FAHEEM Poole

## 2022-10-17 ENCOUNTER — OFFICE VISIT (OUTPATIENT)
Dept: INTERNAL MEDICINE | Facility: CLINIC | Age: 77
End: 2022-10-17

## 2022-10-17 VITALS
DIASTOLIC BLOOD PRESSURE: 90 MMHG | HEIGHT: 61 IN | WEIGHT: 149 LBS | SYSTOLIC BLOOD PRESSURE: 138 MMHG | HEART RATE: 68 BPM | OXYGEN SATURATION: 94 % | BODY MASS INDEX: 28.13 KG/M2 | TEMPERATURE: 96.8 F

## 2022-10-17 DIAGNOSIS — Z91.89 AT RISK FOR SLEEP APNEA: ICD-10-CM

## 2022-10-17 DIAGNOSIS — R13.12 OROPHARYNGEAL DYSPHAGIA: ICD-10-CM

## 2022-10-17 DIAGNOSIS — R07.89 ATYPICAL CHEST PAIN: ICD-10-CM

## 2022-10-17 DIAGNOSIS — R06.02 SHORTNESS OF BREATH ON EXERTION: ICD-10-CM

## 2022-10-17 DIAGNOSIS — R01.1 HEART MURMUR: ICD-10-CM

## 2022-10-17 DIAGNOSIS — Z09 HOSPITAL DISCHARGE FOLLOW-UP: Primary | ICD-10-CM

## 2022-10-17 DIAGNOSIS — I10 ESSENTIAL HYPERTENSION: ICD-10-CM

## 2022-10-17 PROCEDURE — 99495 TRANSJ CARE MGMT MOD F2F 14D: CPT | Performed by: STUDENT IN AN ORGANIZED HEALTH CARE EDUCATION/TRAINING PROGRAM

## 2022-10-17 PROCEDURE — 1111F DSCHRG MED/CURRENT MED MERGE: CPT | Performed by: STUDENT IN AN ORGANIZED HEALTH CARE EDUCATION/TRAINING PROGRAM

## 2022-10-17 NOTE — PROGRESS NOTES
"Transitional Care Follow Up Visit  Subjective     Sigrid Chen is a 77 y.o. female who presents for a transitional care management visit.    Within 48 business hours after discharge our office contacted her via telephone to coordinate her care and needs.      I reviewed and discussed the details of that call along with the discharge summary, hospital problems, inpatient lab results, inpatient diagnostic studies, and consultation reports with Sigird.     Current outpatient and discharge medications have been reconciled for the patient.  Reviewed by: Bobby Salinas DO      Date of TCM Phone Call 10/7/2022   [facility]   Date of Admission 10/6/2022   Date of Discharge 10/7/2022   Discharge Disposition Home or Self Care     Risk for Readmission (LACE) Score: 2 (10/7/2022  6:00 AM)      History of Present Illness   Course During Hospital Stay:      Per discharge summary     \"Assessment/Problem List    Chest pain        Plan:  Chest pain  Consult to cardiology, cleared for discharge home  Lipid panel-HDL cholesterol 63, LDL cholesterol 105  Serial troponins negative  Age adjusted d dimer negative     Hypertension  Monitor  Continue home medications        Disposition: Home     Follow-up after Discharge: Cardiology in 1 week\"    She has been to cardiology since then and they did not recommend any additional evaluation. She states if she has a spicy food she will take a Tums at times but \"not for a long time\". Overall denies that reflux is a significant issue for her. States she feels a small amount of walking can make her realize \"I'm breathing\" and feel a little out of breath. The episodes of chest pain she experienced were not associated with food intake. Also feels that sometimes when she swallows it feels like it will not go past her throat without drinking something. Also reports that she has been told for many years she has a heart murmur but it has been noticed at some times and not noticed " at others.      The following portions of the patient's history were reviewed and updated as appropriate: allergies, current medications, past family history, past medical history, past social history, past surgical history and problem list.        Objective   Physical Exam  Vitals reviewed.   Constitutional:       General: She is not in acute distress.     Appearance: Normal appearance. She is not ill-appearing.   HENT:      Head: Atraumatic.   Eyes:      General: No scleral icterus.  Cardiovascular:      Rate and Rhythm: Normal rate and regular rhythm.      Heart sounds: Murmur heard.    Systolic murmur is present with a grade of 2/6.  Pulmonary:      Effort: Pulmonary effort is normal. No respiratory distress.      Breath sounds: Normal breath sounds. No wheezing.   Musculoskeletal:      Right lower leg: No edema.      Left lower leg: No edema.   Skin:     Coloration: Skin is not jaundiced.   Neurological:      Mental Status: She is alert.   Psychiatric:         Mood and Affect: Mood normal.         Behavior: Behavior normal.         Thought Content: Thought content normal.         Assessment & Plan   Diagnoses and all orders for this visit:    1. Hospital discharge follow-up (Primary)  2. Atypical chest pain  3. Shortness of breath on exertion  4. Oropharyngeal dysphagia  5. Heart murmur        -pt presents to office today for hospital follow up visit for chest pain       -per review of chart, pt had CTA chest which showed no PE, chest xray showed no acute process, troponin and EKG was unremarkable  -cardiology saw pt as inpatient consult and said this was likely not cardiac and recommended continued outpatient eval; she has seen cardiology as an outpatient since then and I reviewed their note and they do not believe her symptoms are cardiac given no coronary artery calcium on her CT and recommend workup of other causes   -in office today she is in no acute distress, O2 slightly decreased at 94%. She has no  smoking history but I will obtain PFT to rule out obstructive/restrictive lung disease as a cause of symptoms  -I do appreciate a 2/6 systolic murmur today, will obtain TTE to rule out significant valvular disease  -she also reports some difficulty swallowing thicker foods, only in the throat and not getting stuck in the chest ; will obtain esophagram to evaluate   -     Pulmonary Function Test; Future  -     Adult Transthoracic Echo Complete W/ Cont if Necessary Per Protocol; Future  -     FL Esophagram Complete Double-Contrast; Future  -     Adult Transthoracic Echo Complete W/ Cont if Necessary Per Protocol; Future    6. Essential hypertension  -BP uncontrolled at 138/90 in office today, last visit with me it was 144/76  -currently on metoprolol tartrate 25 mg twice daily  -will increase BP regimen by changing morning metoprolol tartrate to 37.5 mg (1.5 tab) and evening remain on 25 mg   -     metoprolol tartrate (LOPRESSOR) 25 MG tablet; Take 1.5 tablets by mouth Every Morning AND 1 tablet Every Evening.  Dispense: 180 tablet; Refill: 1    7. At risk for sleep apnea  -reports waking up feeling unrested   -denies being told that she snores  -reports multiple night time awakenings   -will refer to sleep medicine for consideration of sleep study  -     Ambulatory Referral to Sleep Medicine

## 2022-11-14 ENCOUNTER — OFFICE VISIT (OUTPATIENT)
Dept: INTERNAL MEDICINE | Facility: CLINIC | Age: 77
End: 2022-11-14

## 2022-11-14 VITALS
HEART RATE: 67 BPM | TEMPERATURE: 97.1 F | RESPIRATION RATE: 18 BRPM | DIASTOLIC BLOOD PRESSURE: 80 MMHG | BODY MASS INDEX: 29.68 KG/M2 | HEIGHT: 60 IN | OXYGEN SATURATION: 92 % | SYSTOLIC BLOOD PRESSURE: 118 MMHG | WEIGHT: 151.2 LBS

## 2022-11-14 DIAGNOSIS — I10 ESSENTIAL HYPERTENSION: Primary | ICD-10-CM

## 2022-11-14 PROBLEM — M75.101 TEAR OF RIGHT ROTATOR CUFF: Status: ACTIVE | Noted: 2019-09-03

## 2022-11-14 PROBLEM — R12 HEARTBURN: Status: ACTIVE | Noted: 2020-01-22

## 2022-11-14 PROBLEM — N39.0 FREQUENT URINARY TRACT INFECTIONS: Status: ACTIVE | Noted: 2021-10-14

## 2022-11-14 PROBLEM — Z86.718 HX OF BLOOD CLOTS: Status: ACTIVE | Noted: 2021-10-28

## 2022-11-14 PROBLEM — G43.009 MIGRAINE WITHOUT AURA AND WITHOUT STATUS MIGRAINOSUS, NOT INTRACTABLE: Status: ACTIVE | Noted: 2020-10-01

## 2022-11-14 PROCEDURE — 99214 OFFICE O/P EST MOD 30 MIN: CPT | Performed by: STUDENT IN AN ORGANIZED HEALTH CARE EDUCATION/TRAINING PROGRAM

## 2022-11-14 RX ORDER — AMLODIPINE BESYLATE 2.5 MG/1
2.5 TABLET ORAL DAILY
Qty: 30 TABLET | Refills: 1 | Status: SHIPPED | OUTPATIENT
Start: 2022-11-14 | End: 2022-12-09 | Stop reason: SDUPTHER

## 2022-11-14 RX ORDER — NITROFURANTOIN 25; 75 MG/1; MG/1
100 CAPSULE ORAL 2 TIMES DAILY
COMMUNITY
Start: 2022-11-01 | End: 2022-11-14

## 2022-11-14 NOTE — PROGRESS NOTES
"  Bobby Salinas D.O.  Internal Medicine  [facility]  [address], [address]  [place] [address]  [phone]      Chief Complaint  Hypertension    SUBJECTIVE    History of Present Illness    Sigrid Chen is a 77 y.o. female who presents to the office today as an established patient that last saw me on 10/17/2022.     Here today for BP follow up. At last visit her BP regimen was changed to metoprolol tartrate to 37.5 mg (1.5 tab) and evening 25 mg.Blood pressure log is below:          Allergies   Allergen Reactions   • Codeine Nausea And Vomiting     Sick to stomach   • Other      PT SAID SHE CAN'T TAKE ANY TIME RELEASE MEDICINE OR ANYTHING WITH A \"COATING\" ON IT, PT DOES NOT HAVE LARGE INTESTINES SO MEDICATION WILL NOT DISSOLVE   • Latex Rash   • Nickel Rash   • Penicillins Rash        Outpatient Medications Marked as Taking for the 11/14/22 encounter (Office Visit) with Bobby Salinas, DO   Medication Sig Dispense Refill   • acetaminophen (TYLENOL) 325 MG tablet As Needed.     • Cranberry-Vitamin C-Vitamin E 4200-20-3 MG-MG-UNIT capsule Take 1 tablet by mouth Daily. HOLDING FOR SURGERY     • diphenhydrAMINE (BENADRYL) 25 mg capsule Take 25 mg by mouth At Night As Needed.     • GABAPENTIN PO Take  by mouth As Needed.     • ibuprofen (ADVIL,MOTRIN) 400 MG tablet Take 400 mg by mouth Every 6 (Six) Hours As Needed for Mild Pain . HOLD PRIOR TO SURGERY     • loratadine (CLARITIN) 10 MG tablet Take 10 mg by mouth Daily.     • metoprolol tartrate (LOPRESSOR) 25 MG tablet Take 1.5 tablets by mouth Every Morning AND 1 tablet Every Evening. 180 tablet 1   • multivitamin with minerals tablet tablet Take  by mouth.     • potassium chloride 10 MEQ CR tablet Take 10 mEq by mouth Daily.     • valACYclovir (VALTREX) 1000 MG tablet As Needed.          Past Medical History:   Diagnosis Date   • Altered bowel elimination due to intestinal ostomy (HCC)     MICHELLE CONTINENT INTESTINAL RESERVIOR-INTUBED " "W/CATH RLQ (LARGE INTESTINE REMOVED)   • Anemia    • Asthma     NO CURRENT INHALERS   • Cataracts, bilateral    • Extremity pain    • GERD (gastroesophageal reflux disease)    • Herpes simplex    • History of DVT (deep vein thrombosis)     SUPERFICIAL RLE; DID NOT REQUIRE ANTICOAGULATION.  SAW DR. VLADIMIR MELGOZA   • History of MRSA infection 1980   • History of transfusion    • History of UTI 04/06/2022    recurrent, ESBL   • Hypertension    • Low back pain    • Lumbosacral disc disease    • Migraine    • Prediabetes    • Spinal stenosis, lumbar    • Ulcerative colitis (HCC)     COLECTOMY LARGE INTESTINE       OBJECTIVE    Vital Signs:   /80 (BP Location: Left arm)   Pulse 67   Temp 97.1 °F (36.2 °C) (Temporal)   Resp 18   Ht 152.4 cm (60\")   Wt 68.6 kg (151 lb 3.2 oz)   SpO2 92%   BMI 29.53 kg/m²     Physical Exam  Vitals reviewed.   Constitutional:       General: She is not in acute distress.     Appearance: Normal appearance. She is not ill-appearing.   HENT:      Head: Normocephalic and atraumatic.   Eyes:      General: No scleral icterus.  Cardiovascular:      Rate and Rhythm: Normal rate and regular rhythm.      Heart sounds: Normal heart sounds. No murmur heard.  Pulmonary:      Effort: Pulmonary effort is normal. No respiratory distress.      Breath sounds: Normal breath sounds. No wheezing.   Musculoskeletal:      Right lower leg: No edema.      Left lower leg: No edema.   Skin:     Coloration: Skin is not jaundiced.   Neurological:      Mental Status: She is alert.   Psychiatric:         Mood and Affect: Mood normal.         Behavior: Behavior normal.         Thought Content: Thought content normal.                             ASSESSMENT & PLAN     Diagnoses and all orders for this visit:    1. Essential hypertension (Primary)  -remains overall uncontrolled on metoprolol alone (see home numbers above)  -discussed with patient the goal BP for both systolic and diastolic  -at this point we cannot " increase her metoprolol further due to her borderline low HR  -will start amlodipine 2.5 mg daily; discussed common side effects including lower extremity edema  -continue at home BP checks and return in January for BP follow up  -     amLODIPine (NORVASC) 2.5 MG tablet; Take 1 tablet by mouth Daily.  Dispense: 30 tablet; Refill: 1            The following social determinates of health impact the patient's medical decision making: No social determinates of health were factored in to today's visit.     Follow Up  No follow-ups on file.    Patient/family had no further questions at this time and verbalized understanding of the plan discussed today.

## 2022-11-22 ENCOUNTER — HOSPITAL ENCOUNTER (OUTPATIENT)
Dept: RESPIRATORY THERAPY | Facility: HOSPITAL | Age: 77
Discharge: HOME | End: 2022-11-22

## 2022-11-22 ENCOUNTER — HOSPITAL ENCOUNTER (OUTPATIENT)
Dept: GENERAL RADIOLOGY | Facility: HOSPITAL | Age: 77
Discharge: HOME | End: 2022-11-22

## 2022-11-22 ENCOUNTER — HOSPITAL ENCOUNTER (OUTPATIENT)
Dept: CARDIOLOGY | Facility: HOSPITAL | Age: 77
Discharge: HOME | End: 2022-11-22

## 2022-11-22 VITALS
DIASTOLIC BLOOD PRESSURE: 80 MMHG | BODY MASS INDEX: 29.64 KG/M2 | HEIGHT: 60 IN | HEART RATE: 60 BPM | SYSTOLIC BLOOD PRESSURE: 118 MMHG | WEIGHT: 151 LBS

## 2022-11-22 DIAGNOSIS — R06.02 SHORTNESS OF BREATH ON EXERTION: ICD-10-CM

## 2022-11-22 DIAGNOSIS — R13.12 OROPHARYNGEAL DYSPHAGIA: ICD-10-CM

## 2022-11-22 DIAGNOSIS — R01.1 HEART MURMUR: ICD-10-CM

## 2022-11-22 LAB
AORTIC ARCH: 2.8 CM
ASCENDING AORTA: 3.2 CM
BH CV ECHO MEAS - ACS: 1.44 CM
BH CV ECHO MEAS - AO MAX PG: 9.5 MMHG
BH CV ECHO MEAS - AO MEAN PG: 4.8 MMHG
BH CV ECHO MEAS - AO ROOT DIAM: 2.6 CM
BH CV ECHO MEAS - AO V2 MAX: 153.8 CM/SEC
BH CV ECHO MEAS - AO V2 VTI: 30.7 CM
BH CV ECHO MEAS - AVA(I,D): 2.9 CM2
BH CV ECHO MEAS - EDV(CUBED): 19.7 ML
BH CV ECHO MEAS - EDV(MOD-SP2): 38 ML
BH CV ECHO MEAS - EDV(MOD-SP4): 46 ML
BH CV ECHO MEAS - EF(MOD-BP): 61.2 %
BH CV ECHO MEAS - EF(MOD-SP2): 65.8 %
BH CV ECHO MEAS - EF(MOD-SP4): 58.7 %
BH CV ECHO MEAS - ESV(CUBED): 5.8 ML
BH CV ECHO MEAS - ESV(MOD-SP2): 13 ML
BH CV ECHO MEAS - ESV(MOD-SP4): 19 ML
BH CV ECHO MEAS - FS: 33.4 %
BH CV ECHO MEAS - IVS/LVPW: 1.01 CM
BH CV ECHO MEAS - IVSD: 1.15 CM
BH CV ECHO MEAS - LAT PEAK E' VEL: 6.7 CM/SEC
BH CV ECHO MEAS - LV DIASTOLIC VOL/BSA (35-75): 27.8 CM2
BH CV ECHO MEAS - LV MASS(C)D: 88.3 GRAMS
BH CV ECHO MEAS - LV MAX PG: 5.9 MMHG
BH CV ECHO MEAS - LV MEAN PG: 3.1 MMHG
BH CV ECHO MEAS - LV SYSTOLIC VOL/BSA (12-30): 11.5 CM2
BH CV ECHO MEAS - LV V1 MAX: 121.8 CM/SEC
BH CV ECHO MEAS - LV V1 VTI: 27.9 CM
BH CV ECHO MEAS - LVIDD: 2.7 CM
BH CV ECHO MEAS - LVIDS: 1.8 CM
BH CV ECHO MEAS - LVOT AREA: 3.2 CM2
BH CV ECHO MEAS - LVOT DIAM: 2.01 CM
BH CV ECHO MEAS - LVPWD: 1.15 CM
BH CV ECHO MEAS - MED PEAK E' VEL: 5.4 CM/SEC
BH CV ECHO MEAS - MV A DUR: 0.13 SEC
BH CV ECHO MEAS - MV A MAX VEL: 120 CM/SEC
BH CV ECHO MEAS - MV DEC SLOPE: 376 CM/SEC2
BH CV ECHO MEAS - MV DEC TIME: 0.25 MSEC
BH CV ECHO MEAS - MV E MAX VEL: 75.3 CM/SEC
BH CV ECHO MEAS - MV E/A: 0.63
BH CV ECHO MEAS - MV MAX PG: 6.3 MMHG
BH CV ECHO MEAS - MV MEAN PG: 2.43 MMHG
BH CV ECHO MEAS - MV P1/2T: 65 MSEC
BH CV ECHO MEAS - MV V2 VTI: 26.9 CM
BH CV ECHO MEAS - MVA(P1/2T): 3.4 CM2
BH CV ECHO MEAS - MVA(VTI): 3.3 CM2
BH CV ECHO MEAS - PA ACC TIME: 0.13 SEC
BH CV ECHO MEAS - PA PR(ACCEL): 19.6 MMHG
BH CV ECHO MEAS - PA V2 MAX: 122.2 CM/SEC
BH CV ECHO MEAS - PULM A REVS DUR: 0.16 SEC
BH CV ECHO MEAS - PULM A REVS VEL: 31 CM/SEC
BH CV ECHO MEAS - PULM DIAS VEL: 39.9 CM/SEC
BH CV ECHO MEAS - PULM S/D: 1.47
BH CV ECHO MEAS - PULM SYS VEL: 58.8 CM/SEC
BH CV ECHO MEAS - QP/QS: 0.42
BH CV ECHO MEAS - RAP SYSTOLE: 3 MMHG
BH CV ECHO MEAS - RV MAX PG: 2.15 MMHG
BH CV ECHO MEAS - RV V1 MAX: 73.3 CM/SEC
BH CV ECHO MEAS - RV V1 VTI: 14.5 CM
BH CV ECHO MEAS - RVOT DIAM: 1.81 CM
BH CV ECHO MEAS - RVSP: 24 MMHG
BH CV ECHO MEAS - SI(MOD-SP2): 15.1 ML/M2
BH CV ECHO MEAS - SI(MOD-SP4): 16.3 ML/M2
BH CV ECHO MEAS - SUP REN AO DIAM: 2.2 CM
BH CV ECHO MEAS - SV(LVOT): 88.9 ML
BH CV ECHO MEAS - SV(MOD-SP2): 25 ML
BH CV ECHO MEAS - SV(MOD-SP4): 27 ML
BH CV ECHO MEAS - SV(RVOT): 37.3 ML
BH CV ECHO MEAS - TAPSE (>1.6): 2.07 CM
BH CV ECHO MEAS - TR MAX PG: 21.2 MMHG
BH CV ECHO MEAS - TR MAX VEL: 230.4 CM/SEC
BH CV ECHO MEASUREMENTS AVERAGE E/E' RATIO: 12.45
BH CV XLRA - RV BASE: 2.7 CM
BH CV XLRA - RV LENGTH: 5.5 CM
BH CV XLRA - RV MID: 2.25 CM
BH CV XLRA - TDI S': 13.5 CM/SEC
LEFT ATRIUM VOLUME INDEX: 16.7 ML/M2
MAXIMAL PREDICTED HEART RATE: 143 BPM
SINUS: 2.6 CM
STJ: 2.48 CM
STRESS TARGET HR: 122 BPM

## 2022-11-22 PROCEDURE — 63710000001 SOD BICARB-CITRIC ACID-SIMETHICONE 2.21-1.53-0.04 G PACK: Performed by: STUDENT IN AN ORGANIZED HEALTH CARE EDUCATION/TRAINING PROGRAM

## 2022-11-22 PROCEDURE — 94060 EVALUATION OF WHEEZING: CPT

## 2022-11-22 PROCEDURE — A9270 NON-COVERED ITEM OR SERVICE: HCPCS | Performed by: STUDENT IN AN ORGANIZED HEALTH CARE EDUCATION/TRAINING PROGRAM

## 2022-11-22 PROCEDURE — 63710000001 BARIUM SULFATE 98 % RECONSTITUTED SUSPENSION: Performed by: STUDENT IN AN ORGANIZED HEALTH CARE EDUCATION/TRAINING PROGRAM

## 2022-11-22 PROCEDURE — 93306 TTE W/DOPPLER COMPLETE: CPT | Performed by: INTERNAL MEDICINE

## 2022-11-22 PROCEDURE — 94640 AIRWAY INHALATION TREATMENT: CPT

## 2022-11-22 PROCEDURE — 63710000001 BARIUM SULFATE 96 % RECONSTITUTED SUSPENSION: Performed by: STUDENT IN AN ORGANIZED HEALTH CARE EDUCATION/TRAINING PROGRAM

## 2022-11-22 PROCEDURE — 74221 X-RAY XM ESOPHAGUS 2CNTRST: CPT

## 2022-11-22 PROCEDURE — 93306 TTE W/DOPPLER COMPLETE: CPT

## 2022-11-22 RX ORDER — ALBUTEROL SULFATE 2.5 MG/3ML
2.5 SOLUTION RESPIRATORY (INHALATION) ONCE
Status: COMPLETED | OUTPATIENT
Start: 2022-11-22 | End: 2022-11-22

## 2022-11-22 RX ADMIN — ANTACID/ANTIFLATULENT 1 PACKET: 380; 550; 10; 10 GRANULE, EFFERVESCENT ORAL at 08:55

## 2022-11-22 RX ADMIN — BARIUM SULFATE 183 ML: 960 POWDER, FOR SUSPENSION ORAL at 08:55

## 2022-11-22 RX ADMIN — ALBUTEROL SULFATE 2.5 MG: 2.5 SOLUTION RESPIRATORY (INHALATION) at 09:38

## 2022-11-22 RX ADMIN — BARIUM SULFATE 135 ML: 980 POWDER, FOR SUSPENSION ORAL at 08:55

## 2022-12-02 ENCOUNTER — OFFICE VISIT (OUTPATIENT)
Dept: INTERNAL MEDICINE | Facility: CLINIC | Age: 77
End: 2022-12-02

## 2022-12-02 ENCOUNTER — HOSPITAL ENCOUNTER (OUTPATIENT)
Dept: CARDIOLOGY | Facility: HOSPITAL | Age: 77
Discharge: HOME | End: 2022-12-02
Admitting: NURSE PRACTITIONER

## 2022-12-02 VITALS
HEIGHT: 61 IN | DIASTOLIC BLOOD PRESSURE: 70 MMHG | SYSTOLIC BLOOD PRESSURE: 120 MMHG | HEART RATE: 88 BPM | WEIGHT: 149 LBS | BODY MASS INDEX: 28.13 KG/M2 | RESPIRATION RATE: 16 BRPM

## 2022-12-02 DIAGNOSIS — M79.604 RIGHT LEG PAIN: Primary | ICD-10-CM

## 2022-12-02 DIAGNOSIS — M79.604 RIGHT LEG PAIN: ICD-10-CM

## 2022-12-02 PROBLEM — Z00.00 MEDICARE ANNUAL WELLNESS VISIT, INITIAL: Status: RESOLVED | Noted: 2017-10-05 | Resolved: 2022-12-02

## 2022-12-02 LAB
BH CV LOW VAS RIGHT GREATER SAPH AK VESSEL: 1
BH CV LOW VAS RIGHT VARICOSITY BK VESSEL: 1
BH CV LOWER VASCULAR LEFT COMMON FEMORAL AUGMENT: NORMAL
BH CV LOWER VASCULAR LEFT COMMON FEMORAL COMPETENT: NORMAL
BH CV LOWER VASCULAR LEFT COMMON FEMORAL COMPRESS: NORMAL
BH CV LOWER VASCULAR LEFT COMMON FEMORAL PHASIC: NORMAL
BH CV LOWER VASCULAR LEFT COMMON FEMORAL SPONT: NORMAL
BH CV LOWER VASCULAR RIGHT COMMON FEMORAL AUGMENT: NORMAL
BH CV LOWER VASCULAR RIGHT COMMON FEMORAL COMPETENT: NORMAL
BH CV LOWER VASCULAR RIGHT COMMON FEMORAL COMPRESS: NORMAL
BH CV LOWER VASCULAR RIGHT COMMON FEMORAL PHASIC: NORMAL
BH CV LOWER VASCULAR RIGHT COMMON FEMORAL SPONT: NORMAL
BH CV LOWER VASCULAR RIGHT DISTAL FEMORAL COMPRESS: NORMAL
BH CV LOWER VASCULAR RIGHT GASTRONEMIUS COMPRESS: NORMAL
BH CV LOWER VASCULAR RIGHT GREATER SAPH AK COMPRESS: NORMAL
BH CV LOWER VASCULAR RIGHT GREATER SAPH AK THROMBUS: NORMAL
BH CV LOWER VASCULAR RIGHT GREATER SAPH BK COMPRESS: NORMAL
BH CV LOWER VASCULAR RIGHT LESSER SAPH COMPRESS: NORMAL
BH CV LOWER VASCULAR RIGHT MID FEMORAL AUGMENT: NORMAL
BH CV LOWER VASCULAR RIGHT MID FEMORAL COMPETENT: NORMAL
BH CV LOWER VASCULAR RIGHT MID FEMORAL COMPRESS: NORMAL
BH CV LOWER VASCULAR RIGHT MID FEMORAL PHASIC: NORMAL
BH CV LOWER VASCULAR RIGHT MID FEMORAL SPONT: NORMAL
BH CV LOWER VASCULAR RIGHT PERONEAL COMPRESS: NORMAL
BH CV LOWER VASCULAR RIGHT POPLITEAL AUGMENT: NORMAL
BH CV LOWER VASCULAR RIGHT POPLITEAL COMPETENT: NORMAL
BH CV LOWER VASCULAR RIGHT POPLITEAL COMPRESS: NORMAL
BH CV LOWER VASCULAR RIGHT POPLITEAL PHASIC: NORMAL
BH CV LOWER VASCULAR RIGHT POPLITEAL SPONT: NORMAL
BH CV LOWER VASCULAR RIGHT POSTERIOR TIBIAL COMPRESS: NORMAL
BH CV LOWER VASCULAR RIGHT PROFUNDA FEMORAL COMPRESS: NORMAL
BH CV LOWER VASCULAR RIGHT PROXIMAL FEMORAL COMPRESS: NORMAL
BH CV LOWER VASCULAR RIGHT SAPHENOFEMORAL JUNCTION COMPRESS: NORMAL
BH CV LOWER VASCULAR RIGHT VARICOSITY BK COMPRESS: NORMAL
BH CV LOWER VASCULAR RIGHT VARICOSITY BK THROMBUS: NORMAL
MAXIMAL PREDICTED HEART RATE: 143 BPM
STRESS TARGET HR: 122 BPM

## 2022-12-02 PROCEDURE — 99213 OFFICE O/P EST LOW 20 MIN: CPT | Performed by: NURSE PRACTITIONER

## 2022-12-02 PROCEDURE — 93971 EXTREMITY STUDY: CPT

## 2022-12-02 RX ORDER — GABAPENTIN 100 MG/1
CAPSULE ORAL
COMMUNITY
Start: 2022-11-28

## 2022-12-02 NOTE — PROGRESS NOTES
Subjective   Sigrid Chen is a 77 y.o. female.   Chief Complaint   Patient presents with   • Leg Pain     And redness, warm to touch x 1 week      Vitals:    12/02/22 0903   BP: 120/70   Pulse: 88   Resp: 16     No LMP recorded. Patient is postmenopausal.    History of Present Illness  Sigrid is a 77 year old female patient of Dr Salinas who is here for an acute visit. She c/o redness, swelling, and pain to the right lower leg for the last week. She denies any injury. She has seen vascular and had surgery on her right leg. She has a history of superficial venous thrombosis. She does have an upcoming appt with Dr Vinson in January.     The following portions of the patient's history were reviewed and updated as appropriate: allergies, current medications, past family history, past medical history, past social history, past surgical history and problem list.    Review of Systems   Constitutional: Negative for fatigue and fever.   Respiratory: Negative for shortness of breath.    Cardiovascular: Positive for leg swelling. Negative for chest pain.   Skin: Positive for color change and rash.   Neurological: Negative for numbness.       Objective   Physical Exam  Vitals and nursing note reviewed.   Constitutional:       General: She is not in acute distress.     Appearance: Normal appearance. She is well-developed and well-groomed.   Cardiovascular:      Rate and Rhythm: Normal rate.      Pulses:           Dorsalis pedis pulses are 2+ on the right side.        Posterior tibial pulses are 2+ on the right side.      Comments: Mild redness noted , lower extremity warm but not hot to touch, varicose veins noted   Pulmonary:      Effort: Pulmonary effort is normal.   Musculoskeletal:      Right lower leg: No edema.      Left lower leg: No edema.   Neurological:      Mental Status: She is alert.         Assessment & Plan   Diagnoses and all orders for this visit:    1. Right leg pain (Primary)  -     Duplex Venous Lower  Extremity - Right CAR; Future      No evidence of cellulitis on exam  Stat doppler prelim was negative for DVT but showed chronic SVT from 2017  Discussed results with patient. She will follow up with vascular  Keep leg elevated, recommend warm compresses and compression stockings  Follow up if symptoms persist, worsen or new symptoms develop

## 2022-12-02 NOTE — PROGRESS NOTES
Today's preliminary report for right lower extremity venous Doppler is negative for DVT and positive for chronic superficial venous thrombus. Report called to FAHEEM Hanson, who spoke with the patient on the phone before she left.

## 2022-12-09 DIAGNOSIS — I10 ESSENTIAL HYPERTENSION: ICD-10-CM

## 2022-12-09 RX ORDER — AMLODIPINE BESYLATE 2.5 MG/1
2.5 TABLET ORAL DAILY
Qty: 30 TABLET | Refills: 1 | Status: SHIPPED | OUTPATIENT
Start: 2022-12-09 | End: 2023-01-04 | Stop reason: SDUPTHER

## 2022-12-09 NOTE — TELEPHONE ENCOUNTER
Caller: Sigrid Chen    Relationship: Self    Best call back number: [phone]    Requested Prescriptions:   Requested Prescriptions     Pending Prescriptions Disp Refills   • amLODIPine (NORVASC) 2.5 MG tablet 30 tablet 1     Sig: Take 1 tablet by mouth Daily.        Pharmacy where request should be sent:      Additional details provided by patient:     Does the patient have less than a 3 day supply:  [x] Yes  [] No    Would you like a call back once the refill request has been completed: [x] Yes [] No    If the office needs to give you a call back, can they leave a voicemail: [x] Yes [] No    Brown Gutierrez Rep   12/09/22 14:43 EST

## 2023-01-04 ENCOUNTER — OFFICE VISIT (OUTPATIENT)
Dept: INTERNAL MEDICINE | Facility: CLINIC | Age: 78
End: 2023-01-04
Payer: MEDICARE

## 2023-01-04 VITALS
OXYGEN SATURATION: 97 % | HEIGHT: 61 IN | SYSTOLIC BLOOD PRESSURE: 132 MMHG | BODY MASS INDEX: 27.75 KG/M2 | WEIGHT: 147 LBS | DIASTOLIC BLOOD PRESSURE: 80 MMHG | HEART RATE: 89 BPM

## 2023-01-04 DIAGNOSIS — D64.9 NORMOCYTIC ANEMIA: Primary | ICD-10-CM

## 2023-01-04 DIAGNOSIS — I10 ESSENTIAL HYPERTENSION: ICD-10-CM

## 2023-01-04 PROCEDURE — 99214 OFFICE O/P EST MOD 30 MIN: CPT | Performed by: STUDENT IN AN ORGANIZED HEALTH CARE EDUCATION/TRAINING PROGRAM

## 2023-01-04 PROCEDURE — 1159F MED LIST DOCD IN RCRD: CPT | Performed by: STUDENT IN AN ORGANIZED HEALTH CARE EDUCATION/TRAINING PROGRAM

## 2023-01-04 RX ORDER — AMLODIPINE BESYLATE 2.5 MG/1
2.5 TABLET ORAL DAILY
Qty: 90 TABLET | Refills: 1 | Status: SHIPPED | OUTPATIENT
Start: 2023-01-04

## 2023-01-04 NOTE — PROGRESS NOTES
Bobby Salinas D.O.  Internal Medicine  [facility]  [address], [address]  [place] [address]  [phone]      Chief Complaint  Hypertension    SUBJECTIVE    History of Present Illness    Sigrid Chen is a 77 y.o. female who presents to the office today as an established patient that last saw me on 11/14/2022.     HTN: started amlodipine 2.5 mg once daily, no longer taking metoprolol. She felt metoprolol was making her mouth dry and that is now resolved.     Allergies   Allergen Reactions   • Codeine Nausea And Vomiting     Sick to stomach   • Other      PT SAID SHE CAN'T TAKE ANY TIME RELEASE MEDICINE OR ANYTHING WITH A \"COATING\" ON IT, PT DOES NOT HAVE LARGE INTESTINES SO MEDICATION WILL NOT DISSOLVE   • Latex Rash   • Nickel Rash   • Penicillins Rash        Outpatient Medications Marked as Taking for the 1/4/23 encounter (Office Visit) with Bobby Salinas, DO   Medication Sig Dispense Refill   • acetaminophen (TYLENOL) 325 MG tablet As Needed.     • amLODIPine (NORVASC) 2.5 MG tablet Take 1 tablet by mouth Daily. 90 tablet 1   • Cranberry-Vitamin C-Vitamin E 4200-20-3 MG-MG-UNIT capsule Take 1 tablet by mouth Daily. HOLDING FOR SURGERY     • diphenhydrAMINE (BENADRYL) 25 mg capsule Take 25 mg by mouth At Night As Needed.     • gabapentin (NEURONTIN) 100 MG capsule      • ibuprofen (ADVIL,MOTRIN) 400 MG tablet Take 400 mg by mouth Every 6 (Six) Hours As Needed for Mild Pain . HOLD PRIOR TO SURGERY     • loratadine (CLARITIN) 10 MG tablet Take 10 mg by mouth Daily.     • multivitamin with minerals tablet tablet Take  by mouth.     • neomycin-polymyxin-hydrocortisone (CORTISPORIN) 3.5-75989-2 otic solution Administer 3 drops to the right ear 4 (Four) Times a Day. 10 mL 0   • potassium chloride 10 MEQ CR tablet Take 10 mEq by mouth Daily.     • valACYclovir (VALTREX) 1000 MG tablet As Needed.     • [DISCONTINUED] amLODIPine (NORVASC) 2.5 MG tablet Take 1 tablet by mouth Daily. 30  tablet 1        Past Medical History:   Diagnosis Date   • Altered bowel elimination due to intestinal ostomy (HCC)     MICHELLE CONTINENT INTESTINAL RESERVIOR-INTUBED W/CATH RLQ (LARGE INTESTINE REMOVED)   • Anemia    • Asthma     NO CURRENT INHALERS   • Cataracts, bilateral    • Extremity pain    • GERD (gastroesophageal reflux disease)    • Herpes simplex    • History of DVT (deep vein thrombosis)     SUPERFICIAL RLE; DID NOT REQUIRE ANTICOAGULATION.  SAW DR. VLADIMIR MELGOZA   • History of MRSA infection 1980   • History of transfusion    • History of UTI 04/06/2022    recurrent, ESBL   • Hypertension    • Low back pain    • Lumbosacral disc disease    • Migraine    • Prediabetes    • Spinal stenosis, lumbar    • Ulcerative colitis (HCC)     COLECTOMY LARGE INTESTINE       OBJECTIVE    Vital Signs:   /80   Pulse 89   Ht 154.9 cm (61\")   Wt 66.7 kg (147 lb)   SpO2 97%   BMI 27.78 kg/m²     Physical Exam  Vitals reviewed.   Constitutional:       General: She is not in acute distress.     Appearance: Normal appearance. She is not ill-appearing.   HENT:      Head: Normocephalic and atraumatic.   Eyes:      General: No scleral icterus.  Cardiovascular:      Rate and Rhythm: Normal rate and regular rhythm.      Heart sounds: Murmur heard.    Systolic murmur is present with a grade of 3/6.  Pulmonary:      Effort: Pulmonary effort is normal. No respiratory distress.      Breath sounds: Normal breath sounds. No stridor. No wheezing or rhonchi.   Musculoskeletal:      Right lower leg: No edema.      Left lower leg: No edema.   Skin:     Coloration: Skin is not jaundiced.   Neurological:      Mental Status: She is alert.   Psychiatric:         Mood and Affect: Mood normal.         Behavior: Behavior normal.         Thought Content: Thought content normal.                             ASSESSMENT & PLAN     Diagnoses and all orders for this visit:    1. Normocytic anemia (Primary)  Lab Results   Component Value Date     WBC 5.39 10/07/2022    HGB 11.7 (L) 10/07/2022    HCT 35.0 10/07/2022    MCV 88.6 10/07/2022     10/07/2022     -mild normocytic anemia 10/2022; she reports history of iron deficiency but I see normal CBCs over the last several years  -will repeat CBC today, plan anemia workup if persistent  -she denies bloody or black ostomy output   -     CBC w AUTO Differential    2. Essential hypertension  -acceptable control at 132/80 in office today on amlodipine 2.5 mg daily only  -continue this regimen  -renal function and electrolyte check up to date  -     amLODIPine (NORVASC) 2.5 MG tablet; Take 1 tablet by mouth Daily.  Dispense: 90 tablet; Refill: 1            The following social determinates of health impact the patient's medical decision making: No social determinates of health were factored in to today's visit.     Follow Up  Return in about 5 months (around 6/4/2023) for Recheck.    Patient/family had no further questions at this time and verbalized understanding of the plan discussed today.

## 2023-01-05 LAB
BASOPHILS # BLD AUTO: 0.08 10*3/MM3 (ref 0–0.2)
BASOPHILS NFR BLD AUTO: 0.9 % (ref 0–1.5)
EOSINOPHIL # BLD AUTO: 0.23 10*3/MM3 (ref 0–0.4)
EOSINOPHIL NFR BLD AUTO: 2.5 % (ref 0.3–6.2)
ERYTHROCYTE [DISTWIDTH] IN BLOOD BY AUTOMATED COUNT: 13.3 % (ref 12.3–15.4)
HCT VFR BLD AUTO: 37.3 % (ref 34–46.6)
HGB BLD-MCNC: 12.6 G/DL (ref 12–15.9)
IMM GRANULOCYTES # BLD AUTO: 0.02 10*3/MM3 (ref 0–0.05)
IMM GRANULOCYTES NFR BLD AUTO: 0.2 % (ref 0–0.5)
LYMPHOCYTES # BLD AUTO: 2.99 10*3/MM3 (ref 0.7–3.1)
LYMPHOCYTES NFR BLD AUTO: 32.4 % (ref 19.6–45.3)
MCH RBC QN AUTO: 29.9 PG (ref 26.6–33)
MCHC RBC AUTO-ENTMCNC: 33.8 G/DL (ref 31.5–35.7)
MCV RBC AUTO: 88.6 FL (ref 79–97)
MONOCYTES # BLD AUTO: 0.64 10*3/MM3 (ref 0.1–0.9)
MONOCYTES NFR BLD AUTO: 6.9 % (ref 5–12)
NEUTROPHILS # BLD AUTO: 5.27 10*3/MM3 (ref 1.7–7)
NEUTROPHILS NFR BLD AUTO: 57.1 % (ref 42.7–76)
NRBC BLD AUTO-RTO: 0 /100 WBC (ref 0–0.2)
PLATELET # BLD AUTO: 256 10*3/MM3 (ref 140–450)
RBC # BLD AUTO: 4.21 10*6/MM3 (ref 3.77–5.28)
WBC # BLD AUTO: 9.23 10*3/MM3 (ref 3.4–10.8)

## 2023-01-23 ENCOUNTER — OFFICE VISIT (OUTPATIENT)
Dept: SLEEP MEDICINE | Facility: HOSPITAL | Age: 78
End: 2023-01-23
Payer: MEDICARE

## 2023-01-23 VITALS
HEIGHT: 61 IN | WEIGHT: 148 LBS | OXYGEN SATURATION: 96 % | DIASTOLIC BLOOD PRESSURE: 80 MMHG | SYSTOLIC BLOOD PRESSURE: 129 MMHG | HEART RATE: 85 BPM | BODY MASS INDEX: 27.94 KG/M2

## 2023-01-23 DIAGNOSIS — G47.10 HYPERSOMNIA: Primary | ICD-10-CM

## 2023-01-23 DIAGNOSIS — G47.9 SLEEP DISTURBANCES: ICD-10-CM

## 2023-01-23 DIAGNOSIS — R51.9 MORNING HEADACHE: ICD-10-CM

## 2023-01-23 PROCEDURE — G0463 HOSPITAL OUTPT CLINIC VISIT: HCPCS

## 2023-01-23 RX ORDER — ZOLPIDEM TARTRATE 5 MG/1
5 TABLET ORAL NIGHTLY PRN
Qty: 1 TABLET | Refills: 0 | Status: SHIPPED | OUTPATIENT
Start: 2023-01-23

## 2023-01-23 NOTE — PROGRESS NOTES
[facility]  Telephone: [phone] / Fax: [phone] [place]  Telephone: [phone] / Fax: [phone] Chanell Hayes    Referring Physician: Bobby Salinas DO  PCP: Bobby Salinas DO    Reason for consult:  sleep apnea    Sigrid Chen is a 77 y.o.female  was seen in the Sleep Disorders Center today for evaluation of sleep apnea. She was referred for poor sleep quality. She has an internal patch for bowel collection. Patch needs to be emptied daily. She does not have a large intestine. She is unaware of snoring or apneas. She wakes up feeling tired and feels very sleepy/tired during the day. She has morning headaches. She is a mouth breather.  reports possible abnormal breathing pattern. Her sleep schedule is 10pm-7am.     SH-retired [employer], 4 glasses of alcohol per week, 1 caffeine per day.    ROS+persistent hoarseness, +myalgias, +anxiety, +excessive thirst, +cold intolerance.      Sigrid Chen  has a past medical history of Altered bowel elimination due to intestinal ostomy (HCC), Anemia, Asthma, Cataracts, bilateral, Extremity pain, GERD (gastroesophageal reflux disease), Herpes simplex, History of DVT (deep vein thrombosis), History of MRSA infection (1980), History of transfusion, History of UTI (04/06/2022), Hypertension, Low back pain, Lumbosacral disc disease, Migraine, Prediabetes, Spinal stenosis, lumbar, and Ulcerative colitis ([facility]).    Current Medications:    Current Outpatient Medications:   •  acetaminophen (TYLENOL) 325 MG tablet, As Needed., Disp: , Rfl:   •  amLODIPine (NORVASC) 2.5 MG tablet, Take 1 tablet by mouth Daily., Disp: 90 tablet, Rfl: 1  •  Cranberry-Vitamin C-Vitamin E 4200-20-3 MG-MG-UNIT capsule, Take 1 tablet by mouth Daily. HOLDING FOR SURGERY, Disp: , Rfl:   •  diphenhydrAMINE (BENADRYL) 25 mg capsule, Take 25 mg by mouth At Night As Needed., Disp: , Rfl:   •  gabapentin (NEURONTIN) 100 MG capsule, , Disp: , Rfl:   •  ibuprofen  "(ADVIL,MOTRIN) 400 MG tablet, Take 400 mg by mouth Every 6 (Six) Hours As Needed for Mild Pain . HOLD PRIOR TO SURGERY, Disp: , Rfl:   •  loratadine (CLARITIN) 10 MG tablet, Take 10 mg by mouth Daily., Disp: , Rfl:   •  multivitamin with minerals tablet tablet, Take  by mouth., Disp: , Rfl:   •  neomycin-polymyxin-hydrocortisone (CORTISPORIN) 3.5-54134-9 otic solution, Administer 3 drops to the right ear 4 (Four) Times a Day., Disp: 10 mL, Rfl: 0  •  potassium chloride 10 MEQ CR tablet, Take 10 mEq by mouth Daily., Disp: , Rfl:   •  valACYclovir (VALTREX) 1000 MG tablet, As Needed., Disp: , Rfl:     I have reviewed Past Medical History, Past Surgical History, Medication List, Social History and Family History as entered in Sleep Questionnaire and EPIC.    ESS  15   Vital Signs /80   Pulse 85   Ht 154.9 cm (61\")   Wt 67.1 kg (148 lb)   SpO2 96%   BMI 27.96 kg/m²  Body mass index is 27.96 kg/m².    General Alert and oriented. No acute distress noted   Pharynx/Throat Class IV  Mallampati airway, large tongue, no evidence of redundant lateral pharyngeal tissue. No oral lesions. No thrush. Moist mucous membranes.   Head Normocephalic. Symmetrical. Atraumatic.    Nose No septal deviation. No drainage   Chest Wall Normal shape. Symmetric expansion with respiration. No tenderness.   Neck Trachea midline, no thyromegaly or adenopathy    Lungs Clear to auscultation bilaterally. No wheezes. No rhonchi. No rales. Respirations regular, even and unlabored.   Heart Regular rhythm and normal rate. Normal S1 and S2. No murmur   Abdomen Soft, non-tender and non-distended. Normal bowel sounds. No masses.   Extremities Moves all extremities well. No edema   Psychiatric Normal mood and affect.        Impression:  1. Hypersomnia    2. Morning headache    3. Sleep disturbances          Plan:  I discussed the pathophysiology of obstructive sleep apnea with the patient.  We discussed the adverse outcomes associated with untreated " sleep-disordered breathing.  We discussed treatment modalities of obstructive sleep apnea including CPAP device. Sleep study will be scheduled to establish a definitive diagnosis of sleep disorder breathing.  Weight loss will be strongly beneficial in order to reduce the severity of sleep-disordered breathing.  Patient has narrow oropharyngeal structure.  Caution during activities that require prolonged concentration is strongly advised.  After sleep study results are available, patient will be notified, and appointment will be scheduled to discuss sleep study results and treatment recommendations.    Rx for Ambien 5mg x 1 was provided for in lab polysomnogram. Patient was instructed to bring the Ambien tablet to the sleep lab and take at lights out . Strict instructions were given to NOT take the Ambien at home.    Instructions for the night sleep tech  It is permitted to use zolpidem 5 mg prior to 1 AM.  If patient has Obstructive Sleep Apnea on diagnostic portion with AHI > 5 please do titration with CPAP and/or BIPAP per sleep disorder center policy.      In lab split night PSG was scheduled.    I appreciate the opportunity to participate in this patient's care.      FAHEEM Acevedo  [facility]  Phone: [phone]      Part of this note may be an electronic transcription/translation of spoken language to printed text using the Dragon Dictation System. Some errors may exist even though the document was edited.

## 2023-03-24 ENCOUNTER — HOSPITAL ENCOUNTER (OUTPATIENT)
Dept: SLEEP MEDICINE | Facility: HOSPITAL | Age: 78
End: 2023-03-24
Payer: MEDICARE

## 2023-03-24 DIAGNOSIS — G47.10 HYPERSOMNIA: ICD-10-CM

## 2023-03-24 DIAGNOSIS — R51.9 MORNING HEADACHE: ICD-10-CM

## 2023-03-24 PROCEDURE — 95810 POLYSOM 6/> YRS 4/> PARAM: CPT

## 2023-04-14 ENCOUNTER — TELEPHONE (OUTPATIENT)
Dept: SLEEP MEDICINE | Facility: HOSPITAL | Age: 78
End: 2023-04-14
Payer: MEDICARE

## 2023-04-14 NOTE — TELEPHONE ENCOUNTER
LV for patient to call if she has questions about sleep study results, on DALE, mild PLMD. If she would like mirapex she can call [facility] or request through PCP.

## 2023-05-19 ENCOUNTER — OFFICE VISIT (OUTPATIENT)
Dept: INTERNAL MEDICINE | Facility: CLINIC | Age: 78
End: 2023-05-19
Payer: MEDICARE

## 2023-05-19 VITALS
HEART RATE: 75 BPM | OXYGEN SATURATION: 98 % | BODY MASS INDEX: 28.32 KG/M2 | HEIGHT: 61 IN | TEMPERATURE: 97.8 F | SYSTOLIC BLOOD PRESSURE: 122 MMHG | DIASTOLIC BLOOD PRESSURE: 80 MMHG | WEIGHT: 150 LBS

## 2023-05-19 DIAGNOSIS — R25.2 LEG CRAMP: ICD-10-CM

## 2023-05-19 DIAGNOSIS — R20.9 COLD FEET: ICD-10-CM

## 2023-05-19 DIAGNOSIS — N30.90 CYSTITIS: Primary | ICD-10-CM

## 2023-05-19 LAB
BILIRUB BLD-MCNC: NEGATIVE MG/DL
CLARITY, POC: CLEAR
COLOR UR: YELLOW
EXPIRATION DATE: ABNORMAL
GLUCOSE UR STRIP-MCNC: NEGATIVE MG/DL
KETONES UR QL: NEGATIVE
LEUKOCYTE EST, POC: ABNORMAL
Lab: ABNORMAL
NITRITE UR-MCNC: NEGATIVE MG/ML
PH UR: 6.5 [PH] (ref 5–8)
PROT UR STRIP-MCNC: NEGATIVE MG/DL
RBC # UR STRIP: ABNORMAL /UL
SP GR UR: 1 (ref 1–1.03)
UROBILINOGEN UR QL: ABNORMAL

## 2023-05-19 PROCEDURE — 3079F DIAST BP 80-89 MM HG: CPT | Performed by: STUDENT IN AN ORGANIZED HEALTH CARE EDUCATION/TRAINING PROGRAM

## 2023-05-19 PROCEDURE — 81003 URINALYSIS AUTO W/O SCOPE: CPT | Performed by: STUDENT IN AN ORGANIZED HEALTH CARE EDUCATION/TRAINING PROGRAM

## 2023-05-19 PROCEDURE — 99213 OFFICE O/P EST LOW 20 MIN: CPT | Performed by: STUDENT IN AN ORGANIZED HEALTH CARE EDUCATION/TRAINING PROGRAM

## 2023-05-19 PROCEDURE — 3074F SYST BP LT 130 MM HG: CPT | Performed by: STUDENT IN AN ORGANIZED HEALTH CARE EDUCATION/TRAINING PROGRAM

## 2023-05-19 RX ORDER — NITROFURANTOIN 25; 75 MG/1; MG/1
100 CAPSULE ORAL 2 TIMES DAILY
Qty: 10 CAPSULE | Refills: 0 | Status: SHIPPED | OUTPATIENT
Start: 2023-05-19 | End: 2023-05-24

## 2023-05-19 NOTE — PROGRESS NOTES
"  Bobby Salinas D.O.  Internal Medicine  [facility]  [address], [address]  [place] [address]  [phone]      Chief Complaint  Urinary Tract Infection (Leg cramps)    SUBJECTIVE    History of Present Illness    Sigrid Chen is a 78 y.o. female who presents to the office today as an established patient that last saw me on 1/4/2023.     Three hours ago started having urinary urgency, no burning with urination as of yet. States she has been drinking less water the last few days than she normally does and this has resulted in urinary infections for her in the past. No fever.     Feels that her legs have felt colder at night and she has had to put a heating pad on it. Right sided greater than left. She reports following with vascular surgery in the past and has had repair valve on the right and a venaseal procedure. This has been going on over the last 3 or 4 days.     Also reports dealing with leg cramps if she does not drink enough water. She is wanting to see if her electrolytes are OK and is wondering if anything else can be used for leg cramps.     Allergies   Allergen Reactions   • Codeine Nausea And Vomiting     Sick to stomach   • Other      PT SAID SHE CAN'T TAKE ANY TIME RELEASE MEDICINE OR ANYTHING WITH A \"COATING\" ON IT, PT DOES NOT HAVE LARGE INTESTINES SO MEDICATION WILL NOT DISSOLVE   • Latex Rash   • Nickel Rash   • Penicillins Rash        Outpatient Medications Marked as Taking for the 5/19/23 encounter (Office Visit) with Bobby Salinas, DO   Medication Sig Dispense Refill   • acetaminophen (TYLENOL) 325 MG tablet As Needed.     • amLODIPine (NORVASC) 2.5 MG tablet Take 1 tablet by mouth Daily. 90 tablet 1   • Cranberry-Vitamin C-Vitamin E 4200-20-3 MG-MG-UNIT capsule Take 1 tablet by mouth Daily. HOLDING FOR SURGERY     • diphenhydrAMINE (BENADRYL) 25 mg capsule Take 1 capsule by mouth At Night As Needed.     • gabapentin (NEURONTIN) 100 MG capsule      • ibuprofen " "(ADVIL,MOTRIN) 400 MG tablet Take 1 tablet by mouth Every 6 (Six) Hours As Needed for Mild Pain. HOLD PRIOR TO SURGERY     • loratadine (CLARITIN) 10 MG tablet Take 1 tablet by mouth Daily.     • multivitamin with minerals tablet tablet Take  by mouth.     • neomycin-polymyxin-hydrocortisone (CORTISPORIN) 3.5-80892-8 otic solution Administer 3 drops to the right ear 4 (Four) Times a Day. 10 mL 0   • potassium chloride 10 MEQ CR tablet Take 1 tablet by mouth Daily.     • valACYclovir (VALTREX) 1000 MG tablet As Needed.     • zolpidem (Ambien) 5 MG tablet Take 1 tablet by mouth At Night As Needed for Sleep (Bring to the sleep lab, do not take at home). 1 tablet 0        Past Medical History:   Diagnosis Date   • Altered bowel elimination due to intestinal ostomy     MARTINEZ CONTINENT INTESTINAL RESERVIOR-INTUBED W/CATH RLQ (LARGE INTESTINE REMOVED)   • Anemia    • Asthma     NO CURRENT INHALERS   • Cataracts, bilateral    • Extremity pain    • GERD (gastroesophageal reflux disease)    • Herpes simplex    • History of DVT (deep vein thrombosis)     SUPERFICIAL RLE; DID NOT REQUIRE ANTICOAGULATION.  SAW DR. VLADIMIR MELGOZA   • History of MRSA infection 1980   • History of transfusion    • History of UTI 04/06/2022    recurrent, ESBL   • Hypertension    • Low back pain    • Lumbosacral disc disease    • Migraine    • Prediabetes    • Spinal stenosis, lumbar    • Ulcerative colitis     COLECTOMY LARGE INTESTINE       OBJECTIVE    Vital Signs:   /80   Pulse 75   Temp 97.8 °F (36.6 °C) (Infrared)   Ht 154.9 cm (61\")   Wt 68 kg (150 lb)   SpO2 98%   BMI 28.34 kg/m²     Physical Exam  Vitals reviewed.   Constitutional:       General: She is not in acute distress.     Appearance: She is not ill-appearing.   HENT:      Head: Atraumatic.   Eyes:      General: No scleral icterus.  Pulmonary:      Effort: Pulmonary effort is normal. No respiratory distress.   Abdominal:      General: Bowel sounds are normal. There is no " distension.      Palpations: Abdomen is soft.      Tenderness: There is no abdominal tenderness. There is no right CVA tenderness, left CVA tenderness or guarding.   Musculoskeletal:      Right lower leg: No edema.      Left lower leg: No edema.   Skin:     Coloration: Skin is not jaundiced.   Neurological:      Mental Status: She is alert.   Psychiatric:         Mood and Affect: Mood normal.         Behavior: Behavior normal.         Thought Content: Thought content normal.                             ASSESSMENT & PLAN     Diagnoses and all orders for this visit:    1. Cystitis (Primary)  -Three hours ago started having urinary urgency, no burning with urination as of yet. States she has been drinking less water the last few days than she normally does and this has resulted in urinary infections for her in the past. No fever.   -POC UA with positive leuk esterase and blood  -will send for culture  -she is afebrile, normotensive on exam  -will begin 5 day treatment with Macrobid, adjust regimen based on sensitivities  -educated pt on signs of worsening UTI including back pain, fever/chills despite being on antibiotic therapy, confusion and that these would be reasons to go to the ER   -     POCT urinalysis dipstick, automated  -     Urine Culture - Urine, Urine, Clean Catch  -     nitrofurantoin, macrocrystal-monohydrate, (Macrobid) 100 MG capsule; Take 1 capsule by mouth 2 (Two) Times a Day for 5 days.  Dispense: 10 capsule; Refill: 0    2. Leg cramp  -Also reports dealing with leg cramps if she does not drink enough water. She is wanting to see if her electrolytes are OK and is wondering if anything else can be used for leg cramps.   -will check BMP, Mg level  -recommended OTC magnesium and B complex supplement which in some cases can help with cramps  -overall seems well controlled however if she has adequate water intake which I encouraged   -     Basic metabolic panel  -     Magnesium    3. Cold feet        -Feels that her legs have felt colder at night and she has had to put a heating pad on it. Right sided greater than left. She reports following with vascular surgery in the past and has had repair valve on the right and a venaseal procedure. This has been going on over the last 3 or 4 days.   -strong PT and DP pulses bilaterally today, feet are warm and well perfused  -may be related to recent ablation/epidural injections and she is going to check with her pain management   -if this does not resolve I also recommended she reach out to her vascular specialist but at this time her lower limbs seem well perfused         The following social determinates of health impact the patient's medical decision making: No social determinates of health were factored in to today's visit.     Follow Up  No follow-ups on file.    Patient/family had no further questions at this time and verbalized understanding of the plan discussed today.

## 2023-05-23 LAB
BACTERIA UR CULT: ABNORMAL
BACTERIA UR CULT: ABNORMAL
BUN SERPL-MCNC: 10 MG/DL (ref 8–23)
BUN/CREAT SERPL: 14.5 (ref 7–25)
CALCIUM SERPL-MCNC: 10.2 MG/DL (ref 8.6–10.5)
CHLORIDE SERPL-SCNC: 96 MMOL/L (ref 98–107)
CO2 SERPL-SCNC: 24.8 MMOL/L (ref 22–29)
CREAT SERPL-MCNC: 0.69 MG/DL (ref 0.57–1)
EGFRCR SERPLBLD CKD-EPI 2021: 89 ML/MIN/1.73
GLUCOSE SERPL-MCNC: 116 MG/DL (ref 65–99)
MAGNESIUM SERPL-MCNC: 2.1 MG/DL (ref 1.6–2.4)
OTHER ANTIBIOTIC SUSC ISLT: ABNORMAL
POTASSIUM SERPL-SCNC: 3.8 MMOL/L (ref 3.5–5.2)
SODIUM SERPL-SCNC: 132 MMOL/L (ref 136–145)

## 2023-06-07 ENCOUNTER — OFFICE VISIT (OUTPATIENT)
Dept: INTERNAL MEDICINE | Facility: CLINIC | Age: 78
End: 2023-06-07
Payer: MEDICARE

## 2023-06-07 VITALS
SYSTOLIC BLOOD PRESSURE: 132 MMHG | OXYGEN SATURATION: 96 % | HEIGHT: 61 IN | WEIGHT: 147 LBS | BODY MASS INDEX: 27.75 KG/M2 | HEART RATE: 92 BPM | DIASTOLIC BLOOD PRESSURE: 80 MMHG

## 2023-06-07 DIAGNOSIS — G47.00 INSOMNIA, UNSPECIFIED TYPE: ICD-10-CM

## 2023-06-07 DIAGNOSIS — H81.12 BPPV (BENIGN PAROXYSMAL POSITIONAL VERTIGO), LEFT: ICD-10-CM

## 2023-06-07 DIAGNOSIS — F41.1 GAD (GENERALIZED ANXIETY DISORDER): ICD-10-CM

## 2023-06-07 DIAGNOSIS — I10 ESSENTIAL HYPERTENSION: Primary | ICD-10-CM

## 2023-06-07 PROCEDURE — 99214 OFFICE O/P EST MOD 30 MIN: CPT | Performed by: STUDENT IN AN ORGANIZED HEALTH CARE EDUCATION/TRAINING PROGRAM

## 2023-06-07 PROCEDURE — 3075F SYST BP GE 130 - 139MM HG: CPT | Performed by: STUDENT IN AN ORGANIZED HEALTH CARE EDUCATION/TRAINING PROGRAM

## 2023-06-07 PROCEDURE — 3079F DIAST BP 80-89 MM HG: CPT | Performed by: STUDENT IN AN ORGANIZED HEALTH CARE EDUCATION/TRAINING PROGRAM

## 2023-06-07 RX ORDER — MAGNESIUM OXIDE/MAG AA CHELATE 300 MG
CAPSULE ORAL
COMMUNITY

## 2023-06-07 NOTE — PROGRESS NOTES
"  Bobby Salinas D.O.  Internal Medicine  [facility]  [address], [address]  [place] [address]  [phone]      Chief Complaint  Hypertension    SUBJECTIVE    History of Present Illness    Sigrid Chen is a 78 y.o. female who presents to the office today as an established patient that last saw me on 5/19/2023.     HTN: taking amlodipine 2.5 mg once daily, doesn't check BP at home.     States she has \"dizzy spells\" , seemed to happen today after bending over and raising her head back up. She was dizzy all day three days ago. States this has overall been going on for around a month. She describes the sensation as a feeling that she could fall but not passing out.     Using over the counter \"instasleep\" which has melatonin. Her issue is staying asleep primarily , feels that her leg cramps or chronic back pain causes issues. States she feels \"edgy, impatient. Sometimes I wish I had something\". States \"I worry all the time, I know I worry too much\".     Allergies   Allergen Reactions    Codeine Nausea And Vomiting     Sick to stomach    Other      PT SAID SHE CAN'T TAKE ANY TIME RELEASE MEDICINE OR ANYTHING WITH A \"COATING\" ON IT, PT DOES NOT HAVE LARGE INTESTINES SO MEDICATION WILL NOT DISSOLVE    Latex Rash    Nickel Rash    Penicillins Rash        Outpatient Medications Marked as Taking for the 6/7/23 encounter (Office Visit) with Bobby Salinas, DO   Medication Sig Dispense Refill    acetaminophen (TYLENOL) 325 MG tablet As Needed.      amLODIPine (NORVASC) 2.5 MG tablet Take 1 tablet by mouth Daily. 90 tablet 1    B Complex Vitamins (B COMPLEX 1 PO) Take  by mouth.      Cranberry-Vitamin C-Vitamin E 4200-20-3 MG-MG-UNIT capsule Take 1 tablet by mouth Daily. HOLDING FOR SURGERY      diphenhydrAMINE (BENADRYL) 25 mg capsule Take 1 capsule by mouth At Night As Needed.      gabapentin (NEURONTIN) 100 MG capsule       ibuprofen (ADVIL,MOTRIN) 400 MG tablet Take 1 tablet by mouth Every 6 " "(Six) Hours As Needed for Mild Pain. HOLD PRIOR TO SURGERY      loratadine (CLARITIN) 10 MG tablet Take 1 tablet by mouth Daily.      Magnesium 300 MG capsule Take  by mouth.      multivitamin with minerals tablet tablet Take  by mouth.      neomycin-polymyxin-hydrocortisone (CORTISPORIN) 3.5-97978-1 otic solution Administer 3 drops to the right ear 4 (Four) Times a Day. 10 mL 0    potassium chloride 10 MEQ CR tablet Take 1 tablet by mouth Daily.      valACYclovir (VALTREX) 1000 MG tablet As Needed.      zolpidem (Ambien) 5 MG tablet Take 1 tablet by mouth At Night As Needed for Sleep (Bring to the sleep lab, do not take at home). 1 tablet 0        Past Medical History:   Diagnosis Date    Altered bowel elimination due to intestinal ostomy     MARTINEZ CONTINENT INTESTINAL RESERVIOR-INTUBED W/CATH RLQ (LARGE INTESTINE REMOVED)    Anemia     Asthma     NO CURRENT INHALERS    Cataracts, bilateral     Extremity pain     GERD (gastroesophageal reflux disease)     Herpes simplex     History of DVT (deep vein thrombosis)     SUPERFICIAL RLE; DID NOT REQUIRE ANTICOAGULATION.  SAW DR. VLADIMIR MELGOZA    History of MRSA infection 1980    History of transfusion     History of UTI 04/06/2022    recurrent, ESBL    Hypertension     Low back pain     Lumbosacral disc disease     Migraine     Prediabetes     Spinal stenosis, lumbar     Ulcerative colitis     COLECTOMY LARGE INTESTINE       OBJECTIVE    Vital Signs:   /80   Pulse 92   Ht 154.9 cm (61\")   Wt 66.7 kg (147 lb)   SpO2 96%   BMI 27.78 kg/m²     Physical Exam  Vitals reviewed.   Constitutional:       General: She is not in acute distress.     Appearance: Normal appearance. She is not ill-appearing.   HENT:      Head: Normocephalic and atraumatic.      Right Ear: Tympanic membrane, ear canal and external ear normal. There is no impacted cerumen.      Left Ear: Tympanic membrane, ear canal and external ear normal. There is no impacted cerumen.      Ears:      " "Comments: +ada hallpike on the left  Eyes:      General: No scleral icterus.  Cardiovascular:      Rate and Rhythm: Normal rate and regular rhythm.      Heart sounds: Normal heart sounds. No murmur heard.  Pulmonary:      Effort: Pulmonary effort is normal. No respiratory distress.      Breath sounds: Normal breath sounds. No wheezing or rales.   Skin:     Coloration: Skin is not jaundiced.   Neurological:      Mental Status: She is alert.   Psychiatric:         Mood and Affect: Mood normal.         Behavior: Behavior normal.         Thought Content: Thought content normal.                           ASSESSMENT & PLAN     Diagnoses and all orders for this visit:    1. Essential hypertension (Primary)  -taking amlodipine 2.5 mg once daily, doesn't check BP at home.   -/80 in office today; overall acceptable, continue current regimen  -kidney function check is up to date  Lab Results   Component Value Date    GLUCOSE 116 (H) 05/19/2023    CALCIUM 10.2 05/19/2023     (L) 05/19/2023    K 3.8 05/19/2023    CO2 24.8 05/19/2023    CL 96 (L) 05/19/2023    BUN 10 05/19/2023    CREATININE 0.69 05/19/2023    EGFRRESULT 89.0 05/19/2023    EGFR 93.3 10/07/2022    BCR 14.5 05/19/2023    ANIONGAP 8.5 10/07/2022       2. BPPV (benign paroxysmal positional vertigo), left  -States she has \"dizzy spells\" , seemed to happen today after bending over and raising her head back up. She was dizzy all day three days ago. States this has overall been going on for around a month. She describes the sensation as a feeling that she could fall but not passing out.   -pos Milan Hallpike on the left today and she also reports dizziness when bending over to look into her purse today  -explained the mechanism of BPPV and offered referral to vestibular rehab which she accepted  -if no improvement with vestibular rehab she should report back to office  -     Ambulatory Referral to Physical Therapy Evaluate and treat    3. KENNY (generalized " "anxiety disorder)  4. Insomnia, unspecified type  -Using over the counter \"instasleep\" which has melatonin. Her issue is staying asleep primarily , feels that her leg cramps or chronic back pain causes issues. States she feels \"edgy, impatient. Sometimes I wish I had something\". States \"I worry all the time, I know I worry too much\".   -denies depression symptoms; GAD7 of 12 in office today  -overall anxiety is a new diagnosis  -Stressed the importance of mental health counseling in treatment of anxiety. Provided pt a listing of in person and online mental health providers and strongly encouraged they reach out to make an appointment.   -also introduced pharmacology therapy . Discussed SSRIs as the preferred first line agents. Discussed common SSRI side effects including nausea/constipation/diarrhea especially at the beginning of treatment , change in sleep, decreased sex drive, potential for weight gain, headache. Also discussed the very small increased stroke risk with older patients. Discussed that most patients take the medication without side effect. At this point she declines therapy but will consider medications in the future and will work on some mindfulness and perhaps mental health counseling.               The following social determinates of health impact the patient's medical decision making: No social determinates of health were factored in to today's visit.     Follow Up  Return in about 4 months (around 10/7/2023) for Medicare Wellness.    Patient/family had no further questions at this time and verbalized understanding of the plan discussed today.   "

## 2023-09-13 ENCOUNTER — TELEMEDICINE (OUTPATIENT)
Dept: INTERNAL MEDICINE | Facility: CLINIC | Age: 78
End: 2023-09-13

## 2023-09-13 DIAGNOSIS — I10 ESSENTIAL HYPERTENSION: Primary | ICD-10-CM

## 2023-10-02 DIAGNOSIS — M51.36 DDD (DEGENERATIVE DISC DISEASE), LUMBAR: Primary | ICD-10-CM

## 2023-12-22 ENCOUNTER — TELEPHONE (OUTPATIENT)
Dept: PEDIATRICS | Facility: OTHER | Age: 78
End: 2023-12-22
Payer: MEDICARE

## 2023-12-22 NOTE — TELEPHONE ENCOUNTER
Caller: Sigrid Chen    Relationship: Self    Best call back number:[phone]  What orders are you requesting (i.e. lab or imaging): ORDERS     In what timeframe would the patient need to come in: BEFORE APPOINTMENT ON 12/29     Where will you receive your lab/imaging services: [facility]

## 2023-12-25 DIAGNOSIS — M85.80 OSTEOPENIA, UNSPECIFIED LOCATION: ICD-10-CM

## 2023-12-25 DIAGNOSIS — I10 ESSENTIAL HYPERTENSION: Primary | ICD-10-CM

## 2023-12-25 DIAGNOSIS — R73.03 PREDIABETES: ICD-10-CM

## 2023-12-25 DIAGNOSIS — E78.00 PURE HYPERCHOLESTEROLEMIA: ICD-10-CM

## 2023-12-28 LAB
25(OH)D3+25(OH)D2 SERPL-MCNC: 77.5 NG/ML (ref 30–100)
ALBUMIN SERPL-MCNC: 4.3 G/DL (ref 3.5–5.2)
ALBUMIN/GLOB SERPL: 1.7 G/DL
ALP SERPL-CCNC: 55 U/L (ref 39–117)
ALT SERPL-CCNC: 18 U/L (ref 1–33)
AST SERPL-CCNC: 25 U/L (ref 1–32)
BASOPHILS # BLD AUTO: 0.1 10*3/MM3 (ref 0–0.2)
BASOPHILS NFR BLD AUTO: 1.8 % (ref 0–1.5)
BILIRUB SERPL-MCNC: 0.4 MG/DL (ref 0–1.2)
BUN SERPL-MCNC: 10 MG/DL (ref 8–23)
BUN/CREAT SERPL: 11.5 (ref 7–25)
CALCIUM SERPL-MCNC: 9.6 MG/DL (ref 8.6–10.5)
CHLORIDE SERPL-SCNC: 102 MMOL/L (ref 98–107)
CHOLEST SERPL-MCNC: 183 MG/DL (ref 0–200)
CO2 SERPL-SCNC: 27.1 MMOL/L (ref 22–29)
CREAT SERPL-MCNC: 0.87 MG/DL (ref 0.57–1)
EGFRCR SERPLBLD CKD-EPI 2021: 68.3 ML/MIN/1.73
EOSINOPHIL # BLD AUTO: 0.26 10*3/MM3 (ref 0–0.4)
EOSINOPHIL NFR BLD AUTO: 4.8 % (ref 0.3–6.2)
ERYTHROCYTE [DISTWIDTH] IN BLOOD BY AUTOMATED COUNT: 13.3 % (ref 12.3–15.4)
GLOBULIN SER CALC-MCNC: 2.6 GM/DL
GLUCOSE SERPL-MCNC: 94 MG/DL (ref 65–99)
HBA1C MFR BLD: 5.7 % (ref 4.8–5.6)
HCT VFR BLD AUTO: 36.9 % (ref 34–46.6)
HDLC SERPL-MCNC: 55 MG/DL (ref 40–60)
HGB BLD-MCNC: 12.2 G/DL (ref 12–15.9)
IMM GRANULOCYTES # BLD AUTO: 0.01 10*3/MM3 (ref 0–0.05)
IMM GRANULOCYTES NFR BLD AUTO: 0.2 % (ref 0–0.5)
LDLC SERPL CALC-MCNC: 110 MG/DL (ref 0–100)
LYMPHOCYTES # BLD AUTO: 2.1 10*3/MM3 (ref 0.7–3.1)
LYMPHOCYTES NFR BLD AUTO: 38.8 % (ref 19.6–45.3)
MCH RBC QN AUTO: 29 PG (ref 26.6–33)
MCHC RBC AUTO-ENTMCNC: 33.1 G/DL (ref 31.5–35.7)
MCV RBC AUTO: 87.6 FL (ref 79–97)
MONOCYTES # BLD AUTO: 0.54 10*3/MM3 (ref 0.1–0.9)
MONOCYTES NFR BLD AUTO: 10 % (ref 5–12)
NEUTROPHILS # BLD AUTO: 2.4 10*3/MM3 (ref 1.7–7)
NEUTROPHILS NFR BLD AUTO: 44.4 % (ref 42.7–76)
NRBC BLD AUTO-RTO: 0 /100 WBC (ref 0–0.2)
PLATELET # BLD AUTO: 247 10*3/MM3 (ref 140–450)
POTASSIUM SERPL-SCNC: 4.1 MMOL/L (ref 3.5–5.2)
PROT SERPL-MCNC: 6.9 G/DL (ref 6–8.5)
RBC # BLD AUTO: 4.21 10*6/MM3 (ref 3.77–5.28)
SODIUM SERPL-SCNC: 138 MMOL/L (ref 136–145)
TRIGL SERPL-MCNC: 97 MG/DL (ref 0–150)
VLDLC SERPL CALC-MCNC: 18 MG/DL (ref 5–40)
WBC # BLD AUTO: 5.41 10*3/MM3 (ref 3.4–10.8)

## 2023-12-29 ENCOUNTER — OFFICE VISIT (OUTPATIENT)
Dept: INTERNAL MEDICINE | Facility: CLINIC | Age: 78
End: 2023-12-29
Payer: MEDICARE

## 2023-12-29 VITALS
HEART RATE: 106 BPM | HEIGHT: 60 IN | WEIGHT: 146 LBS | OXYGEN SATURATION: 95 % | DIASTOLIC BLOOD PRESSURE: 82 MMHG | BODY MASS INDEX: 28.66 KG/M2 | SYSTOLIC BLOOD PRESSURE: 126 MMHG

## 2023-12-29 DIAGNOSIS — R73.03 PREDIABETES: ICD-10-CM

## 2023-12-29 DIAGNOSIS — Z00.00 MEDICARE ANNUAL WELLNESS VISIT, SUBSEQUENT: Primary | ICD-10-CM

## 2023-12-29 DIAGNOSIS — E78.00 PURE HYPERCHOLESTEROLEMIA: ICD-10-CM

## 2023-12-29 NOTE — PROGRESS NOTES
"The ABCs of the Annual Wellness Visit  Subsequent Medicare Wellness Visit    Subjective      Sigrid Chen is a 78 y.o. female who presents for a Subsequent Medicare Wellness Visit.    The following portions of the patient's history were reviewed and   updated as appropriate: allergies, current medications, past family history, past medical history, past social history, past surgical history, and problem list.    Prediabetes: doesn't eat many pastas, two pieces of bread per day. She has a light dessert sometimes. She exercises with leg stretching but not doing as much walking aside from going up and down stairs.   Lab Results   Component Value Date    HGBA1C 5.70 (H) 12/27/2023     Hyperlipidemia: has declined statin for primary prevention in the past. Now aged out of primary prevention. States she doesn't eat fast foods.    Migraine: No medication needed, no migraine in years.      Genital herpes: takes valtrex 500 mg if noticing a flare.      Ulcerative colitis: follows with Lizzette Castro M.D. with [facility] colorectal surgery. She has had continent cecostomy.     Arthritis: mainly in her back, previously received epidurals with Dr Miranda pain management but states this has stopped due to ineffectiveness of treatment. She is getting established with a spine surgeon in the new year.  States she has had a spinal stenosis surgery in 2016. She takes ibuprofen or Tylenol nightly. She takes gabapentin 100 mg tablets up to four tablets daily, prescribed by pain management.      Allergies: takes over the counter antihistamine daily and benadryl only if needed      KENNY: has declined medication in the past , feels that anxiety has improved but \"I'm that kind of person\" in regards to always having anxiety.     HTN: taking amlodipine 2.5 mg once daily, doesn't check BP at home.     Osteopenia: takes vitamin D supplement.     Leg cramps: takes magnesium     Compared to one year ago, the patient feels her physical   health is " "the same. \"It's OK\"    Compared to one year ago, the patient feels her mental   health is the same. \"It's OK\"    Recent Hospitalizations:  She was not admitted to the hospital during the last year.       Current Medical Providers:  Patient Care Team:  Bobby Salinas DO as PCP - General (Internal Medicine)    Outpatient Medications Prior to Visit   Medication Sig Dispense Refill    acetaminophen (TYLENOL) 325 MG tablet As Needed.      amLODIPine (NORVASC) 2.5 MG tablet TAKE 1 TABLET BY MOUTH DAILY 90 tablet 1    B Complex Vitamins (B COMPLEX 1 PO) Take  by mouth.      Cranberry-Vitamin C-Vitamin E 4200-20-3 MG-MG-UNIT capsule Take 1 tablet by mouth Daily. HOLDING FOR SURGERY      diphenhydrAMINE (BENADRYL) 25 mg capsule Take 1 capsule by mouth At Night As Needed.      gabapentin (NEURONTIN) 100 MG capsule Take 1 capsule by mouth 4 (Four) Times a Day As Needed (pain).      ibuprofen (ADVIL,MOTRIN) 400 MG tablet Take 1 tablet by mouth Every 6 (Six) Hours As Needed for Mild Pain. HOLD PRIOR TO SURGERY      loratadine (CLARITIN) 10 MG tablet Take 1 tablet by mouth Daily.      Magnesium 300 MG capsule Take  by mouth.      multivitamin with minerals tablet tablet Take  by mouth.      neomycin-polymyxin-hydrocortisone (CORTISPORIN) 3.5-85954-4 otic solution Administer 3 drops to the right ear 4 (Four) Times a Day. 10 mL 0    valACYclovir (VALTREX) 1000 MG tablet As Needed.      potassium chloride 10 MEQ CR tablet Take 1 tablet by mouth Daily. (Patient not taking: Reported on 12/29/2023)      zolpidem (Ambien) 5 MG tablet Take 1 tablet by mouth At Night As Needed for Sleep (Bring to the sleep lab, do not take at home). 1 tablet 0     No facility-administered medications prior to visit.       No opioid medication identified on active medication list. I have reviewed chart for other potential  high risk medication/s and harmful drug interactions in the elderly.        Aspirin is not on active medication list.  Aspirin use is not " "indicated based on review of current medical condition/s. Risk of harm outweighs potential benefits.  .    Patient Active Problem List   Diagnosis    Nonfamilial nocturnal leg cramps    Atypical migraine    Vitamin D deficiency    Cervicogenic headache    Essential hypertension    Gastroesophageal reflux disease    Cervico-occipital neuralgia    Congenital pes planus    Intestinal malabsorption    History of UTI    Urinary tract infectious disease    Chest pain    Frequent urinary tract infections    Heartburn    Hx of blood clots    Migraine without aura and without status migrainosus, not intractable    Tear of right rotator cuff     Advance Care Planning   Advance Care Planning     Advance Directive is on file.  ACP discussion was held with the patient during this visit. Patient has an advance directive in EMR which is still valid.      Objective    Vitals:    12/29/23 1111   BP: 126/82   Pulse: 106   SpO2: 95%   Weight: 66.2 kg (146 lb)   Height: 152.4 cm (60\")     Physical Exam  Vitals reviewed.   Constitutional:       General: She is not in acute distress.     Appearance: Normal appearance. She is not ill-appearing.   HENT:      Head: Atraumatic.   Eyes:      General: No scleral icterus.  Cardiovascular:      Rate and Rhythm: Normal rate and regular rhythm.      Heart sounds: Normal heart sounds. No murmur heard.  Pulmonary:      Effort: Pulmonary effort is normal. No respiratory distress.      Breath sounds: Normal breath sounds. No stridor. No wheezing or rhonchi.   Abdominal:      General: Bowel sounds are normal. There is no distension.      Palpations: Abdomen is soft.      Tenderness: There is no abdominal tenderness. There is no guarding.       Musculoskeletal:      Right lower leg: No edema.      Left lower leg: No edema.   Skin:     Coloration: Skin is not jaundiced.   Neurological:      Mental Status: She is alert.   Psychiatric:         Mood and Affect: Mood normal.         Behavior: Behavior " "normal.         Thought Content: Thought content normal.           Estimated body mass index is 28.51 kg/m² as calculated from the following:    Height as of this encounter: 152.4 cm (60\").    Weight as of this encounter: 66.2 kg (146 lb).           Does the patient have evidence of cognitive impairment?   No    Lab Results   Component Value Date    CHLPL 183 2023    TRIG 97 2023    HDL 55 2023     (H) 2023    VLDL 18 2023    HGBA1C 5.70 (H) 2023          HEALTH RISK ASSESSMENT    Smoking Status:  Social History     Tobacco Use   Smoking Status Never   Smokeless Tobacco Never     Alcohol Consumption:  Social History     Substance and Sexual Activity   Alcohol Use Yes    Alcohol/week: 2.0 standard drinks of alcohol    Types: 2 Glasses of wine per week     Fall Risk Screen:    LUPE Fall Risk Assessment was completed, and patient is at LOW risk for falls.Assessment completed on:2023    Depression Screenin/29/2023    11:19 AM   PHQ-2/PHQ-9 Depression Screening   Little Interest or Pleasure in Doing Things 0-->not at all   Feeling Down, Depressed or Hopeless 0-->not at all   PHQ-9: Brief Depression Severity Measure Score 0       Health Habits and Functional and Cognitive Screenin/29/2023    11:16 AM   Functional & Cognitive Status   Do you have difficulty preparing food and eating? No   Do you have difficulty bathing yourself, getting dressed or grooming yourself? No   Do you have difficulty using the toilet? No   Do you have difficulty moving around from place to place? No   Do you have trouble with steps or getting out of a bed or a chair? No   Current Diet Well Balanced Diet   Dental Exam Up to date   Eye Exam Up to date   Exercise (times per week) 4 times per week   Current Exercises Include Walking   Do you need help using the phone?  No   Are you deaf or do you have serious difficulty hearing?  Yes   Do you need help to go to places out of " walking distance? No   Do you need help shopping? No   Do you need help preparing meals?  No   Do you need help with housework?  No   Do you need help with laundry? No   Do you need help taking your medications? No   Do you need help managing money? No   Do you ever drive or ride in a car without wearing a seat belt? No   Who do you live with? Spouse   If you need help, do you have trouble finding someone available to you? No   Do you have difficulty concentrating, remembering or making decisions? No       Age-appropriate Screening Schedule:  Refer to the list below for future screening recommendations based on patient's age, sex and/or medical conditions. Orders for these recommended tests are listed in the plan section. The patient has been provided with a written plan.    Health Maintenance   Topic Date Due    BMI FOLLOWUP  06/07/2024    DXA SCAN  09/29/2024    LIPID PANEL  12/27/2024    ANNUAL WELLNESS VISIT  12/29/2024    TDAP/TD VACCINES (3 - Tdap) 10/02/2033    HEPATITIS C SCREENING  Completed    COVID-19 Vaccine  Completed    INFLUENZA VACCINE  Completed    Pneumococcal Vaccine 65+  Completed    ZOSTER VACCINE  Completed    COLORECTAL CANCER SCREENING  Discontinued                  CMS Preventative Services Quick Reference  Risk Factors Identified During Encounter:    Chronic Pain:  follows with pain mangement  Hearing Problem:  wears hearing aids  Inactivity/Sedentary: Patient was advised to exercise at least 150 minutes a week per CDC recommendations.  Dental Screening Recommended  Vision Screening Recommended    The above risks/problems have been discussed with the patient.  Pertinent information has been shared with the patient in the After Visit Summary.      Follow Up:   Next Medicare Wellness visit to be scheduled in 1 year.      An After Visit Summary and PPPS were made available to the patient.      A problem-based visit was also conducted on the same day, see below for assessment and  plan    Diagnoses and all orders for this visit:    1. Pure hypercholesterolemia (Primary)  2. Prediabetes  -doesn't eat many pastas, two pieces of bread per day. She has a light dessert sometimes. She exercises with leg stretching but not doing as much walking aside from going up and down stairs.   Lab Results   Component Value Date    HGBA1C 5.70 (H) 12/27/2023     Lab Results   Component Value Date    CHOL 187 10/06/2022    CHLPL 183 12/27/2023    TRIG 97 12/27/2023    HDL 55 12/27/2023     (H) 12/27/2023     -has declined statin for primary prevention in the past. Now aged out of primary prevention. States she doesn't eat fast foods.  -provided counseling regarding the importance for increased exercise and decreased portion sizes with focus on weight loss. She needs to be doing weight bearing cardio exercise at least 30 min 5 days weekly. She prefers to avoid medication therapy which I agree with given her age for primary prevention.        The following social determinates of health impact the patient's medical decision making: No social determinates of health were factored in to today's visit.     Follow Up  Return in about 6 months (around 6/29/2024) for Recheck.

## 2024-01-11 DIAGNOSIS — I10 ESSENTIAL HYPERTENSION: ICD-10-CM

## 2024-01-11 RX ORDER — AMLODIPINE BESYLATE 2.5 MG/1
2.5 TABLET ORAL DAILY
Qty: 90 TABLET | Refills: 1 | Status: SHIPPED | OUTPATIENT
Start: 2024-01-11

## 2024-03-29 ENCOUNTER — OFFICE VISIT (OUTPATIENT)
Dept: INTERNAL MEDICINE | Facility: CLINIC | Age: 79
End: 2024-03-29
Payer: MEDICARE

## 2024-03-29 VITALS
SYSTOLIC BLOOD PRESSURE: 118 MMHG | WEIGHT: 145 LBS | HEART RATE: 69 BPM | DIASTOLIC BLOOD PRESSURE: 72 MMHG | HEIGHT: 60 IN | BODY MASS INDEX: 28.47 KG/M2 | OXYGEN SATURATION: 97 %

## 2024-03-29 DIAGNOSIS — G47.00 INSOMNIA, UNSPECIFIED TYPE: Primary | ICD-10-CM

## 2024-03-29 NOTE — PROGRESS NOTES
"  Bobby Salinas D.O.  Internal Medicine  [facility]  [address], [address]  [place] [address]  [phone]      Chief Complaint  Insomnia    SUBJECTIVE    History of Present Illness    Sigrid Chen is a 79 y.o. female who presents to the office today as an established patient that last saw me on 12/29/2023.     Here today with concerns about difficulty with sleep. She feels she has had difficulty with sleep for 7-10 months. \"I've lived with it\". States her main issue is going to sleep and staying asleep. It can take her up to half an hour to fall asleep. She doesn't take naps during the day but feels really tired by 4 PM. Right now she is averaging around  5-6 hours nightly. Sometimes made worse by chronic back pain that she is having surgery for in the future (To be determined). She has tried mediation as well as sleep stories \"Calm\". She has tried melatonin which gives her dry mouth.   Overall has trouble with sleep 20/30 days per month. She states she tried a half of her friend's Xanax and it really helped.     Allergies   Allergen Reactions    Codeine Nausea And Vomiting     Sick to stomach    Other      PT SAID SHE CAN'T TAKE ANY TIME RELEASE MEDICINE OR ANYTHING WITH A \"COATING\" ON IT, PT DOES NOT HAVE LARGE INTESTINES SO MEDICATION WILL NOT DISSOLVE    Latex Rash    Nickel Rash    Penicillins Rash        Outpatient Medications Marked as Taking for the 3/29/24 encounter (Office Visit) with Bobby Salinas, DO   Medication Sig Dispense Refill    acetaminophen (TYLENOL) 325 MG tablet As Needed.      amLODIPine (NORVASC) 2.5 MG tablet TAKE 1 TABLET BY MOUTH DAILY 90 tablet 1    B Complex Vitamins (B COMPLEX 1 PO) Take  by mouth.      Cranberry-Vitamin C-Vitamin E 4200-20-3 MG-MG-UNIT capsule Take 1 tablet by mouth Daily. HOLDING FOR SURGERY      diphenhydrAMINE (BENADRYL) 25 mg capsule Take 1 capsule by mouth At Night As Needed.      gabapentin (NEURONTIN) 100 MG capsule Take 1 " "capsule by mouth 4 (Four) Times a Day As Needed (pain).      ibuprofen (ADVIL,MOTRIN) 400 MG tablet Take 1 tablet by mouth Every 6 (Six) Hours As Needed for Mild Pain. HOLD PRIOR TO SURGERY      loratadine (CLARITIN) 10 MG tablet Take 1 tablet by mouth Daily.      Magnesium 300 MG capsule Take  by mouth.      multivitamin with minerals tablet tablet Take  by mouth.      neomycin-polymyxin-hydrocortisone (CORTISPORIN) 3.5-10038-6 otic solution Administer 3 drops to the right ear 4 (Four) Times a Day. 10 mL 0    valACYclovir (VALTREX) 1000 MG tablet As Needed.          Past Medical History:   Diagnosis Date    Altered bowel elimination due to intestinal ostomy     MARTINEZ CONTINENT INTESTINAL RESERVIOR-INTUBED W/CATH RLQ (LARGE INTESTINE REMOVED)    Anemia     Asthma     NO CURRENT INHALERS    Cataracts, bilateral     Extremity pain     GERD (gastroesophageal reflux disease)     Herpes simplex     History of DVT (deep vein thrombosis)     SUPERFICIAL RLE; DID NOT REQUIRE ANTICOAGULATION.  SAW DR. VLADIMIR MELGOZA    History of MRSA infection 1980    History of transfusion     History of UTI 04/06/2022    recurrent, ESBL    Hypertension     Low back pain     Lumbosacral disc disease     Migraine     Osteopenia     Prediabetes     Spinal stenosis, lumbar     Ulcerative colitis     COLECTOMY LARGE INTESTINE       OBJECTIVE    Vital Signs:   /72   Pulse 69   Ht 152.4 cm (60\")   Wt 65.8 kg (145 lb)   SpO2 97%   BMI 28.32 kg/m²        Physical Exam  Vitals reviewed.   Constitutional:       General: She is not in acute distress.     Appearance: Normal appearance. She is not ill-appearing.   Eyes:      General: No scleral icterus.  Pulmonary:      Effort: Pulmonary effort is normal. No respiratory distress.   Skin:     Coloration: Skin is not jaundiced.   Neurological:      Mental Status: She is alert.   Psychiatric:         Mood and Affect: Mood normal.         Behavior: Behavior normal.         Thought Content: " "Thought content normal.                             ASSESSMENT & PLAN     Diagnoses and all orders for this visit:    1. Insomnia, unspecified type (Primary)    -new problem to this provider  -Here today with concerns about difficulty with sleep. She feels she has had difficulty with sleep for 7-10 months. \"I've lived with it\". States her main issue is going to sleep and staying asleep. It can take her up to half an hour to fall asleep. She doesn't take naps during the day but feels really tired by 4 PM. Right now she is averaging around  5-6 hours nightly. Sometimes made worse by chronic back pain that she is having surgery for in the future (To be determined). She has tried mediation as well as sleep stories \"Calm\". She has tried melatonin which gives her dry mouth.   Overall has trouble with sleep 20/30 days per month. She states she tried a half of her friend's Xanax and it really helped.   -overall we discussed sleep hygiene measures. Recommend she get out of bed and do not return to bed if not falling asleep within 20 minutes. I discussed the potential benefit of CBT-I therapy as well and recommend she pursue this. Provided contact info for CBT I therapy. We discussed the no medications for insomnia are approved long term. I recommended she not use medications from her friends/family as this can be dangerous. I did discuss briefly Belsomra as a medication option I would be OK trialing if necessary and she will think about it. We discussed the common side effects including day time fatigue after use. She will let me know how things progress and doesn't want a Rx for any medication at this time.         Follow Up  No follow-ups on file.    Patient/family had no further questions at this time and verbalized understanding of the plan discussed today.   "

## 2024-04-11 ENCOUNTER — OFFICE VISIT (OUTPATIENT)
Dept: INTERNAL MEDICINE | Facility: CLINIC | Age: 79
End: 2024-04-11
Payer: MEDICARE

## 2024-04-11 VITALS
OXYGEN SATURATION: 94 % | WEIGHT: 145 LBS | BODY MASS INDEX: 28.47 KG/M2 | HEART RATE: 80 BPM | HEIGHT: 60 IN | SYSTOLIC BLOOD PRESSURE: 134 MMHG | DIASTOLIC BLOOD PRESSURE: 66 MMHG

## 2024-04-11 DIAGNOSIS — D64.9 NORMOCYTIC ANEMIA: ICD-10-CM

## 2024-04-11 DIAGNOSIS — Z01.818 PREOPERATIVE CLEARANCE: Primary | ICD-10-CM

## 2024-04-11 PROCEDURE — 3075F SYST BP GE 130 - 139MM HG: CPT | Performed by: STUDENT IN AN ORGANIZED HEALTH CARE EDUCATION/TRAINING PROGRAM

## 2024-04-11 PROCEDURE — 99214 OFFICE O/P EST MOD 30 MIN: CPT | Performed by: STUDENT IN AN ORGANIZED HEALTH CARE EDUCATION/TRAINING PROGRAM

## 2024-04-11 PROCEDURE — 3078F DIAST BP <80 MM HG: CPT | Performed by: STUDENT IN AN ORGANIZED HEALTH CARE EDUCATION/TRAINING PROGRAM

## 2024-04-11 NOTE — PROGRESS NOTES
"Bobby Salinas D.O.  Internal Medicine  [facility]  [address], [address]  [place] [address]  [phone]      Chief Complaint  Surgery  clearance    SUBJECTIVE    Sigrid Chen is a 79 y.o. female who presents to the office today as an established patient that last saw me on 3/29/2024.     Pt presents to office today for preoperative evaluation.    *Planned surgical procedure and date: Right L3-L4 lumbar decompression 4/24/24  *History of coronary artery disease? No  *History of heart failure? No  *History of heart valve disorders? No  *History of diabetes? No   *History of HTN? Yes , takes amlodipine 2.5 mg daily   *History of venous thromboembolism? No, she reports only a history of superficial venous clot  *History of liver disease? No   *History of lung disease? No  *History of easy bleeding/bruising? No   *Personal history of malignant hyperthermia (postoperative fever after anesthesia) or severe fever and death in family members? No   *Known severe underlying condition: No  *Patient does not have new or worsening chest pain/pressure/tightness or shortness of air either at rest or activity.       Allergies   Allergen Reactions    Codeine Nausea And Vomiting     Sick to stomach    Other      PT SAID SHE CAN'T TAKE ANY TIME RELEASE MEDICINE OR ANYTHING WITH A \"COATING\" ON IT, PT DOES NOT HAVE LARGE INTESTINES SO MEDICATION WILL NOT DISSOLVE    Latex Rash    Nickel Rash    Penicillins Rash        Outpatient Medications Marked as Taking for the 4/11/24 encounter (Office Visit) with Bobby Salinas, DO   Medication Sig Dispense Refill    acetaminophen (TYLENOL) 325 MG tablet As Needed.      amLODIPine (NORVASC) 2.5 MG tablet TAKE 1 TABLET BY MOUTH DAILY 90 tablet 1    B Complex Vitamins (B COMPLEX 1 PO) Take  by mouth.      Cranberry-Vitamin C-Vitamin E 4200-20-3 MG-MG-UNIT capsule Take 1 tablet by mouth Daily. HOLDING FOR SURGERY      diphenhydrAMINE (BENADRYL) 25 mg capsule Take 1 " capsule by mouth At Night As Needed.      gabapentin (NEURONTIN) 100 MG capsule Take 1 capsule by mouth 4 (Four) Times a Day As Needed (pain).      ibuprofen (ADVIL,MOTRIN) 400 MG tablet Take 1 tablet by mouth Every 6 (Six) Hours As Needed for Mild Pain. HOLD PRIOR TO SURGERY      loratadine (CLARITIN) 10 MG tablet Take 1 tablet by mouth Daily.      Magnesium 300 MG capsule Take  by mouth.      multivitamin with minerals tablet tablet Take  by mouth.      neomycin-polymyxin-hydrocortisone (CORTISPORIN) 3.5-97164-4 otic solution Administer 3 drops to the right ear 4 (Four) Times a Day. 10 mL 0    valACYclovir (VALTREX) 1000 MG tablet As Needed.          Past Medical History:   Diagnosis Date    Altered bowel elimination due to intestinal ostomy     MARTINEZ CONTINENT INTESTINAL RESERVIOR-INTUBED W/CATH RLQ (LARGE INTESTINE REMOVED)    Anemia     Asthma     NO CURRENT INHALERS    Cataracts, bilateral     Extremity pain     GERD (gastroesophageal reflux disease)     Herpes simplex     History of DVT (deep vein thrombosis)     SUPERFICIAL RLE; DID NOT REQUIRE ANTICOAGULATION.  SAW DR. VLADIMIR MELGOZA    History of MRSA infection 1980    History of transfusion     History of UTI 04/06/2022    recurrent, ESBL    Hypertension     Low back pain     Lumbosacral disc disease     Migraine     Osteopenia     Prediabetes     Spinal stenosis, lumbar     Ulcerative colitis     COLECTOMY LARGE INTESTINE     Past Surgical History:   Procedure Laterality Date    APPENDECTOMY      BUNIONECTOMY Bilateral     BUNIONECTOMY Right 02/19/2018    Procedure: RT BUNIONECTOMY WITH DOUBLE OSTEOTOMY 2ND INTERPHALANGEAL ARTHROPLASTY ;  Surgeon: Karel Thomas MD;  [place] [facility] [place] [facility] [place];  Service:     CATARACT EXTRACTION, BILATERAL      CHOLECYSTECTOMY      COLONOSCOPY      CONTINENT CECOSTOMY  1980    LARGE INTESTINE REMOVED, Martinez continent Ileostomy in 1980, pouch is internal, stoma on the outside    LUMBAR DISCECTOMY FUSION INSTRUMENTATION N/A  "12/16/2016    Procedure: MICROLAMINECTOMY L3 4  L4 5 W/ METRIX;  Surgeon: Valdez Greene DO;  Location: [facility] OR;  Service:     ROTATOR CUFF REPAIR Right     VASCULAR SURGERY Right     REPAIRED VEIN RIGHT LOWER EXTREMITY REPAIRED VALVE GROIN AREA    VENOUS CLOSURE Right     VenaSeal Procedure    WISDOM TOOTH EXTRACTION      FOUR     Family History   Problem Relation Age of Onset    Thyroid disease Mother     Lung cancer Mother         cigarette use    Hyperlipidemia Mother     Dementia Mother     Alcohol abuse Father     Suicide Attempts Father     Diabetes Sister     Breast cancer Sister     Hypertension Brother     Diabetes Brother     Diabetes Brother     Heart disease Paternal Grandmother     Aortic aneurysm Other     Malig Hyperthermia Neg Hx     reports that she has never smoked. She has never used smokeless tobacco. She reports current alcohol use of about 2.0 standard drinks of alcohol per week. She reports that she does not use drugs.    OBJECTIVE    Vital Signs:   /66   Pulse 80   Ht 152.4 cm (60\")   Wt 65.8 kg (145 lb)   SpO2 94%   BMI 28.32 kg/m²     Physical Exam  Vitals reviewed.   Constitutional:       General: She is not in acute distress.     Appearance: Normal appearance. She is not ill-appearing.   Eyes:      General: No scleral icterus.  Cardiovascular:      Rate and Rhythm: Normal rate and regular rhythm.      Heart sounds: Murmur heard.      Systolic murmur is present with a grade of 2/6.   Pulmonary:      Effort: Pulmonary effort is normal. No respiratory distress.      Breath sounds: Normal breath sounds. No wheezing.   Musculoskeletal:      Right lower leg: No edema.      Left lower leg: No edema.   Skin:     Coloration: Skin is not jaundiced.   Neurological:      Mental Status: She is alert.   Psychiatric:         Mood and Affect: Mood normal.         Behavior: Behavior normal.         Thought Content: Thought content normal.                             ASSESSMENT & PLAN "     Diagnoses and all orders for this visit:    1. Preoperative clearance (Primary)  2. Normocytic anemia  -Planned surgical procedure and date: Right L3-L4 lumbar decompression 4/24/24  -This surgery is considered a Intermdiate risk procedure  -Patient's ASA Classification of Physical Status is Grade II, mild systemic disease  -RCRI calculated and is 0, 0.4% 30-day risk of death, MI, or cardiac arrest  -Smoking screening: patient does not smoke   -Alcohol screening: AUDIT-C score is consistent with normal alcohol consumption  -Recreational drug screening: None   -DALE Screening: Normal sleep study 3/2023  -Medication review completed:  *Aspirin: pt is not on aspirin  *Antihypertensive management: patient is currently taking amlodipine , continue up until day of surgery  *Anticoagulant management: patient is not on anticoagulant   *Diabetes management: not diabetic  *Stop NSAIDs 5 days prior to surgery.   -overall, advised pt that no surgical procedure is without risk for heart attack and stroke. Overall she is low risk for a intermediate risk procedure. I have reviewed EKG report from [place] 4/2/24 which was interpreted as sinus rhythm. I have reviewed labs preop from [place] dated 4/24/24. She has a mild anemia noted on CBC, Hgb 11.4. Upon recheck of CBC today, Hgb 11.4 and stable.   -At this point I have no further recommendations for additional testing before proceeding with surgery.

## 2024-04-12 ENCOUNTER — TELEPHONE (OUTPATIENT)
Dept: INTERNAL MEDICINE | Facility: CLINIC | Age: 79
End: 2024-04-12
Payer: MEDICARE

## 2024-04-12 LAB
BASOPHILS # BLD AUTO: 0.09 10*3/MM3 (ref 0–0.2)
BASOPHILS NFR BLD AUTO: 1.2 % (ref 0–1.5)
EOSINOPHIL # BLD AUTO: 0.3 10*3/MM3 (ref 0–0.4)
EOSINOPHIL NFR BLD AUTO: 4 % (ref 0.3–6.2)
ERYTHROCYTE [DISTWIDTH] IN BLOOD BY AUTOMATED COUNT: 13.4 % (ref 12.3–15.4)
HCT VFR BLD AUTO: 35.6 % (ref 34–46.6)
HGB BLD-MCNC: 11.4 G/DL (ref 12–15.9)
IMM GRANULOCYTES # BLD AUTO: 0.01 10*3/MM3 (ref 0–0.05)
IMM GRANULOCYTES NFR BLD AUTO: 0.1 % (ref 0–0.5)
LYMPHOCYTES # BLD AUTO: 2.85 10*3/MM3 (ref 0.7–3.1)
LYMPHOCYTES NFR BLD AUTO: 38.5 % (ref 19.6–45.3)
MCH RBC QN AUTO: 28.3 PG (ref 26.6–33)
MCHC RBC AUTO-ENTMCNC: 32 G/DL (ref 31.5–35.7)
MCV RBC AUTO: 88.3 FL (ref 79–97)
MONOCYTES # BLD AUTO: 0.68 10*3/MM3 (ref 0.1–0.9)
MONOCYTES NFR BLD AUTO: 9.2 % (ref 5–12)
NEUTROPHILS # BLD AUTO: 3.48 10*3/MM3 (ref 1.7–7)
NEUTROPHILS NFR BLD AUTO: 47 % (ref 42.7–76)
NRBC BLD AUTO-RTO: 0 /100 WBC (ref 0–0.2)
PLATELET # BLD AUTO: 259 10*3/MM3 (ref 140–450)
RBC # BLD AUTO: 4.03 10*6/MM3 (ref 3.77–5.28)
WBC # BLD AUTO: 7.41 10*3/MM3 (ref 3.4–10.8)

## 2024-04-12 NOTE — TELEPHONE ENCOUNTER
Caller: Sigrid Chen    Relationship: Self    Best call back number: [phone]     What is the best time to reach you: ANYTIME    Who are you requesting to speak with (clinical staff, provider,  specific staff member): DR.KYLE CARIAS        What was the call regarding: PATIENT IS HAVING SURGERY ON 4-24-24. THEY HAVE FAXED US OVER SURGICAL CLEARANCE PAPERWORK. THEY ARE NEEDING IT FILLED OUT AND SENT OVER RIGHT AWAY.    PLEASE FILL THIS OUT AND LET PATIENT KNOW ONCE THIS HAS BEEN SENT OVER.

## 2024-05-20 ENCOUNTER — OFFICE VISIT (OUTPATIENT)
Dept: INTERNAL MEDICINE | Facility: CLINIC | Age: 79
End: 2024-05-20
Payer: MEDICARE

## 2024-05-20 VITALS
SYSTOLIC BLOOD PRESSURE: 120 MMHG | OXYGEN SATURATION: 97 % | HEART RATE: 86 BPM | HEIGHT: 60 IN | DIASTOLIC BLOOD PRESSURE: 82 MMHG | BODY MASS INDEX: 28.86 KG/M2 | WEIGHT: 147 LBS

## 2024-05-20 DIAGNOSIS — L03.115 CELLULITIS OF RIGHT LOWER EXTREMITY: Primary | ICD-10-CM

## 2024-05-20 PROCEDURE — 3074F SYST BP LT 130 MM HG: CPT | Performed by: STUDENT IN AN ORGANIZED HEALTH CARE EDUCATION/TRAINING PROGRAM

## 2024-05-20 PROCEDURE — 1125F AMNT PAIN NOTED PAIN PRSNT: CPT | Performed by: STUDENT IN AN ORGANIZED HEALTH CARE EDUCATION/TRAINING PROGRAM

## 2024-05-20 PROCEDURE — 99213 OFFICE O/P EST LOW 20 MIN: CPT | Performed by: STUDENT IN AN ORGANIZED HEALTH CARE EDUCATION/TRAINING PROGRAM

## 2024-05-20 PROCEDURE — 3079F DIAST BP 80-89 MM HG: CPT | Performed by: STUDENT IN AN ORGANIZED HEALTH CARE EDUCATION/TRAINING PROGRAM

## 2024-05-20 RX ORDER — CEPHALEXIN 500 MG/1
500 CAPSULE ORAL 4 TIMES DAILY
Qty: 24 CAPSULE | Refills: 0 | Status: SHIPPED | OUTPATIENT
Start: 2024-05-20 | End: 2024-05-26

## 2024-05-20 NOTE — PROGRESS NOTES
"  Bobby Salinas D.O.  Internal Medicine  [facility]  [address], [address]  [place] [address]  [phone]      Chief Complaint  Cut on right leg    SUBJECTIVE    History of Present Illness    Sigrid Chen is a 79 y.o. female who presents to the office today as an established patient that last saw me on 4/11/2024.     Here today for acute care visit.    States she cut her right leg 3 days ago. States she has bumped this area multiple times and it has been scabbed over . Most recently hit the end of the car door 3 days ago. The skin had healed completely from the previous injuries. States the skin tore after hitting the car door and bled immediately. The skin around it has gotten red and there is no pus drainage. She denies fever or chills.    Allergies   Allergen Reactions    Codeine Nausea And Vomiting     Sick to stomach    Other      PT SAID SHE CAN'T TAKE ANY TIME RELEASE MEDICINE OR ANYTHING WITH A \"COATING\" ON IT, PT DOES NOT HAVE LARGE INTESTINES SO MEDICATION WILL NOT DISSOLVE    Latex Rash    Nickel Rash    Penicillins Rash        Outpatient Medications Marked as Taking for the 5/20/24 encounter (Office Visit) with Bobby Salinas, DO   Medication Sig Dispense Refill    acetaminophen (TYLENOL) 325 MG tablet As Needed.      amLODIPine (NORVASC) 2.5 MG tablet TAKE 1 TABLET BY MOUTH DAILY 90 tablet 1    B Complex Vitamins (B COMPLEX 1 PO) Take  by mouth.      Cranberry-Vitamin C-Vitamin E 4200-20-3 MG-MG-UNIT capsule Take 1 tablet by mouth Daily. HOLDING FOR SURGERY      cyclobenzaprine (FLEXERIL) 10 MG tablet Take 1 tablet by mouth.      diphenhydrAMINE (BENADRYL) 25 mg capsule Take 1 capsule by mouth At Night As Needed.      gabapentin (NEURONTIN) 100 MG capsule Take 1 capsule by mouth 4 (Four) Times a Day As Needed (pain).      HYDROcodone-acetaminophen (NORCO) 5-325 MG per tablet Take 1 tablet by mouth.      hydrocortisone 2.5 % cream APPLY TOPICALLY TO THE AFFECTED AREA OF " "FACE TWICE DAILY FOR 7 TO 10 DAYS. REPEAT AS NEEDED FLARES      ibuprofen (ADVIL,MOTRIN) 400 MG tablet Take 1 tablet by mouth Every 6 (Six) Hours As Needed for Mild Pain. HOLD PRIOR TO SURGERY      loratadine (CLARITIN) 10 MG tablet Take 1 tablet by mouth Daily.      Magnesium 300 MG capsule Take  by mouth.      multivitamin with minerals tablet tablet Take  by mouth.      predniSONE (DELTASONE) 20 MG tablet Take 3 pills for 5 Dthn 2 for another 5 D thn 1 for another 5 Dthn 1/2 for 4 D thn 1/2 pill every other D till finish 35 tablet 0    valACYclovir (VALTREX) 1000 MG tablet As Needed.          Past Medical History:   Diagnosis Date    Altered bowel elimination due to intestinal ostomy     MARTINEZ CONTINENT INTESTINAL RESERVIOR-INTUBED W/CATH RLQ (LARGE INTESTINE REMOVED)    Anemia     Asthma     NO CURRENT INHALERS    Cataracts, bilateral     Extremity pain     GERD (gastroesophageal reflux disease)     Herpes simplex     History of DVT (deep vein thrombosis)     SUPERFICIAL RLE; DID NOT REQUIRE ANTICOAGULATION.  SAW DR. VLADIMIR MELGOZA    History of MRSA infection 1980    History of transfusion     History of UTI 04/06/2022    recurrent, ESBL    Hypertension     Low back pain     Lumbosacral disc disease     Migraine     Osteopenia     Prediabetes     Spinal stenosis, lumbar     Ulcerative colitis     COLECTOMY LARGE INTESTINE       OBJECTIVE    Vital Signs:   /82   Pulse 86   Ht 152.4 cm (60\")   Wt 66.7 kg (147 lb)   SpO2 97%   BMI 28.71 kg/m²        Physical Exam  Vitals reviewed.   Constitutional:       General: She is not in acute distress.     Appearance: Normal appearance. She is not ill-appearing.   Pulmonary:      Effort: Pulmonary effort is normal. No respiratory distress.   Skin:         Neurological:      Mental Status: She is alert.                                   ASSESSMENT & PLAN     Diagnoses and all orders for this visit:    1. Cellulitis of right lower extremity (Primary)  -Patient " presents to the office for acute care visit for right lower extremity abrasion as discussed above with evidence of cellulitis on exam.  She has history of multiple skin abrasions and even reports a cellulitis episode in the same location due to other injuries over the last year.  She believes she received recent tetanus injection.Per review of chart, last tetanus immunization 10/2023.  At this point the cellulitis is mild and she has no indications of systemic infection.  He is allergic to penicillin.  At this point we will start cephalexin 500 mg 4 times daily for 6 days.  I asked her to fausto the area of redness and monitor for progressive redness.  If she has rapid progression of redness, development of purulent discharge or fever or chills she should report to the emergency department or back to the office for further evaluation.  I will do initial treatment regimen of 6 days and certainly if she has partial improvement at the end of that treatment but not complete improvement her treatment duration can be extended.  -     cephalexin (Keflex) 500 MG capsule; Take 1 capsule by mouth 4 (Four) Times a Day for 6 days.  Dispense: 24 capsule; Refill: 0            Follow Up  No follow-ups on file.    Patient/family had no further questions at this time and verbalized understanding of the plan discussed today.

## 2024-06-28 ENCOUNTER — OFFICE VISIT (OUTPATIENT)
Dept: INTERNAL MEDICINE | Facility: CLINIC | Age: 79
End: 2024-06-28
Payer: MEDICARE

## 2024-06-28 VITALS
HEART RATE: 82 BPM | BODY MASS INDEX: 28.47 KG/M2 | WEIGHT: 145 LBS | SYSTOLIC BLOOD PRESSURE: 122 MMHG | DIASTOLIC BLOOD PRESSURE: 80 MMHG | HEIGHT: 60 IN | OXYGEN SATURATION: 98 %

## 2024-06-28 DIAGNOSIS — D64.9 NORMOCYTIC ANEMIA: ICD-10-CM

## 2024-06-28 DIAGNOSIS — I10 ESSENTIAL HYPERTENSION: Primary | ICD-10-CM

## 2024-06-28 PROCEDURE — 1125F AMNT PAIN NOTED PAIN PRSNT: CPT | Performed by: STUDENT IN AN ORGANIZED HEALTH CARE EDUCATION/TRAINING PROGRAM

## 2024-06-28 PROCEDURE — G2211 COMPLEX E/M VISIT ADD ON: HCPCS | Performed by: STUDENT IN AN ORGANIZED HEALTH CARE EDUCATION/TRAINING PROGRAM

## 2024-06-28 PROCEDURE — 3074F SYST BP LT 130 MM HG: CPT | Performed by: STUDENT IN AN ORGANIZED HEALTH CARE EDUCATION/TRAINING PROGRAM

## 2024-06-28 PROCEDURE — 3079F DIAST BP 80-89 MM HG: CPT | Performed by: STUDENT IN AN ORGANIZED HEALTH CARE EDUCATION/TRAINING PROGRAM

## 2024-06-28 PROCEDURE — 99214 OFFICE O/P EST MOD 30 MIN: CPT | Performed by: STUDENT IN AN ORGANIZED HEALTH CARE EDUCATION/TRAINING PROGRAM

## 2024-06-28 NOTE — PROGRESS NOTES
"  Bobby Salinas D.O.  Internal Medicine  [facility]  [address], [address]  [place] [address]  [phone]      Chief Complaint  6 mos check-up  Hypertension     SUBJECTIVE    History of Present Illness    Sigrid Chen is a 79 y.o. female who presents to the office today as an established patient that last saw me on 5/20/2024.   HTN: taking amlodipine 2.5 mg once daily, doesn't check BP at home.     Allergies   Allergen Reactions    Codeine Nausea And Vomiting     Sick to stomach    Other      PT SAID SHE CAN'T TAKE ANY TIME RELEASE MEDICINE OR ANYTHING WITH A \"COATING\" ON IT, PT DOES NOT HAVE LARGE INTESTINES SO MEDICATION WILL NOT DISSOLVE    Latex Rash    Nickel Rash    Penicillins Rash        Outpatient Medications Marked as Taking for the 6/28/24 encounter (Office Visit) with Bobby Salinas, DO   Medication Sig Dispense Refill    acetaminophen (TYLENOL) 325 MG tablet As Needed.      amLODIPine (NORVASC) 2.5 MG tablet TAKE 1 TABLET BY MOUTH DAILY 90 tablet 1    B Complex Vitamins (B COMPLEX 1 PO) Take  by mouth.      Cranberry-Vitamin C-Vitamin E 4200-20-3 MG-MG-UNIT capsule Take 1 tablet by mouth Daily. HOLDING FOR SURGERY      diphenhydrAMINE (BENADRYL) 25 mg capsule Take 1 capsule by mouth At Night As Needed.      gabapentin (NEURONTIN) 100 MG capsule Take 1 capsule by mouth 4 (Four) Times a Day As Needed (pain).      hydrocortisone 2.5 % cream APPLY TOPICALLY TO THE AFFECTED AREA OF FACE TWICE DAILY FOR 7 TO 10 DAYS. REPEAT AS NEEDED FLARES      ibuprofen (ADVIL,MOTRIN) 400 MG tablet Take 1 tablet by mouth Every 6 (Six) Hours As Needed for Mild Pain. HOLD PRIOR TO SURGERY      loratadine (CLARITIN) 10 MG tablet Take 1 tablet by mouth Daily.      Magnesium 300 MG capsule Take  by mouth.      multivitamin with minerals tablet tablet Take  by mouth.      valACYclovir (VALTREX) 1000 MG tablet As Needed.          Past Medical History:   Diagnosis Date    Altered bowel elimination " "due to intestinal ostomy     MARTINEZ CONTINENT INTESTINAL RESERVIOR-INTUBED W/CATH RLQ (LARGE INTESTINE REMOVED)    Anemia     Asthma     NO CURRENT INHALERS    Cataracts, bilateral     Extremity pain     GERD (gastroesophageal reflux disease)     Herpes simplex     History of DVT (deep vein thrombosis)     SUPERFICIAL RLE; DID NOT REQUIRE ANTICOAGULATION.  SAW DR. VLADIMIR MELGOZA    History of MRSA infection 1980    History of transfusion     History of UTI 04/06/2022    recurrent, ESBL    Hypertension     Low back pain     Lumbosacral disc disease     Migraine     Osteopenia     Prediabetes     Spinal stenosis, lumbar     Ulcerative colitis     COLECTOMY LARGE INTESTINE       OBJECTIVE    Vital Signs:   /80   Pulse 82   Ht 152.4 cm (60\")   Wt 65.8 kg (145 lb)   SpO2 98%   BMI 28.32 kg/m²        Physical Exam  Vitals reviewed.   Constitutional:       General: She is not in acute distress.     Appearance: Normal appearance. She is not ill-appearing.   Eyes:      General: No scleral icterus.  Cardiovascular:      Rate and Rhythm: Normal rate and regular rhythm.      Heart sounds: Murmur heard.      Systolic murmur is present with a grade of 3/6.   Pulmonary:      Effort: Pulmonary effort is normal. No respiratory distress.      Breath sounds: Normal breath sounds. No wheezing.   Musculoskeletal:      Right lower leg: No edema.      Left lower leg: No edema.   Skin:     Coloration: Skin is not jaundiced.   Neurological:      Mental Status: She is alert.   Psychiatric:         Mood and Affect: Mood normal.         Behavior: Behavior normal.         Thought Content: Thought content normal.                             ASSESSMENT & PLAN     Diagnoses and all orders for this visit:    1. Essential hypertension (Primary)  - taking amlodipine 2.5 mg once daily, doesn't check BP at home.   -/80 in office today.  Great control.  Continue current regimen.  Check BMP today.  -     Basic Metabolic Panel    2. " Normocytic anemia  Lab Results   Component Value Date    WBC 7.41 04/11/2024    HGB 11.4 (L) 04/11/2024    HCT 35.6 04/11/2024    MCV 88.3 04/11/2024     04/11/2024     -Incidental finding of mild normocytic anemia on April 2024 labs conducted for preoperative assessment.  I will recheck CBC today as well as anemia workup as below.  Further plan depending upon results  -     CBC & Differential  -     Iron Profile  -     Ferritin  -     Vitamin B12 Deficiency Cascade  -     Folate  -     TSH Rfx On Abnormal To Free T4            Follow Up  Return in about 6 months (around 12/28/2024) for Medicare Wellness.    Patient/family had no further questions at this time and verbalized understanding of the plan discussed today.

## 2024-06-29 LAB
BASOPHILS # BLD AUTO: 0.1 10*3/MM3 (ref 0–0.2)
BASOPHILS NFR BLD AUTO: 1.2 % (ref 0–1.5)
BUN SERPL-MCNC: 15 MG/DL (ref 8–23)
BUN/CREAT SERPL: 19.2 (ref 7–25)
CALCIUM SERPL-MCNC: 9.8 MG/DL (ref 8.6–10.5)
CHLORIDE SERPL-SCNC: 99 MMOL/L (ref 98–107)
CO2 SERPL-SCNC: 24.1 MMOL/L (ref 22–29)
CREAT SERPL-MCNC: 0.78 MG/DL (ref 0.57–1)
EGFRCR SERPLBLD CKD-EPI 2021: 77.4 ML/MIN/1.73
EOSINOPHIL # BLD AUTO: 0.33 10*3/MM3 (ref 0–0.4)
EOSINOPHIL NFR BLD AUTO: 4 % (ref 0.3–6.2)
ERYTHROCYTE [DISTWIDTH] IN BLOOD BY AUTOMATED COUNT: 14.1 % (ref 12.3–15.4)
FERRITIN SERPL-MCNC: 29.2 NG/ML (ref 13–150)
FOLATE SERPL-MCNC: >20 NG/ML (ref 4.78–24.2)
GLUCOSE SERPL-MCNC: 97 MG/DL (ref 65–99)
HCT VFR BLD AUTO: 36.9 % (ref 34–46.6)
HGB BLD-MCNC: 12.1 G/DL (ref 12–15.9)
IMM GRANULOCYTES # BLD AUTO: 0.03 10*3/MM3 (ref 0–0.05)
IMM GRANULOCYTES NFR BLD AUTO: 0.4 % (ref 0–0.5)
INTERPRETATION: NORMAL
IRON SATN MFR SERPL: 20 % (ref 20–50)
IRON SERPL-MCNC: 88 MCG/DL (ref 37–145)
LYMPHOCYTES # BLD AUTO: 3.59 10*3/MM3 (ref 0.7–3.1)
LYMPHOCYTES NFR BLD AUTO: 43.1 % (ref 19.6–45.3)
MCH RBC QN AUTO: 28.1 PG (ref 26.6–33)
MCHC RBC AUTO-ENTMCNC: 32.8 G/DL (ref 31.5–35.7)
MCV RBC AUTO: 85.8 FL (ref 79–97)
MONOCYTES # BLD AUTO: 0.75 10*3/MM3 (ref 0.1–0.9)
MONOCYTES NFR BLD AUTO: 9 % (ref 5–12)
NEUTROPHILS # BLD AUTO: 3.53 10*3/MM3 (ref 1.7–7)
NEUTROPHILS NFR BLD AUTO: 42.3 % (ref 42.7–76)
NRBC BLD AUTO-RTO: 0 /100 WBC (ref 0–0.2)
PLATELET # BLD AUTO: 277 10*3/MM3 (ref 140–450)
POTASSIUM SERPL-SCNC: 4 MMOL/L (ref 3.5–5.2)
RBC # BLD AUTO: 4.3 10*6/MM3 (ref 3.77–5.28)
SODIUM SERPL-SCNC: 136 MMOL/L (ref 136–145)
TIBC SERPL-MCNC: 434 MCG/DL
TSH SERPL DL<=0.005 MIU/L-ACNC: 2.03 UIU/ML (ref 0.27–4.2)
UIBC SERPL-MCNC: 346 MCG/DL (ref 112–346)
VIT B12 SERPL-MCNC: 539 PG/ML (ref 232–1245)
WBC # BLD AUTO: 8.33 10*3/MM3 (ref 3.4–10.8)

## 2024-07-19 ENCOUNTER — OFFICE VISIT (OUTPATIENT)
Dept: INTERNAL MEDICINE | Facility: CLINIC | Age: 79
End: 2024-07-19
Payer: MEDICARE

## 2024-07-19 DIAGNOSIS — R51.9 TEMPORAL HEADACHE: Primary | ICD-10-CM

## 2024-07-19 PROCEDURE — 99214 OFFICE O/P EST MOD 30 MIN: CPT | Performed by: STUDENT IN AN ORGANIZED HEALTH CARE EDUCATION/TRAINING PROGRAM

## 2024-07-19 PROCEDURE — 1125F AMNT PAIN NOTED PAIN PRSNT: CPT | Performed by: STUDENT IN AN ORGANIZED HEALTH CARE EDUCATION/TRAINING PROGRAM

## 2024-07-22 ENCOUNTER — LAB (OUTPATIENT)
Facility: HOSPITAL | Age: 79
End: 2024-07-22
Payer: MEDICARE

## 2024-07-22 LAB
ALBUMIN SERPL-MCNC: 4.3 G/DL (ref 3.5–5.2)
ALBUMIN/GLOB SERPL: 1.5 G/DL
ALP SERPL-CCNC: 60 U/L (ref 39–117)
ALT SERPL W P-5'-P-CCNC: 19 U/L (ref 1–33)
ANION GAP SERPL CALCULATED.3IONS-SCNC: 9.2 MMOL/L (ref 5–15)
AST SERPL-CCNC: 27 U/L (ref 1–32)
BACTERIA UR QL AUTO: ABNORMAL /HPF
BASOPHILS # BLD AUTO: 0.11 10*3/MM3 (ref 0–0.2)
BASOPHILS NFR BLD AUTO: 1.5 % (ref 0–1.5)
BILIRUB SERPL-MCNC: 0.3 MG/DL (ref 0–1.2)
BILIRUB UR QL STRIP: NEGATIVE
BUN SERPL-MCNC: 11 MG/DL (ref 8–23)
BUN/CREAT SERPL: 10.9 (ref 7–25)
CALCIUM SPEC-SCNC: 10 MG/DL (ref 8.6–10.5)
CHLORIDE SERPL-SCNC: 104 MMOL/L (ref 98–107)
CLARITY UR: CLEAR
CO2 SERPL-SCNC: 23.8 MMOL/L (ref 22–29)
COLOR UR: ABNORMAL
CREAT SERPL-MCNC: 1.01 MG/DL (ref 0.57–1)
CRP SERPL-MCNC: <0.3 MG/DL (ref 0–0.5)
DEPRECATED RDW RBC AUTO: 43.5 FL (ref 37–54)
EGFRCR SERPLBLD CKD-EPI 2021: 56.7 ML/MIN/1.73
EOSINOPHIL # BLD AUTO: 0.18 10*3/MM3 (ref 0–0.4)
EOSINOPHIL NFR BLD AUTO: 2.5 % (ref 0.3–6.2)
ERYTHROCYTE [DISTWIDTH] IN BLOOD BY AUTOMATED COUNT: 13.9 % (ref 12.3–15.4)
ERYTHROCYTE [SEDIMENTATION RATE] IN BLOOD: 9 MM/HR (ref 0–30)
GLOBULIN UR ELPH-MCNC: 2.8 GM/DL
GLUCOSE SERPL-MCNC: 100 MG/DL (ref 65–99)
GLUCOSE UR STRIP-MCNC: NEGATIVE MG/DL
HCT VFR BLD AUTO: 38.1 % (ref 34–46.6)
HGB BLD-MCNC: 12.1 G/DL (ref 12–15.9)
HGB UR QL STRIP.AUTO: NEGATIVE
HYALINE CASTS UR QL AUTO: ABNORMAL /LPF
IMM GRANULOCYTES # BLD AUTO: 0.02 10*3/MM3 (ref 0–0.05)
IMM GRANULOCYTES NFR BLD AUTO: 0.3 % (ref 0–0.5)
KETONES UR QL STRIP: ABNORMAL
LEUKOCYTE ESTERASE UR QL STRIP.AUTO: ABNORMAL
LYMPHOCYTES # BLD AUTO: 2.96 10*3/MM3 (ref 0.7–3.1)
LYMPHOCYTES NFR BLD AUTO: 40.8 % (ref 19.6–45.3)
MCH RBC QN AUTO: 27.3 PG (ref 26.6–33)
MCHC RBC AUTO-ENTMCNC: 31.8 G/DL (ref 31.5–35.7)
MCV RBC AUTO: 85.8 FL (ref 79–97)
MONOCYTES # BLD AUTO: 0.63 10*3/MM3 (ref 0.1–0.9)
MONOCYTES NFR BLD AUTO: 8.7 % (ref 5–12)
NEUTROPHILS NFR BLD AUTO: 3.36 10*3/MM3 (ref 1.7–7)
NEUTROPHILS NFR BLD AUTO: 46.2 % (ref 42.7–76)
NITRITE UR QL STRIP: NEGATIVE
NRBC BLD AUTO-RTO: 0 /100 WBC (ref 0–0.2)
PH UR STRIP.AUTO: 6 [PH] (ref 5–8)
PLATELET # BLD AUTO: 284 10*3/MM3 (ref 140–450)
PMV BLD AUTO: 11.5 FL (ref 6–12)
POTASSIUM SERPL-SCNC: 4.3 MMOL/L (ref 3.5–5.2)
PROT SERPL-MCNC: 7.1 G/DL (ref 6–8.5)
PROT UR QL STRIP: ABNORMAL
RBC # BLD AUTO: 4.44 10*6/MM3 (ref 3.77–5.28)
RBC # UR STRIP: ABNORMAL /HPF
REF LAB TEST METHOD: ABNORMAL
SODIUM SERPL-SCNC: 137 MMOL/L (ref 136–145)
SP GR UR STRIP: 1.02 (ref 1–1.03)
SQUAMOUS #/AREA URNS HPF: ABNORMAL /HPF
UROBILINOGEN UR QL STRIP: ABNORMAL
WBC # UR STRIP: ABNORMAL /HPF
WBC NRBC COR # BLD AUTO: 7.26 10*3/MM3 (ref 3.4–10.8)

## 2024-07-22 PROCEDURE — 85025 COMPLETE CBC W/AUTO DIFF WBC: CPT | Performed by: STUDENT IN AN ORGANIZED HEALTH CARE EDUCATION/TRAINING PROGRAM

## 2024-07-22 PROCEDURE — 36415 COLL VENOUS BLD VENIPUNCTURE: CPT | Performed by: STUDENT IN AN ORGANIZED HEALTH CARE EDUCATION/TRAINING PROGRAM

## 2024-07-22 PROCEDURE — 86140 C-REACTIVE PROTEIN: CPT | Performed by: STUDENT IN AN ORGANIZED HEALTH CARE EDUCATION/TRAINING PROGRAM

## 2024-07-22 PROCEDURE — 85652 RBC SED RATE AUTOMATED: CPT | Performed by: STUDENT IN AN ORGANIZED HEALTH CARE EDUCATION/TRAINING PROGRAM

## 2024-07-22 PROCEDURE — 80053 COMPREHEN METABOLIC PANEL: CPT | Performed by: STUDENT IN AN ORGANIZED HEALTH CARE EDUCATION/TRAINING PROGRAM

## 2024-07-22 PROCEDURE — 81001 URINALYSIS AUTO W/SCOPE: CPT | Performed by: STUDENT IN AN ORGANIZED HEALTH CARE EDUCATION/TRAINING PROGRAM

## 2024-07-23 ENCOUNTER — TELEPHONE (OUTPATIENT)
Dept: INTERNAL MEDICINE | Facility: CLINIC | Age: 79
End: 2024-07-23
Payer: MEDICARE

## 2024-07-23 NOTE — TELEPHONE ENCOUNTER
Spoke with patient and informed her that another provider will be reading the results and once they review them we will call patient with results.   Pt verbalized understanding.

## 2024-07-23 NOTE — TELEPHONE ENCOUNTER
Caller: Sigrid Chen    Relationship: Self    Best call back number: [phone]     Caller requesting test results:     What test was performed: BLOOD WORK    When was the test performed: 07/22/24    Where was the test performed: [facility]     Additional notes: PATIENT CALLING WANTING TO KNOW THE RESULTS OF HER LABS

## 2024-07-24 DIAGNOSIS — I10 ESSENTIAL HYPERTENSION: ICD-10-CM

## 2024-07-24 RX ORDER — AMLODIPINE BESYLATE 2.5 MG/1
2.5 TABLET ORAL DAILY
Qty: 90 TABLET | Refills: 1 | Status: SHIPPED | OUTPATIENT
Start: 2024-07-24

## 2024-07-25 NOTE — TELEPHONE ENCOUNTER
Covered work for Dr Salinas as he is out of the office   Overall labs are normal   Creatinine measure of kidney function is slightly elevated recommend she increase water intake and will need to recheck it   Complete blood count and inflammatory markers are normal   She will need to schedule a follow up with him

## 2024-07-25 NOTE — TELEPHONE ENCOUNTER
PATIENT STATED THAT SHE WAS TOLD THAT SOMEONE WOULD REACH OUT SHE WAS TOLD THIS ON MONDAY, AND STILL HAS NOT HEARD ANYTHING REGARDING THE RESULTS.  PATIENT IS CONCERNED ABOUT THIS, AND IF  IS STILL OUT  PATIENT NEEDS A PHONE CALL TO LET HER KNOW WHAT IS GOING ON. THESE WERE IMPORTANT LABS REGARDING IMPORTANT MATTER.    PLEASE CALL TO DISCUSS. PATIENT ASKS IF SOMEONE CAN PLEASE CALL SO SHE KNOWS EVERYTHING IS OKAY SO SHE DOESN'T HAVE TO WORRY.

## 2024-10-24 ENCOUNTER — OFFICE VISIT (OUTPATIENT)
Dept: INTERNAL MEDICINE | Facility: CLINIC | Age: 79
End: 2024-10-24
Payer: MEDICARE

## 2024-10-24 VITALS
BODY MASS INDEX: 28.07 KG/M2 | OXYGEN SATURATION: 99 % | HEIGHT: 60 IN | SYSTOLIC BLOOD PRESSURE: 120 MMHG | HEART RATE: 75 BPM | WEIGHT: 143 LBS | DIASTOLIC BLOOD PRESSURE: 80 MMHG | TEMPERATURE: 97.8 F

## 2024-10-24 DIAGNOSIS — D18.01 CHERRY ANGIOMA: Primary | ICD-10-CM

## 2024-10-24 PROCEDURE — 3079F DIAST BP 80-89 MM HG: CPT | Performed by: STUDENT IN AN ORGANIZED HEALTH CARE EDUCATION/TRAINING PROGRAM

## 2024-10-24 PROCEDURE — 3074F SYST BP LT 130 MM HG: CPT | Performed by: STUDENT IN AN ORGANIZED HEALTH CARE EDUCATION/TRAINING PROGRAM

## 2024-10-24 PROCEDURE — 99212 OFFICE O/P EST SF 10 MIN: CPT | Performed by: STUDENT IN AN ORGANIZED HEALTH CARE EDUCATION/TRAINING PROGRAM

## 2024-10-24 PROCEDURE — 1125F AMNT PAIN NOTED PAIN PRSNT: CPT | Performed by: STUDENT IN AN ORGANIZED HEALTH CARE EDUCATION/TRAINING PROGRAM

## 2025-01-02 DIAGNOSIS — I10 ESSENTIAL HYPERTENSION: Primary | ICD-10-CM

## 2025-01-03 LAB
ALBUMIN SERPL-MCNC: 4.2 G/DL (ref 3.8–4.8)
ALP SERPL-CCNC: 71 IU/L (ref 44–121)
ALT SERPL-CCNC: 16 IU/L (ref 0–32)
AST SERPL-CCNC: 21 IU/L (ref 0–40)
BASOPHILS # BLD AUTO: 0.1 X10E3/UL (ref 0–0.2)
BASOPHILS NFR BLD AUTO: 1 %
BILIRUB SERPL-MCNC: 0.3 MG/DL (ref 0–1.2)
BUN SERPL-MCNC: 13 MG/DL (ref 8–27)
BUN/CREAT SERPL: 15 (ref 12–28)
CALCIUM SERPL-MCNC: 9.7 MG/DL (ref 8.7–10.3)
CHLORIDE SERPL-SCNC: 100 MMOL/L (ref 96–106)
CO2 SERPL-SCNC: 22 MMOL/L (ref 20–29)
CREAT SERPL-MCNC: 0.86 MG/DL (ref 0.57–1)
EGFRCR SERPLBLD CKD-EPI 2021: 69 ML/MIN/1.73
EOSINOPHIL # BLD AUTO: 0.2 X10E3/UL (ref 0–0.4)
EOSINOPHIL NFR BLD AUTO: 2 %
ERYTHROCYTE [DISTWIDTH] IN BLOOD BY AUTOMATED COUNT: 12.9 % (ref 11.7–15.4)
GLOBULIN SER CALC-MCNC: 2.3 G/DL (ref 1.5–4.5)
GLUCOSE SERPL-MCNC: 94 MG/DL (ref 70–99)
HCT VFR BLD AUTO: 37 % (ref 34–46.6)
HGB BLD-MCNC: 12 G/DL (ref 11.1–15.9)
IMM GRANULOCYTES # BLD AUTO: 0 X10E3/UL (ref 0–0.1)
IMM GRANULOCYTES NFR BLD AUTO: 0 %
LYMPHOCYTES # BLD AUTO: 3.2 X10E3/UL (ref 0.7–3.1)
LYMPHOCYTES NFR BLD AUTO: 36 %
MCH RBC QN AUTO: 27.8 PG (ref 26.6–33)
MCHC RBC AUTO-ENTMCNC: 32.4 G/DL (ref 31.5–35.7)
MCV RBC AUTO: 86 FL (ref 79–97)
MONOCYTES # BLD AUTO: 0.7 X10E3/UL (ref 0.1–0.9)
MONOCYTES NFR BLD AUTO: 8 %
NEUTROPHILS # BLD AUTO: 4.7 X10E3/UL (ref 1.4–7)
NEUTROPHILS NFR BLD AUTO: 53 %
PLATELET # BLD AUTO: 286 X10E3/UL (ref 150–450)
POTASSIUM SERPL-SCNC: 4.2 MMOL/L (ref 3.5–5.2)
PROT SERPL-MCNC: 6.5 G/DL (ref 6–8.5)
RBC # BLD AUTO: 4.32 X10E6/UL (ref 3.77–5.28)
SODIUM SERPL-SCNC: 133 MMOL/L (ref 134–144)
WBC # BLD AUTO: 8.9 X10E3/UL (ref 3.4–10.8)

## 2025-01-16 DIAGNOSIS — I10 ESSENTIAL HYPERTENSION: ICD-10-CM

## 2025-01-16 RX ORDER — AMLODIPINE BESYLATE 2.5 MG/1
2.5 TABLET ORAL DAILY
Qty: 30 TABLET | OUTPATIENT
Start: 2025-01-16

## 2025-01-16 RX ORDER — AMLODIPINE BESYLATE 2.5 MG/1
2.5 TABLET ORAL DAILY
Qty: 90 TABLET | Refills: 1 | Status: SHIPPED | OUTPATIENT
Start: 2025-01-16

## 2025-02-20 ENCOUNTER — OFFICE VISIT (OUTPATIENT)
Dept: INTERNAL MEDICINE | Facility: CLINIC | Age: 80
End: 2025-02-20
Payer: MEDICARE

## 2025-02-20 VITALS
OXYGEN SATURATION: 98 % | HEART RATE: 75 BPM | HEIGHT: 60 IN | WEIGHT: 140 LBS | SYSTOLIC BLOOD PRESSURE: 152 MMHG | DIASTOLIC BLOOD PRESSURE: 90 MMHG | BODY MASS INDEX: 27.48 KG/M2

## 2025-02-20 DIAGNOSIS — R68.84 JAW PAIN: ICD-10-CM

## 2025-02-20 DIAGNOSIS — M26.609 TMJ (TEMPOROMANDIBULAR JOINT DISORDER): Primary | ICD-10-CM

## 2025-02-20 DIAGNOSIS — R53.83 FATIGUE, UNSPECIFIED TYPE: ICD-10-CM

## 2025-02-20 LAB
BASOPHILS # BLD AUTO: 0.11 10*3/MM3 (ref 0–0.2)
BASOPHILS NFR BLD AUTO: 1.7 % (ref 0–1.5)
CRP SERPL-MCNC: <0.3 MG/DL (ref 0–0.5)
EOSINOPHIL # BLD AUTO: 0.13 10*3/MM3 (ref 0–0.4)
EOSINOPHIL NFR BLD AUTO: 2.1 % (ref 0.3–6.2)
ERYTHROCYTE [DISTWIDTH] IN BLOOD BY AUTOMATED COUNT: 14.1 % (ref 12.3–15.4)
ERYTHROCYTE [SEDIMENTATION RATE] IN BLOOD BY WESTERGREN METHOD: 10 MM/HR (ref 0–30)
HCT VFR BLD AUTO: 38 % (ref 34–46.6)
HGB BLD-MCNC: 12 G/DL (ref 12–15.9)
IMM GRANULOCYTES # BLD AUTO: 0.02 10*3/MM3 (ref 0–0.05)
IMM GRANULOCYTES NFR BLD AUTO: 0.3 % (ref 0–0.5)
LYMPHOCYTES # BLD AUTO: 2.04 10*3/MM3 (ref 0.7–3.1)
LYMPHOCYTES NFR BLD AUTO: 32.4 % (ref 19.6–45.3)
MCH RBC QN AUTO: 27.6 PG (ref 26.6–33)
MCHC RBC AUTO-ENTMCNC: 31.6 G/DL (ref 31.5–35.7)
MCV RBC AUTO: 87.6 FL (ref 79–97)
MONOCYTES # BLD AUTO: 0.56 10*3/MM3 (ref 0.1–0.9)
MONOCYTES NFR BLD AUTO: 8.9 % (ref 5–12)
NEUTROPHILS # BLD AUTO: 3.43 10*3/MM3 (ref 1.7–7)
NEUTROPHILS NFR BLD AUTO: 54.6 % (ref 42.7–76)
NRBC BLD AUTO-RTO: 0 /100 WBC (ref 0–0.2)
PLATELET # BLD AUTO: 286 10*3/MM3 (ref 140–450)
RBC # BLD AUTO: 4.34 10*6/MM3 (ref 3.77–5.28)
TSH SERPL DL<=0.005 MIU/L-ACNC: 3.19 UIU/ML (ref 0.27–4.2)
WBC # BLD AUTO: 6.29 10*3/MM3 (ref 3.4–10.8)

## 2025-02-20 PROCEDURE — 3077F SYST BP >= 140 MM HG: CPT | Performed by: STUDENT IN AN ORGANIZED HEALTH CARE EDUCATION/TRAINING PROGRAM

## 2025-02-20 PROCEDURE — 1125F AMNT PAIN NOTED PAIN PRSNT: CPT | Performed by: STUDENT IN AN ORGANIZED HEALTH CARE EDUCATION/TRAINING PROGRAM

## 2025-02-20 PROCEDURE — 99214 OFFICE O/P EST MOD 30 MIN: CPT | Performed by: STUDENT IN AN ORGANIZED HEALTH CARE EDUCATION/TRAINING PROGRAM

## 2025-02-20 PROCEDURE — 3080F DIAST BP >= 90 MM HG: CPT | Performed by: STUDENT IN AN ORGANIZED HEALTH CARE EDUCATION/TRAINING PROGRAM

## 2025-02-20 NOTE — PROGRESS NOTES
"  Bobby Salinas D.O.  Internal Medicine  [facility]  [address], [address]  [place] [address]  [phone]      Chief Complaint  Jaw Pain (B/L)    SUBJECTIVE    History of Present Illness    Sigrid Chen is a 79 y.o. female who presents to the office today as an established patient that last saw me on 10/24/2024.     History of Present Illness  The patient presents for evaluation of jaw tightness, headaches, fatigue.    She reports experiencing significant stress, which she believes may be contributing to her bilateral jaw tightness. This symptom has been present since 01/21/2025, with the left side being more affected than the right. She occasionally hears a popping sound in her jaw but does not experience any pain during chewing or at rest. She has sought dental consultation to rule out dental causes, and her dentist suggested a potential diagnosis of temporomandibular joint (TMJ) disorder. She has been managing her symptoms through self-massage and avoidance of gum chewing. She reports no systemic symptoms such as fevers or chills. Stress exacerbates her jaw tightness.    She also reports new-onset fatigue and increased frequency of visual floaters, although she has a pre-existing history of floaters. She occasionally feels the need to blink excessively to improve her vision.    Additionally, she experiences frontal headaches, which she attributes to weather changes.           Allergies   Allergen Reactions    Codeine Nausea And Vomiting     Sick to stomach    Other      PT SAID SHE CAN'T TAKE ANY TIME RELEASE MEDICINE OR ANYTHING WITH A \"COATING\" ON IT, PT DOES NOT HAVE LARGE INTESTINES SO MEDICATION WILL NOT DISSOLVE    Latex Rash    Nickel Rash    Penicillins Rash        Outpatient Medications Marked as Taking for the 2/20/25 encounter (Office Visit) with Bobby Salinas, DO   Medication Sig Dispense Refill    acetaminophen (TYLENOL) 325 MG tablet As Needed.      amLODIPine " "(NORVASC) 2.5 MG tablet TAKE 1 TABLET BY MOUTH DAILY 90 tablet 1    B Complex Vitamins (B COMPLEX 1 PO) Take  by mouth.      Cranberry-Vitamin C-Vitamin E 4200-20-3 MG-MG-UNIT capsule Take 1 tablet by mouth Daily. HOLDING FOR SURGERY      diphenhydrAMINE (BENADRYL) 25 mg capsule Take 1 capsule by mouth At Night As Needed.      hydrocortisone 2.5 % cream APPLY TOPICALLY TO THE AFFECTED AREA OF FACE TWICE DAILY FOR 7 TO 10 DAYS. REPEAT AS NEEDED FLARES      ibuprofen (ADVIL,MOTRIN) 400 MG tablet Take 1 tablet by mouth Every 6 (Six) Hours As Needed for Mild Pain. HOLD PRIOR TO SURGERY      loratadine (CLARITIN) 10 MG tablet Take 1 tablet by mouth Daily.      Magnesium 300 MG capsule Take  by mouth.      multivitamin with minerals tablet tablet Take  by mouth.      valACYclovir (VALTREX) 1000 MG tablet As Needed.          Past Medical History:   Diagnosis Date    Altered bowel elimination due to intestinal ostomy     MARTINEZ CONTINENT INTESTINAL RESERVIOR-INTUBED W/CATH RLQ (LARGE INTESTINE REMOVED)    Anemia     Asthma     NO CURRENT INHALERS    Cataracts, bilateral     Extremity pain     GERD (gastroesophageal reflux disease)     Herpes simplex     History of DVT (deep vein thrombosis)     SUPERFICIAL RLE; DID NOT REQUIRE ANTICOAGULATION.  SAW DR. VLADIMIR MELGOZA    History of MRSA infection 1980    History of transfusion     History of UTI 04/06/2022    recurrent, ESBL    Hypertension     Low back pain     Lumbosacral disc disease     Migraine     Osteopenia     Prediabetes     Spinal stenosis, lumbar     Ulcerative colitis     COLECTOMY LARGE INTESTINE       OBJECTIVE    Vital Signs:   /90   Pulse 75   Ht 152.4 cm (60\")   Wt 63.5 kg (140 lb)   SpO2 98%   BMI 27.34 kg/m²        Physical Exam  Awake, alert oriented. No acute distress.   Atraumatic head. There is slight tenderness over the right temporal artery territory. The left temporal area also shows slight tenderness. No palpable vascular cord in these " areas. Crepitus is present in the left jaw. The right ear canal and eardrum appear normal. The left ear canal and tympanic membrane are also normal.                      ASSESSMENT & PLAN     Assessment & Plan  1. Temporomandibular joint (TMJ) disorder.  2. Fatigue.  3. Bilateral Jaw Pain  The primary diagnosis is left-sided TMJ disorder, with a potential mild manifestation on the right side. Symptoms include jaw tightness, popping sounds, and tenderness over the temporal artery area. The patient reports increased stress, which may exacerbate the condition. A referral for physical therapy has been placed. The patient has been advised to avoid activities that involve excessive chewing, such as gum chewing. An ESR and CRP test will be conducted to rule out temporal arteritis as a cause of her symptoms. Jan 2025 CMP and CBC showed no abnormalities that could be attributed to fatigue  and June 2024 TSH was normal but I will repeat TSH and CBC today to re-evaluate. She was also recommended to reach out to her dentist regarding dental appliance that can help with TMJ symptoms and also seek out meditation activities or mental health counseling as it relates to stress.              Follow Up  No follow-ups on file.    Patient/family had no further questions at this time and verbalized understanding of the plan discussed today.     Patient or patient representative verbalized consent for the use of Ambient Listening during the visit with  Bobby Salinas DO for chart documentation. 2/20/2025  09:09 EST

## 2025-02-20 NOTE — PROGRESS NOTES
"  [facility] DO, [facility]  Internal Medicine  [facility]  [address], [address]  [place] [address]  [phone]    Chief Complaint  No chief complaint on file.    SUBJECTIVE    History of Present Illness    Sigrid Chen is a 79 y.o. female who presents to the office today as an established patient that last saw me on 10/24/2024.       Allergies   Allergen Reactions    Codeine Nausea And Vomiting     Sick to stomach    Other      PT SAID SHE CAN'T TAKE ANY TIME RELEASE MEDICINE OR ANYTHING WITH A \"COATING\" ON IT, PT DOES NOT HAVE LARGE INTESTINES SO MEDICATION WILL NOT DISSOLVE    Latex Rash    Nickel Rash    Penicillins Rash        No outpatient medications have been marked as taking for the 2/20/25 encounter (Appointment) with Bobby Salinas DO.        {new/est histories:86938}    OBJECTIVE    Vital Signs:   There were no vitals taken for this visit.       Physical Exam       {The following data was reviewed by (Optional):08362}  {Ambulatory Labs (Optional):09216}  {Data reviewed (Optional):81323:::1}             ASSESSMENT & PLAN     There are no diagnoses linked to this encounter.    {Time Spent (Optional):67146}    Follow Up  No follow-ups on file.    Patient/family had no further questions at this time and verbalized understanding of the plan discussed today.   "

## 2025-02-26 ENCOUNTER — OFFICE VISIT (OUTPATIENT)
Dept: INTERNAL MEDICINE | Facility: CLINIC | Age: 80
End: 2025-02-26
Payer: MEDICARE

## 2025-02-26 VITALS
BODY MASS INDEX: 27.68 KG/M2 | TEMPERATURE: 97.5 F | WEIGHT: 141 LBS | HEIGHT: 60 IN | OXYGEN SATURATION: 93 % | DIASTOLIC BLOOD PRESSURE: 80 MMHG | SYSTOLIC BLOOD PRESSURE: 126 MMHG | HEART RATE: 80 BPM

## 2025-02-26 DIAGNOSIS — G47.00 INSOMNIA, UNSPECIFIED TYPE: ICD-10-CM

## 2025-02-26 DIAGNOSIS — R41.3 MEMORY CHANGES: ICD-10-CM

## 2025-02-26 DIAGNOSIS — F41.1 GAD (GENERALIZED ANXIETY DISORDER): ICD-10-CM

## 2025-02-26 DIAGNOSIS — M85.80 OSTEOPENIA, UNSPECIFIED LOCATION: ICD-10-CM

## 2025-02-26 DIAGNOSIS — R73.03 PREDIABETES: ICD-10-CM

## 2025-02-26 DIAGNOSIS — Z00.00 MEDICARE ANNUAL WELLNESS VISIT, SUBSEQUENT: Primary | ICD-10-CM

## 2025-02-26 LAB
25(OH)D3+25(OH)D2 SERPL-MCNC: 57.9 NG/ML (ref 30–100)
HBA1C MFR BLD: 6 % (ref 4.8–5.6)

## 2025-02-26 PROCEDURE — 1126F AMNT PAIN NOTED NONE PRSNT: CPT | Performed by: STUDENT IN AN ORGANIZED HEALTH CARE EDUCATION/TRAINING PROGRAM

## 2025-02-26 PROCEDURE — 99214 OFFICE O/P EST MOD 30 MIN: CPT | Performed by: STUDENT IN AN ORGANIZED HEALTH CARE EDUCATION/TRAINING PROGRAM

## 2025-02-26 PROCEDURE — 1170F FXNL STATUS ASSESSED: CPT | Performed by: STUDENT IN AN ORGANIZED HEALTH CARE EDUCATION/TRAINING PROGRAM

## 2025-02-26 PROCEDURE — G0439 PPPS, SUBSEQ VISIT: HCPCS | Performed by: STUDENT IN AN ORGANIZED HEALTH CARE EDUCATION/TRAINING PROGRAM

## 2025-02-26 PROCEDURE — G2211 COMPLEX E/M VISIT ADD ON: HCPCS | Performed by: STUDENT IN AN ORGANIZED HEALTH CARE EDUCATION/TRAINING PROGRAM

## 2025-02-26 PROCEDURE — 3079F DIAST BP 80-89 MM HG: CPT | Performed by: STUDENT IN AN ORGANIZED HEALTH CARE EDUCATION/TRAINING PROGRAM

## 2025-02-26 PROCEDURE — 3074F SYST BP LT 130 MM HG: CPT | Performed by: STUDENT IN AN ORGANIZED HEALTH CARE EDUCATION/TRAINING PROGRAM

## 2025-02-26 NOTE — PROGRESS NOTES
Subjective   The ABCs of the Annual Wellness Visit  Medicare Wellness Visit      Sigrid Chen is a 79 y.o. patient who presents for a Medicare Wellness Visit.    The following portions of the patient's history were reviewed and   updated as appropriate: allergies, current medications, past family history, past medical history, past social history, past surgical history, and problem list.    Osteopenia: takes vitamin D supplement. She states she loves to walk up to 2 times daily and does a lot of stretching. States she will contact GYN for another DEXA.     Migraine: No medication needed, no migraine in years.      Genital herpes: takes valtrex 500 mg if noticing a flare.      Rosacea: follows with dermatology.     TMJ: having improvement with PT.     Ulcerative colitis: follows with Lizzette Castro M.D. with [facility] colorectal surgery. She has had continent cecostomy.      Arthritis: mainly in her back, previously received epidurals with Dr Miranda pain management but states this has stopped due to ineffectiveness of treatment. States she has had a spinal stenosis surgery in 2016. She takes ibuprofen or Tylenol nightly as needed. Previously prescribed gabapentin from pain management.s/p lumbar decompression 4/2024 which she feels worked out well.      Allergies: takes over the counter antihistamine daily      KENNY: has declined medication in the past. States she does have anxiety and mild. Not in mental health counseling. States politically she is really anxious.     Hyperlipidemia: has declined statin for primary prevention in the past. Now aged out of primary prevention. States she doesn't eat fast foods and normally cooks all meals . She feels her diet is balanced.   Lab Results   Component Value Date    CHOL 187 10/06/2022    CHLPL 183 12/27/2023    TRIG 97 12/27/2023    HDL 55 12/27/2023     (H) 12/27/2023       Prediabetes: states she has a sweet tooth. Doesn't feel she eats a lot of pastas/breads.    Lab Results   Component Value Date    HGBA1C 5.70 (H) 12/27/2023     HTN: takes amlodipine 2.5 mg daily, doesn't check BP regularly at home.     Insomnia: not currently on treatment. States her issue is staying asleep and going back to sleep. She normally goes to bed around 10 Pm- 11 PM She may wake up at 4 AM or 5 AM and may not ever get back to sleep. States it results in her not feeling rested at all. She puts on the sound of rain to help her calm down at night.     Compared to one year ago, the patient's physical   health is the same.  Compared to one year ago, the patient's mental   health is worse.     Recent Hospitalizations:  She was not admitted to the hospital during the last year.     Current Medical Providers:  Patient Care Team:  Bobby Salinas DO as PCP - General (Internal Medicine)    Outpatient Medications Prior to Visit   Medication Sig Dispense Refill    acetaminophen (TYLENOL) 325 MG tablet As Needed.      amLODIPine (NORVASC) 2.5 MG tablet TAKE 1 TABLET BY MOUTH DAILY 90 tablet 1    B Complex Vitamins (B COMPLEX 1 PO) Take  by mouth.      Cranberry-Vitamin C-Vitamin E 4200-20-3 MG-MG-UNIT capsule Take 1 tablet by mouth Daily. HOLDING FOR SURGERY      diphenhydrAMINE (BENADRYL) 25 mg capsule Take 1 capsule by mouth At Night As Needed.      hydrocortisone 2.5 % cream APPLY TOPICALLY TO THE AFFECTED AREA OF FACE TWICE DAILY FOR 7 TO 10 DAYS. REPEAT AS NEEDED FLARES      ibuprofen (ADVIL,MOTRIN) 400 MG tablet Take 1 tablet by mouth Every 6 (Six) Hours As Needed for Mild Pain. HOLD PRIOR TO SURGERY      loratadine (CLARITIN) 10 MG tablet Take 1 tablet by mouth Daily.      Magnesium 300 MG capsule Take  by mouth.      multivitamin with minerals tablet tablet Take  by mouth.      valACYclovir (VALTREX) 1000 MG tablet As Needed.      cyclobenzaprine (FLEXERIL) 10 MG tablet Take 1 tablet by mouth. (Patient not taking: Reported on 10/24/2024)      gabapentin (NEURONTIN) 100 MG capsule Take 1 capsule by  "mouth 4 (Four) Times a Day As Needed (pain). (Patient not taking: Reported on 10/24/2024)       No facility-administered medications prior to visit.     No opioid medication identified on active medication list. I have reviewed chart for other potential  high risk medication/s and harmful drug interactions in the elderly.      Aspirin is not on active medication list.  Aspirin use is not indicated based on review of current medical condition/s. Risk of harm outweighs potential benefits.  .    Patient Active Problem List   Diagnosis    Nonfamilial nocturnal leg cramps    Atypical migraine    Vitamin D deficiency    Cervicogenic headache    Essential hypertension    Gastroesophageal reflux disease    Cervico-occipital neuralgia    Congenital pes planus    Intestinal malabsorption    History of UTI    Urinary tract infectious disease    Chest pain    Frequent urinary tract infections    Heartburn    Hx of blood clots    Migraine without aura and without status migrainosus, not intractable    Tear of right rotator cuff     Advance Care Planning Advance Directive is on file.  ACP discussion was held with the patient during this visit. Patient has an advance directive in EMR which is still valid.             Objective   Vitals:    02/26/25 0804   BP: 126/80   Pulse: 80   Temp: 97.5 °F (36.4 °C)   TempSrc: Infrared   SpO2: 93%   Weight: 64 kg (141 lb)   Height: 152.4 cm (60\")   PainSc: 0-No pain   Physical Exam  Vitals reviewed.   Constitutional:       General: She is not in acute distress.     Appearance: Normal appearance. She is not ill-appearing.   Eyes:      General: No scleral icterus.  Cardiovascular:      Rate and Rhythm: Normal rate and regular rhythm.      Heart sounds: Normal heart sounds. No murmur heard.  Pulmonary:      Effort: Pulmonary effort is normal. No respiratory distress.      Breath sounds: Normal breath sounds. No wheezing.   Skin:     Coloration: Skin is not jaundiced.   Neurological:      Mental " "Status: She is alert.   Psychiatric:         Mood and Affect: Mood normal.         Behavior: Behavior normal.         Thought Content: Thought content normal.           Estimated body mass index is 27.54 kg/m² as calculated from the following:    Height as of this encounter: 152.4 cm (60\").    Weight as of this encounter: 64 kg (141 lb).                Does the patient have evidence of cognitive impairment? No                                                                                               Health  Risk Assessment    Smoking Status:  Social History     Tobacco Use   Smoking Status Never    Passive exposure: Past   Smokeless Tobacco Never     Alcohol Consumption:  Social History     Substance and Sexual Activity   Alcohol Use Yes    Alcohol/week: 2.0 standard drinks of alcohol    Types: 2 Glasses of wine per week       Fall Risk Screen  STEADI Fall Risk Assessment was completed, and patient is at LOW risk for falls.Assessment completed on:2025    Depression Screening   Little interest or pleasure in doing things? Not at all   Feeling down, depressed, or hopeless? Several days   PHQ-2 Total Score 1      Health Habits and Functional and Cognitive Screenin/26/2025     8:09 AM   Functional & Cognitive Status   Do you have difficulty preparing food and eating? No   Do you have difficulty bathing yourself, getting dressed or grooming yourself? No   Do you have difficulty using the toilet? No   Do you have difficulty moving around from place to place? No   Do you have trouble with steps or getting out of a bed or a chair? No   Current Diet Well Balanced Diet   Dental Exam Up to date   Eye Exam Up to date   Exercise (times per week) 4 times per week   Current Exercises Include Walking   Do you need help using the phone?  No   Are you deaf or do you have serious difficulty hearing?  Yes   Do you need help to go to places out of walking distance? No   Do you need help shopping? No   Do you need help " preparing meals?  No   Do you need help with housework?  No   Do you need help with laundry? No   Do you need help taking your medications? No   Do you need help managing money? No   Do you ever drive or ride in a car without wearing a seat belt? No   Have you felt unusual stress, anger or loneliness in the last month? No   Who do you live with? Spouse   If you need help, do you have trouble finding someone available to you? No   Do you have difficulty concentrating, remembering or making decisions? Yes           Age-appropriate Screening Schedule:  Refer to the list below for future screening recommendations based on patient's age, sex and/or medical conditions. Orders for these recommended tests are listed in the plan section. The patient has been provided with a written plan.    Health Maintenance List  Health Maintenance   Topic Date Due    RSV Vaccine - Adults (1 - 1-dose 75+ series) Never done    INFLUENZA VACCINE  07/01/2024    COVID-19 Vaccine (9 - 2024-25 season) 09/03/2024    DXA SCAN  09/29/2024    LIPID PANEL  12/27/2024    BMI FOLLOWUP  02/20/2026    ANNUAL WELLNESS VISIT  02/26/2026    TDAP/TD VACCINES (3 - Tdap) 10/02/2033    HEPATITIS C SCREENING  Completed    Pneumococcal Vaccine 50+  Completed    ZOSTER VACCINE  Completed    MAMMOGRAM  Discontinued    COLORECTAL CANCER SCREENING  Discontinued                                                                                                                                                CMS Preventative Services Quick Reference  Risk Factors Identified During Encounter  Hearing Problem:  wears hearing aids  Immunizations Discussed/Encouraged: Influenza and COVID19    The above risks/problems have been discussed with the patient.  Pertinent information has been shared with the patient in the After Visit Summary.  An After Visit Summary and PPPS were made available to the patient.    Follow Up:   Next Medicare Wellness visit to be scheduled in 1 year.        A problem-based visit was also conducted on the same day, see below for assessment and plan    Diagnoses and all orders for this visit:    1. Prediabetes (Primary)  -states she has a sweet tooth. Doesn't feel she eats a lot of pastas/breads.   Lab Results   Component Value Date    HGBA1C 5.70 (H) 12/27/2023     -recheck A1c for continued annual monitoring  -     Hemoglobin A1c    2. Osteopenia, unspecified location  -takes vitamin D supplement. She states she loves to walk up to 2 times daily and does a lot of stretching. States she will contact GYN for another DEXA.   -advised her she is due for DEXA and to reach out to her GYN to have this scheduled since she prefers to keep it there. Check vitamin D today. Continued to encourage regular exercise. Recent CMP as below with normal calcium.  Lab Results   Component Value Date    GLUCOSE 94 01/02/2025    BUN 13 01/02/2025    CREATININE 0.86 01/02/2025     (L) 01/02/2025    K 4.2 01/02/2025     01/02/2025    CALCIUM 9.7 01/02/2025    PROTEINTOT 6.5 01/02/2025    ALBUMIN 4.2 01/02/2025    ALT 16 01/02/2025    AST 21 01/02/2025    ALKPHOS 71 01/02/2025    BILITOT 0.3 01/02/2025    GLOB 2.3 01/02/2025    AGRATIO 1.5 07/22/2024    BCR 15 01/02/2025    ANIONGAP 9.2 07/22/2024    EGFR 69 01/02/2025       3. KENNY (generalized anxiety disorder)  -has declined medication in the past. States she does have anxiety and mild. Not in mental health counseling. States politically she is really anxious.   -contact information provided for mental health counseling. She still prefers to avoid medication which I think is reasonable at this time.    4. Insomnia, unspecified type  -not currently on treatment. States her issue is staying asleep and going back to sleep. She normally goes to bed around 10 Pm- 11 PM She may wake up at 4 AM or 5 AM and may not ever get back to sleep. States it results in her not feeling rested at all. She puts on the sound of rain to help her calm  down at night.   -again recommended CBT-I therapy and contact information was provided. She does not feel like medication is necessary at this time and I am in agreement.    5. Memory changes  -seems expected age related changes with only delay in getting to words at times. Has never had loss of executive function, lost while driving or other functional loss for example. She states she remains very active managing her household with no issues. Continue to monitor over time.              The following social determinates of health impact the patient's medical decision making: No social determinates of health were factored in to today's visit.     Follow Up  Return in about 6 months (around 8/26/2025) for Recheck.

## 2025-03-12 ENCOUNTER — HOSPITAL ENCOUNTER (OUTPATIENT)
Facility: HOSPITAL | Age: 80
Discharge: HOME | End: 2025-03-12
Attending: EMERGENCY MEDICINE | Admitting: EMERGENCY MEDICINE
Payer: MEDICARE

## 2025-03-12 VITALS
SYSTOLIC BLOOD PRESSURE: 151 MMHG | BODY MASS INDEX: 26.7 KG/M2 | TEMPERATURE: 99.7 F | HEIGHT: 60 IN | DIASTOLIC BLOOD PRESSURE: 83 MMHG | OXYGEN SATURATION: 98 % | HEART RATE: 81 BPM | RESPIRATION RATE: 18 BRPM | WEIGHT: 136 LBS

## 2025-03-12 DIAGNOSIS — U07.1 COVID: Primary | ICD-10-CM

## 2025-03-12 LAB
FLUAV SUBTYP SPEC NAA+PROBE: NOT DETECTED
FLUBV RNA ISLT QL NAA+PROBE: NOT DETECTED
SARS-COV-2 RNA RESP QL NAA+PROBE: DETECTED
STREP A PCR: NOT DETECTED

## 2025-03-12 PROCEDURE — G0463 HOSPITAL OUTPT CLINIC VISIT: HCPCS | Performed by: EMERGENCY MEDICINE

## 2025-03-12 PROCEDURE — 99213 OFFICE O/P EST LOW 20 MIN: CPT | Performed by: EMERGENCY MEDICINE

## 2025-03-12 PROCEDURE — 87636 SARSCOV2 & INF A&B AMP PRB: CPT | Performed by: EMERGENCY MEDICINE

## 2025-03-12 PROCEDURE — 87651 STREP A DNA AMP PROBE: CPT

## 2025-03-12 NOTE — FSED PROVIDER NOTE
"Subjective   History of Present Illness  80-year-old female with 3 days of a mild sore throat, productive cough without color, fatigue, not sleeping well at night, no muscle aches or malaise.  No known fever.  No headache or stiff or sore neck or rash.  No chest pain or abdominal pain or back pain.  No other symptoms.  Possible exposure to COVID or flu      Review of Systems    Past Medical History:   Diagnosis Date    Altered bowel elimination due to intestinal ostomy     MARTINEZ CONTINENT INTESTINAL RESERVIOR-INTUBED W/CATH RLQ (LARGE INTESTINE REMOVED)    Anemia     Asthma     NO CURRENT INHALERS    Cataracts, bilateral     Extremity pain     GERD (gastroesophageal reflux disease)     Herpes simplex     History of DVT (deep vein thrombosis)     SUPERFICIAL RLE; DID NOT REQUIRE ANTICOAGULATION.  SAW DR. VLADIMIR MELGOZA    History of MRSA infection 1980    History of transfusion     History of UTI 04/06/2022    recurrent, ESBL    Hypertension     Low back pain     Lumbosacral disc disease     Migraine     Osteopenia     Prediabetes     Spinal stenosis, lumbar     Ulcerative colitis     COLECTOMY LARGE INTESTINE       Allergies   Allergen Reactions    Codeine Nausea And Vomiting     Sick to stomach    Other      PT SAID SHE CAN'T TAKE ANY TIME RELEASE MEDICINE OR ANYTHING WITH A \"COATING\" ON IT, PT DOES NOT HAVE LARGE INTESTINES SO MEDICATION WILL NOT DISSOLVE    Latex Rash    Nickel Rash    Penicillins Rash       Past Surgical History:   Procedure Laterality Date    APPENDECTOMY      BUNIONECTOMY Bilateral     BUNIONECTOMY Right 02/19/2018    Procedure: RT BUNIONECTOMY WITH DOUBLE OSTEOTOMY 2ND INTERPHALANGEAL ARTHROPLASTY ;  Surgeon: Karel Thomas MD;  [place] [facility] [place] [facility] [place];  Service:     CATARACT EXTRACTION, BILATERAL      CHOLECYSTECTOMY      COLONOSCOPY      CONTINENT CECOSTOMY  1980    LARGE INTESTINE REMOVED, Martinez continent Ileostomy in 1980, pouch is internal, stoma on the outside    LUMBAR DECOMPRESSION "  04/24/2024    RIGHT L3-L5 LUMBAR DECOMPRESSION    LUMBAR DISCECTOMY FUSION INSTRUMENTATION N/A 12/16/2016    Procedure: MICROLAMINECTOMY L3 4  L4 5 W/ METRIX;  Surgeon: Valdez Greene DO;  Location: [facility] MAIN OR;  Service:     ROTATOR CUFF REPAIR Right     VASCULAR SURGERY Right     REPAIRED VEIN RIGHT LOWER EXTREMITY REPAIRED VALVE GROIN AREA    VENOUS CLOSURE Right     VenaSeal Procedure    WISDOM TOOTH EXTRACTION      FOUR       Family History   Problem Relation Age of Onset    Thyroid disease Mother     Lung cancer Mother         cigarette use    Hyperlipidemia Mother     Dementia Mother     Alcohol abuse Father     Suicide Attempts Father     Diabetes Sister     Breast cancer Sister     Hypertension Brother     Diabetes Brother     Diabetes Brother     Heart disease Paternal Grandmother     Aortic aneurysm Other     Malig Hyperthermia Neg Hx        Social History     Socioeconomic History    Marital status:    Tobacco Use    Smoking status: Never     Passive exposure: Past    Smokeless tobacco: Never   Vaping Use    Vaping status: Never Used   Substance and Sexual Activity    Alcohol use: Yes     Alcohol/week: 2.0 standard drinks of alcohol     Types: 2 Glasses of wine per week    Drug use: Never    Sexual activity: Defer           Objective   Physical Exam  Vitals and nursing note reviewed.   Constitutional:       General: She is not in acute distress.     Appearance: Normal appearance. She is normal weight. She is not ill-appearing or toxic-appearing.   HENT:      Mouth/Throat:      Pharynx: Oropharynx is clear.   Eyes:      Extraocular Movements: Extraocular movements intact.      Conjunctiva/sclera: Conjunctivae normal.   Cardiovascular:      Rate and Rhythm: Normal rate and regular rhythm.      Pulses: Normal pulses.      Heart sounds: Normal heart sounds. No murmur heard.  Pulmonary:      Effort: Pulmonary effort is normal. No respiratory distress.      Breath sounds: Normal breath sounds. No  stridor. No wheezing, rhonchi or rales.   Musculoskeletal:         General: Normal range of motion.      Cervical back: Neck supple.   Skin:     General: Skin is warm and dry.   Neurological:      General: No focal deficit present.      Mental Status: She is alert and oriented to person, place, and time. Mental status is at baseline.   Psychiatric:         Mood and Affect: Mood normal.         Behavior: Behavior normal.         Thought Content: Thought content normal.         Judgment: Judgment normal.         Procedures           ED Course  ED Course as of 03/12/25 1349   Wed Mar 12, 2025   1328 Positive flu negative strep negative [AR]      ED Course User Index  [AR] Yumiko Levine MD                                           Medical Decision Making  Differential includes, but not limited to bacterial bronchitis, viral bronchitis, viral pneumonia, bacterial pneumonia, viral sinusitis, bacterial sinusitis, RSV, Strep, COVID, RSV, Pertussis, asthma, COPD, croup       You tested positive for COVID. Take over the counter medications for symptoms. The typical time frame is 5 to 7 days.  Wear a mask around other people so that you do not infect them.  If anything changes that concerns you you are welcome to return here.    She understood and agreed    Problems Addressed:  COVID: acute illness or injury    Amount and/or Complexity of Data Reviewed  Labs: ordered. Decision-making details documented in ED Course.        Final diagnoses:   COVID       ED Disposition  ED Disposition       ED Disposition   Discharge    Condition   Stable    Comment   --               Bobby Salinas DO  [address] [address] [phone]      As needed         Medication List      No changes were made to your prescriptions during this visit.

## 2025-03-12 NOTE — DISCHARGE INSTRUCTIONS
You tested positive for COVID. Take over the counter medications for symptoms. The typical time frame is 5 to 7 days.  Wear a mask around other people so that you do not infect them.  If anything changes that concerns you you are welcome to return here.

## 2025-04-13 ENCOUNTER — HOSPITAL ENCOUNTER (OUTPATIENT)
Facility: HOSPITAL | Age: 80
Discharge: HOME | End: 2025-04-13
Attending: EMERGENCY MEDICINE | Admitting: EMERGENCY MEDICINE
Payer: MEDICARE

## 2025-04-13 ENCOUNTER — NURSE TRIAGE (OUTPATIENT)
Dept: CALL CENTER | Facility: HOSPITAL | Age: 80
End: 2025-04-13
Payer: MEDICARE

## 2025-04-13 VITALS
DIASTOLIC BLOOD PRESSURE: 99 MMHG | HEART RATE: 74 BPM | HEIGHT: 60 IN | BODY MASS INDEX: 26.7 KG/M2 | WEIGHT: 136 LBS | SYSTOLIC BLOOD PRESSURE: 160 MMHG | TEMPERATURE: 98 F | RESPIRATION RATE: 16 BRPM | OXYGEN SATURATION: 100 %

## 2025-04-13 DIAGNOSIS — S05.02XA ABRASION OF LEFT CORNEA, INITIAL ENCOUNTER: Primary | ICD-10-CM

## 2025-04-13 PROCEDURE — G0463 HOSPITAL OUTPT CLINIC VISIT: HCPCS | Performed by: PHYSICIAN ASSISTANT

## 2025-04-13 RX ORDER — TETRACAINE HYDROCHLORIDE 5 MG/ML
1 SOLUTION OPHTHALMIC ONCE
Status: COMPLETED | OUTPATIENT
Start: 2025-04-13 | End: 2025-04-13

## 2025-04-13 RX ORDER — OFLOXACIN 3 MG/ML
1 SOLUTION/ DROPS OPHTHALMIC 4 TIMES DAILY
Qty: 1 ML | Refills: 0 | Status: SHIPPED | OUTPATIENT
Start: 2025-04-13 | End: 2025-04-18

## 2025-04-13 RX ADMIN — TETRACAINE HYDROCHLORIDE 1 DROP: 5 SOLUTION OPHTHALMIC at 12:40

## 2025-04-13 RX ADMIN — FLUORESCEIN SODIUM 1 STRIP: 1 STRIP OPHTHALMIC at 12:54

## 2025-04-13 NOTE — TELEPHONE ENCOUNTER
"Calling about her right eye  Asking to see an ophthalmologist  A couple of days ago  right eye very itchy  Has been putting  hydrocortisone creme around her eye for the itching  Now eye is worse  Skin is very red and itchy  Eyeball is really red and itchy Eye was sensitive to the sun yesterday  Denies pain but is uncomfortable.  Triage completed Care advice given    Reason for Disposition  • [1] Eye pain/discomfort AND [2] more than mild    Additional Information  • Negative: Followed an eye injury  • Negative: Eye pain from chemical in the eye  • Negative: Eye pain from foreign body in eye  • Negative: [1] Tender, red lump or pimple AND [2] located along the eyelid margin  • Negative: Has sinus pain or pressure  • Negative: Severe eye pain  • Negative: Complete loss of vision in one or both eyes  • Negative: [1] Eyelids are very swollen (shut or almost) AND [2] fever  • Negative: [1] Eyelid (outer) is very red AND [2] fever  • Negative: [1] Foreign body sensation (\"feels like something is in there\") AND [2] irrigation didn't help  • Negative: Vomiting  • Negative: Ulcer or sore seen on the cornea (clear center part of the eye)  • Negative: [1] Recent eye surgery AND [2] increasing eye pain  • Negative: [1] Blurred vision AND [2] new or worsening  • Negative: Patient sounds very sick or weak to the triager    Answer Assessment - Initial Assessment Questions  1. ONSET: \"When did the pain start?\" (e.g., minutes, hours, days)    A couple of days ago  2. TIMING: \"Does the pain come and go, or has it been constant since it started?\" (e.g., constant, intermittent, fleeting)     Eye is itchy and incomfortable    3. SEVERITY: \"How bad is the pain?\"   (Scale 1-10; mild, moderate or severe)    - MILD (1-3): doesn't interfere with normal activities     - MODERATE (4-7): interferes with normal activities or awakens from sleep     - SEVERE (8-10): excruciating pain and patient unable to do normal activities  Can't explain it " "feels pressure it feels different  4. LOCATION: \"Where does it hurt?\"  (e.g., eyelid, eye, cheekbone)      Right eyeball  skin around eye is red and itchy flaky  5. CAUSE: \"What do you think is causing the pain?\"      Applied Cortisone creme around eye for itchy   6. VISION: \"Do you have blurred vision or changes in your vision?\"       Can't explain it It is different  7. EYE DISCHARGE: \"Is there any discharge (pus) from the eye(s)?\"  If yes, ask: \"What color is it?\"      denies  8. FEVER: \"Do you have a fever?\" If Yes, ask: \"What is it, how was it measured, and when did it start?\"    denies  9. OTHER SYMPTOMS: \"Do you have any other symptoms?\" (e.g., headache, nasal discharge, facial rash)      *No Answer*  10. PREGNANCY: \"Is there any chance you are pregnant?\" \"When was your last menstrual period?\"    na    Protocols used: Eye Pain-ADULT-AH    "

## 2025-04-13 NOTE — FSED PROVIDER NOTE
Subjective   History of Present Illness  Patient is an 80-year-old female who has had some redness around her eyes like dry skin.  Says Bill irritated so yesterday she put some hydrocortisone cream on her upper eyelids and under her eyes.  She says that she started having some burning and itching.  She has put some baby oil on them and some cold water to wash them out and says they seem to help.  However the left eye especially is still irritated with itching and discomfort.  She is not having any change in her vision.  She was reading and found out that putting steroids in your eyes can cause glaucoma.  She has a history of cataract surgery but does not wear contacts.      Review of Systems   Constitutional: Negative.    HENT: Negative.     Eyes:  Positive for pain and itching. Negative for photophobia, discharge and visual disturbance.   Respiratory: Negative.     Cardiovascular: Negative.    Gastrointestinal: Negative.    Endocrine: Negative.    Genitourinary: Negative.    Musculoskeletal: Negative.    Skin: Negative.    Neurological: Negative.    Psychiatric/Behavioral: Negative.     All other systems reviewed and are negative.      Past Medical History:   Diagnosis Date    Altered bowel elimination due to intestinal ostomy     MARTINEZ CONTINENT INTESTINAL RESERVIOR-INTUBED W/CATH RLQ (LARGE INTESTINE REMOVED)    Anemia     Asthma     NO CURRENT INHALERS    Cataracts, bilateral     Extremity pain     GERD (gastroesophageal reflux disease)     Herpes simplex     History of DVT (deep vein thrombosis)     SUPERFICIAL RLE; DID NOT REQUIRE ANTICOAGULATION.  SAW DR. VLADIMIR MELGOZA    History of MRSA infection 1980    History of transfusion     History of UTI 04/06/2022    recurrent, ESBL    Hypertension     Low back pain     Lumbosacral disc disease     Migraine     Osteopenia     Prediabetes     Spinal stenosis, lumbar     Ulcerative colitis     COLECTOMY LARGE INTESTINE       Allergies   Allergen Reactions    Codeine  "Nausea And Vomiting     Sick to stomach    Other      PT SAID SHE CAN'T TAKE ANY TIME RELEASE MEDICINE OR ANYTHING WITH A \"COATING\" ON IT, PT DOES NOT HAVE LARGE INTESTINES SO MEDICATION WILL NOT DISSOLVE    Latex Rash    Nickel Rash    Penicillins Rash       Past Surgical History:   Procedure Laterality Date    APPENDECTOMY      BUNIONECTOMY Bilateral     BUNIONECTOMY Right 02/19/2018    Procedure: RT BUNIONECTOMY WITH DOUBLE OSTEOTOMY 2ND INTERPHALANGEAL ARTHROPLASTY ;  Surgeon: Karel Thomas MD;  [place] [facility] [place] [facility] [place]  Service:     CATARACT EXTRACTION, BILATERAL      CHOLECYSTECTOMY      COLONOSCOPY      CONTINENT CECOSTOMY  1980    LARGE INTESTINE REMOVED, Mccann continent Ileostomy in 1980, pouch is internal, stoma on the outside    LUMBAR DECOMPRESSION  04/24/2024    RIGHT L3-L5 LUMBAR DECOMPRESSION    LUMBAR DISCECTOMY FUSION INSTRUMENTATION N/A 12/16/2016    Procedure: MICROLAMINECTOMY L3 4  L4 5 W/ METRIX;  Surgeon: Valdez Greene DO;  Location: [facility] OR;  Service:     ROTATOR CUFF REPAIR Right     VASCULAR SURGERY Right     REPAIRED VEIN RIGHT LOWER EXTREMITY REPAIRED VALVE GROIN AREA    VENOUS CLOSURE Right     VenaSeal Procedure    WISDOM TOOTH EXTRACTION      FOUR       Family History   Problem Relation Age of Onset    Thyroid disease Mother     Lung cancer Mother         cigarette use    Hyperlipidemia Mother     Dementia Mother     Alcohol abuse Father     Suicide Attempts Father     Diabetes Sister     Breast cancer Sister     Hypertension Brother     Diabetes Brother     Diabetes Brother     Heart disease Paternal Grandmother     Aortic aneurysm Other     Malig Hyperthermia Neg Hx        Social History     Socioeconomic History    Marital status:    Tobacco Use    Smoking status: Never     Passive exposure: Past    Smokeless tobacco: Never   Vaping Use    Vaping status: Never Used   Substance and Sexual Activity    Alcohol use: Yes     Alcohol/week: 2.0 standard drinks of alcohol    "  Types: 2 Glasses of wine per week    Drug use: Never    Sexual activity: Defer           Objective   Physical Exam  Vitals reviewed.   Constitutional:       Appearance: Normal appearance. She is normal weight.   Eyes:      General: No scleral icterus.        Right eye: No discharge.         Left eye: No discharge.      Extraocular Movements: Extraocular movements intact.      Pupils: Pupils are equal, round, and reactive to light.        Comments: Palpate discoloration of both upper eyelids and under her eyes, does not look like cellulitis.  Slight fluorescein uptake left eye lateral to the iris and does not involve the visual field.  No foreign body noted.   Cardiovascular:      Rate and Rhythm: Normal rate.   Pulmonary:      Effort: Pulmonary effort is normal.   Musculoskeletal:         General: Normal range of motion.   Skin:     General: Skin is warm and dry.      Findings: Erythema present.   Neurological:      General: No focal deficit present.      Mental Status: She is alert.   Psychiatric:         Mood and Affect: Mood normal.         Behavior: Behavior normal.         Procedures           ED Course                                           Medical Decision Making  Presentation patient has some slight pink discoloration of both her eyelids and just under her eyes.  Does not look cellulitic just looks more like an irritation of the skin.  She says they had been like that for few days and that is why she put hydrocortisone on the eyelids and under her eyes and think she got hydrocortisone cream in her eyes.  The left eye has been worse than the right.  I do see some fluorescein uptake lateral to the iris on the left eye.  Do not see foreign body.  Negative Michael sign.  She thinks she probably rubbed them too much and caused it.  I talked to poison control and they said other than her rinsing the eyes which she did yesterday no intervention will be needed at this time.  Keely from poison control said that  glaucoma would be something to be concerned about with long-term use of hydrocortisone but not from 1 accidental contact.  Patient is not having eye watering or blurry vision.  She has an eye doctor so she can call and get seen this week.  I did give her ofloxacin drops to use on the left eye because of the corneal abrasion.  Strict return precautions and she is medically cleared to follow-up with her eye doctor.    Problems Addressed:  Abrasion of left cornea, initial encounter: complicated acute illness or injury    Risk  Prescription drug management.        Final diagnoses:   Abrasion of left cornea, initial encounter       ED Disposition  ED Disposition       ED Disposition   Discharge    Condition   Stable    Comment   --                 Schedule an appointment with your eye doctor as we discussed this week.             Medication List        New Prescriptions      ofloxacin 0.3 % ophthalmic solution  Commonly known as: OCUFLOX  Administer 1 drop into the left eye 4 (Four) Times a Day for 5 days.               Where to Get Your Medications        These medications were sent to [id number] - [place], [place] - [address] - [phone]  - [phone]   [address] [place] [address]      Phone: [phone]   ofloxacin 0.3 % ophthalmic solution

## 2025-04-14 NOTE — TELEPHONE ENCOUNTER
Does she have an optometrist? I recommend she call her optometrist or one in her area and ask for a MEDICAL EYE APPOINTMENT. They can evaluate her usually same or next day and then refer her to an ophthalmologist if needed.

## 2025-05-05 DIAGNOSIS — I10 ESSENTIAL HYPERTENSION: ICD-10-CM

## 2025-05-05 RX ORDER — AMLODIPINE BESYLATE 2.5 MG/1
2.5 TABLET ORAL DAILY
Qty: 90 TABLET | Refills: 1 | Status: SHIPPED | OUTPATIENT
Start: 2025-05-05

## 2025-08-01 DIAGNOSIS — I10 ESSENTIAL HYPERTENSION: ICD-10-CM

## 2025-08-01 RX ORDER — AMLODIPINE BESYLATE 2.5 MG/1
2.5 TABLET ORAL DAILY
Qty: 90 TABLET | Refills: 1 | Status: SHIPPED | OUTPATIENT
Start: 2025-08-01

## 2025-08-03 ENCOUNTER — HOSPITAL ENCOUNTER (OUTPATIENT)
Facility: HOSPITAL | Age: 80
Discharge: HOME | End: 2025-08-03
Attending: EMERGENCY MEDICINE | Admitting: EMERGENCY MEDICINE
Payer: MEDICARE

## 2025-08-03 VITALS
OXYGEN SATURATION: 95 % | DIASTOLIC BLOOD PRESSURE: 87 MMHG | WEIGHT: 136 LBS | TEMPERATURE: 97.9 F | RESPIRATION RATE: 18 BRPM | BODY MASS INDEX: 26.7 KG/M2 | HEART RATE: 78 BPM | HEIGHT: 60 IN | SYSTOLIC BLOOD PRESSURE: 141 MMHG

## 2025-08-03 DIAGNOSIS — L25.9 CONTACT DERMATITIS, UNSPECIFIED CONTACT DERMATITIS TYPE, UNSPECIFIED TRIGGER: Primary | ICD-10-CM

## 2025-08-03 PROCEDURE — G0463 HOSPITAL OUTPT CLINIC VISIT: HCPCS | Performed by: NURSE PRACTITIONER

## 2025-08-03 RX ORDER — METHYLPREDNISOLONE 4 MG/1
TABLET ORAL
Qty: 21 TABLET | Refills: 0 | Status: SHIPPED | OUTPATIENT
Start: 2025-08-03

## 2025-08-22 ENCOUNTER — OFFICE VISIT (OUTPATIENT)
Age: 80
End: 2025-08-22
Payer: MEDICARE

## 2025-08-22 VITALS
DIASTOLIC BLOOD PRESSURE: 80 MMHG | HEIGHT: 60 IN | BODY MASS INDEX: 27.64 KG/M2 | SYSTOLIC BLOOD PRESSURE: 124 MMHG | HEART RATE: 67 BPM | WEIGHT: 140.8 LBS

## 2025-08-22 DIAGNOSIS — R07.9 CHEST PAIN, UNSPECIFIED TYPE: Primary | ICD-10-CM

## 2025-08-22 DIAGNOSIS — I10 ESSENTIAL HYPERTENSION: ICD-10-CM

## 2025-08-22 PROCEDURE — 3074F SYST BP LT 130 MM HG: CPT | Performed by: NURSE PRACTITIONER

## 2025-08-22 PROCEDURE — 99214 OFFICE O/P EST MOD 30 MIN: CPT | Performed by: NURSE PRACTITIONER

## 2025-08-22 PROCEDURE — 1160F RVW MEDS BY RX/DR IN RCRD: CPT | Performed by: NURSE PRACTITIONER

## 2025-08-22 PROCEDURE — 1159F MED LIST DOCD IN RCRD: CPT | Performed by: NURSE PRACTITIONER

## 2025-08-22 PROCEDURE — 3079F DIAST BP 80-89 MM HG: CPT | Performed by: NURSE PRACTITIONER

## 2025-08-22 PROCEDURE — 93000 ELECTROCARDIOGRAM COMPLETE: CPT | Performed by: NURSE PRACTITIONER

## 2025-08-22 RX ORDER — PREDNISONE 20 MG/1
TABLET ORAL
COMMUNITY

## 2025-08-22 RX ORDER — OFLOXACIN 3 MG/ML
SOLUTION/ DROPS OPHTHALMIC
COMMUNITY

## 2025-08-22 RX ORDER — NITROFURANTOIN 25; 75 MG/1; MG/1
CAPSULE ORAL
COMMUNITY
End: 2025-08-22

## 2025-08-29 ENCOUNTER — TELEPHONE (OUTPATIENT)
Dept: CARDIOLOGY | Age: 80
End: 2025-08-29
Payer: MEDICARE

## (undated) DEVICE — TOWEL,OR,DSP,ST,BLUE,STD,4/PK,20PK/CS: Brand: MEDLINE

## (undated) DEVICE — STCKNT IMPERV 12IN STRL

## (undated) DEVICE — PRECISION THIN (9.0 X 0.38 X 25.0MM)

## (undated) DEVICE — INTENDED FOR TISSUE SEPARATION, AND OTHER PROCEDURES THAT REQUIRE A SHARP SURGICAL BLADE TO PUNCTURE OR CUT.: Brand: BARD-PARKER ® CARBON RIB-BACK BLADES

## (undated) DEVICE — PROXIMATE RH ROTATING HEAD SKIN STAPLERS (35 WIDE) CONTAINS 35 STAINLESS STEEL STAPLES: Brand: PROXIMATE

## (undated) DEVICE — GLV SURG SENSICARE LT W/ALOE PF LF 8.5 STRL

## (undated) DEVICE — GLV SURG SENSICARE GREEN W/ALOE PF LF 8.5 STRL

## (undated) DEVICE — SUT VIC 4/0 PS1 27IN J935H

## (undated) DEVICE — NDL HYPO MAGELLAN SFTY 18G 1.5IN

## (undated) DEVICE — PREMIUM WET SKIN PREP TRAY: Brand: MEDLINE INDUSTRIES, INC.

## (undated) DEVICE — Device

## (undated) DEVICE — SUT PROLN 4/0 FS2 18IN 8683G

## (undated) DEVICE — SUT VIC 2/0 X1 27IN J459H

## (undated) DEVICE — SYR LL TP 10ML STRL

## (undated) DEVICE — IMPLANTABLE DEVICE
Type: IMPLANTABLE DEVICE | Site: FOOT | Status: NON-FUNCTIONAL
Removed: 2018-02-19

## (undated) DEVICE — PK ORTHO MINOR TOWER 40

## (undated) DEVICE — PAD,ABDOMINAL,8"X10",ST,LF: Brand: MEDLINE

## (undated) DEVICE — NDL HYPO PRECISIONGLIDE REG 25G 1 1/2

## (undated) DEVICE — DISPOSABLE TOURNIQUET CUFF SINGLE BLADDER, SINGLE PORT AND QUICK CONNECT CONNECTOR: Brand: COLOR CUFF

## (undated) DEVICE — BIT DRL COMPRESSION/FT MICRO